# Patient Record
Sex: FEMALE | Race: WHITE | NOT HISPANIC OR LATINO | Employment: FULL TIME | URBAN - METROPOLITAN AREA
[De-identification: names, ages, dates, MRNs, and addresses within clinical notes are randomized per-mention and may not be internally consistent; named-entity substitution may affect disease eponyms.]

---

## 2017-01-01 LAB — PAP-ABSTRACT: NORMAL

## 2017-01-17 ENCOUNTER — TRANSFERRED RECORDS (OUTPATIENT)
Dept: HEALTH INFORMATION MANAGEMENT | Facility: CLINIC | Age: 27
End: 2017-01-17

## 2017-02-06 ENCOUNTER — HOSPITAL ENCOUNTER (INPATIENT)
Facility: CLINIC | Age: 27
LOS: 2 days | Discharge: HOME | DRG: 445 | End: 2017-02-08
Attending: EMERGENCY MEDICINE | Admitting: INTERNAL MEDICINE
Payer: COMMERCIAL

## 2017-02-06 DIAGNOSIS — K75.9 HEPATITIS: ICD-10-CM

## 2017-02-06 DIAGNOSIS — F41.9 ANXIETY: Primary | ICD-10-CM

## 2017-02-06 DIAGNOSIS — R94.5 ABNORMAL RESULTS OF LIVER FUNCTION STUDIES: ICD-10-CM

## 2017-02-06 DIAGNOSIS — R11.10 VOMITING: ICD-10-CM

## 2017-02-06 LAB
CRP SERPL-MCNC: 13.7 MG/L (ref 0–8)
GGT SERPL-CCNC: 598 U/L (ref 0–40)
HCG SERPL QL: NEGATIVE
HETEROPH AB SER QL: NEGATIVE
INR PPP: 0.91 (ref 0.86–1.14)

## 2017-02-06 PROCEDURE — 96375 TX/PRO/DX INJ NEW DRUG ADDON: CPT

## 2017-02-06 PROCEDURE — 84703 CHORIONIC GONADOTROPIN ASSAY: CPT | Performed by: EMERGENCY MEDICINE

## 2017-02-06 PROCEDURE — 36415 COLL VENOUS BLD VENIPUNCTURE: CPT | Performed by: INTERNAL MEDICINE

## 2017-02-06 PROCEDURE — 82977 ASSAY OF GGT: CPT | Performed by: EMERGENCY MEDICINE

## 2017-02-06 PROCEDURE — 99222 1ST HOSP IP/OBS MODERATE 55: CPT | Mod: AI | Performed by: INTERNAL MEDICINE

## 2017-02-06 PROCEDURE — 25000128 H RX IP 250 OP 636: Performed by: EMERGENCY MEDICINE

## 2017-02-06 PROCEDURE — 25000128 H RX IP 250 OP 636: Performed by: INTERNAL MEDICINE

## 2017-02-06 PROCEDURE — 12000011 ZZH R&B MS OVERFLOW

## 2017-02-06 PROCEDURE — 99285 EMERGENCY DEPT VISIT HI MDM: CPT | Mod: 25

## 2017-02-06 PROCEDURE — 12000013 ZZH R&B PEDS

## 2017-02-06 PROCEDURE — 85610 PROTHROMBIN TIME: CPT | Performed by: EMERGENCY MEDICINE

## 2017-02-06 PROCEDURE — 86308 HETEROPHILE ANTIBODY SCREEN: CPT | Performed by: INTERNAL MEDICINE

## 2017-02-06 PROCEDURE — 96374 THER/PROPH/DIAG INJ IV PUSH: CPT

## 2017-02-06 PROCEDURE — 86140 C-REACTIVE PROTEIN: CPT | Performed by: EMERGENCY MEDICINE

## 2017-02-06 PROCEDURE — 25000125 ZZHC RX 250: Performed by: INTERNAL MEDICINE

## 2017-02-06 RX ORDER — ONDANSETRON 2 MG/ML
4 INJECTION INTRAMUSCULAR; INTRAVENOUS EVERY 6 HOURS PRN
Status: DISCONTINUED | OUTPATIENT
Start: 2017-02-06 | End: 2017-02-08 | Stop reason: HOSPADM

## 2017-02-06 RX ORDER — ONDANSETRON 4 MG/1
4 TABLET, ORALLY DISINTEGRATING ORAL EVERY 6 HOURS PRN
Status: DISCONTINUED | OUTPATIENT
Start: 2017-02-06 | End: 2017-02-08 | Stop reason: HOSPADM

## 2017-02-06 RX ORDER — PROCHLORPERAZINE MALEATE 5 MG
5-10 TABLET ORAL EVERY 6 HOURS PRN
Status: DISCONTINUED | OUTPATIENT
Start: 2017-02-06 | End: 2017-02-08 | Stop reason: HOSPADM

## 2017-02-06 RX ORDER — SODIUM CHLORIDE 9 MG/ML
INJECTION, SOLUTION INTRAVENOUS CONTINUOUS
Status: DISCONTINUED | OUTPATIENT
Start: 2017-02-06 | End: 2017-02-08 | Stop reason: HOSPADM

## 2017-02-06 RX ORDER — DESOGESTREL AND ETHINYL ESTRADIOL 0.15-0.03
1 KIT ORAL DAILY
COMMUNITY
End: 2018-03-21

## 2017-02-06 RX ORDER — TEMAZEPAM 7.5 MG/1
15 CAPSULE ORAL
Status: DISCONTINUED | OUTPATIENT
Start: 2017-02-06 | End: 2017-02-08 | Stop reason: HOSPADM

## 2017-02-06 RX ORDER — KETOROLAC TROMETHAMINE 30 MG/ML
30 INJECTION, SOLUTION INTRAMUSCULAR; INTRAVENOUS ONCE
Status: COMPLETED | OUTPATIENT
Start: 2017-02-06 | End: 2017-02-06

## 2017-02-06 RX ORDER — DIPHENHYDRAMINE HYDROCHLORIDE 50 MG/ML
25 INJECTION INTRAMUSCULAR; INTRAVENOUS ONCE
Status: COMPLETED | OUTPATIENT
Start: 2017-02-06 | End: 2017-02-06

## 2017-02-06 RX ORDER — LEVOFLOXACIN 5 MG/ML
500 INJECTION, SOLUTION INTRAVENOUS EVERY 24 HOURS
Status: DISCONTINUED | OUTPATIENT
Start: 2017-02-06 | End: 2017-02-07

## 2017-02-06 RX ORDER — ONDANSETRON 2 MG/ML
4 INJECTION INTRAMUSCULAR; INTRAVENOUS EVERY 30 MIN PRN
Status: DISCONTINUED | OUTPATIENT
Start: 2017-02-06 | End: 2017-02-06

## 2017-02-06 RX ORDER — OXYCODONE HYDROCHLORIDE 5 MG/1
5-10 TABLET ORAL
Status: DISCONTINUED | OUTPATIENT
Start: 2017-02-06 | End: 2017-02-08 | Stop reason: HOSPADM

## 2017-02-06 RX ORDER — VENLAFAXINE HYDROCHLORIDE 75 MG/1
150 TABLET, EXTENDED RELEASE ORAL ONCE
Status: DISCONTINUED | OUTPATIENT
Start: 2017-02-06 | End: 2017-02-06

## 2017-02-06 RX ORDER — VENLAFAXINE HYDROCHLORIDE 150 MG/1
150 TABLET, EXTENDED RELEASE ORAL DAILY
Status: DISCONTINUED | OUTPATIENT
Start: 2017-02-07 | End: 2017-02-08 | Stop reason: HOSPADM

## 2017-02-06 RX ORDER — ONDANSETRON 4 MG/1
4 TABLET, ORALLY DISINTEGRATING ORAL ONCE
Status: DISCONTINUED | OUTPATIENT
Start: 2017-02-06 | End: 2017-02-06

## 2017-02-06 RX ORDER — PROCHLORPERAZINE 25 MG
25 SUPPOSITORY, RECTAL RECTAL EVERY 12 HOURS PRN
Status: DISCONTINUED | OUTPATIENT
Start: 2017-02-06 | End: 2017-02-08 | Stop reason: HOSPADM

## 2017-02-06 RX ORDER — HYDROMORPHONE HYDROCHLORIDE 1 MG/ML
.3-.5 INJECTION, SOLUTION INTRAMUSCULAR; INTRAVENOUS; SUBCUTANEOUS
Status: DISCONTINUED | OUTPATIENT
Start: 2017-02-06 | End: 2017-02-08 | Stop reason: HOSPADM

## 2017-02-06 RX ORDER — NALOXONE HYDROCHLORIDE 0.4 MG/ML
.1-.4 INJECTION, SOLUTION INTRAMUSCULAR; INTRAVENOUS; SUBCUTANEOUS
Status: DISCONTINUED | OUTPATIENT
Start: 2017-02-06 | End: 2017-02-08 | Stop reason: HOSPADM

## 2017-02-06 RX ORDER — LORAZEPAM 2 MG/ML
0.5 INJECTION INTRAMUSCULAR EVERY 4 HOURS PRN
Status: DISCONTINUED | OUTPATIENT
Start: 2017-02-06 | End: 2017-02-08 | Stop reason: HOSPADM

## 2017-02-06 RX ORDER — VENLAFAXINE HYDROCHLORIDE 150 MG/1
150 CAPSULE, EXTENDED RELEASE ORAL DAILY
Status: ON HOLD | COMMUNITY
End: 2017-02-08

## 2017-02-06 RX ORDER — DESOGESTREL AND ETHINYL ESTRADIOL 0.15-0.03
1 KIT ORAL DAILY
Status: DISCONTINUED | OUTPATIENT
Start: 2017-02-07 | End: 2017-02-08 | Stop reason: HOSPADM

## 2017-02-06 RX ADMIN — DIPHENHYDRAMINE HYDROCHLORIDE 25 MG: 50 INJECTION INTRAMUSCULAR; INTRAVENOUS at 19:48

## 2017-02-06 RX ADMIN — KETOROLAC TROMETHAMINE 30 MG: 30 INJECTION, SOLUTION INTRAMUSCULAR at 18:20

## 2017-02-06 RX ADMIN — METRONIDAZOLE 500 MG: 500 INJECTION, SOLUTION INTRAVENOUS at 22:17

## 2017-02-06 RX ADMIN — LEVOFLOXACIN 500 MG: 5 INJECTION, SOLUTION INTRAVENOUS at 21:14

## 2017-02-06 RX ADMIN — ONDANSETRON 4 MG: 2 INJECTION INTRAMUSCULAR; INTRAVENOUS at 18:20

## 2017-02-06 RX ADMIN — SODIUM CHLORIDE: 9 INJECTION, SOLUTION INTRAVENOUS at 21:08

## 2017-02-06 ASSESSMENT — ACTIVITIES OF DAILY LIVING (ADL)
SWALLOWING: 0-->SWALLOWS FOODS/LIQUIDS WITHOUT DIFFICULTY
TRANSFERRING: 0-->INDEPENDENT
DRESS: 0-->INDEPENDENT
RETIRED_EATING: 0-->INDEPENDENT
BATHING: 0-->INDEPENDENT
TOILETING: 0-->INDEPENDENT
COGNITION: 0 - NO COGNITION ISSUES REPORTED
FALL_HISTORY_WITHIN_LAST_SIX_MONTHS: NO
AMBULATION: 0-->INDEPENDENT
RETIRED_COMMUNICATION: 0-->UNDERSTANDS/COMMUNICATES WITHOUT DIFFICULTY

## 2017-02-06 ASSESSMENT — ENCOUNTER SYMPTOMS
VOMITING: 1
NAUSEA: 1
ABDOMINAL PAIN: 1

## 2017-02-06 NOTE — IP AVS SNAPSHOT
"                  MRN:[id number]                      After Visit Summary   2/6/2017    Alba Lee    MRN: [id number]           Thank you!     Thank you for choosing [facility] for your care. Our goal is always to provide you with excellent care. Hearing back from our patients is one way we can continue to improve our services. Please take a few minutes to complete the written survey that you may receive in the mail after you visit. If you would like to speak to someone directly about your visit please contact Patient Relations at [phone]. Thank you!          Patient Information     Date Of Birth          1990        About your hospital stay     You were admitted on:  February 6, 2017 You last received care in the:  [facility]    You were discharged on:  February 8, 2017        Reason for your hospital stay       Pain in abdomen  Abnormal Liver function Tests                  Who to Call     For medical emergencies, please call 911.  For non-urgent questions about your medical care, please call your primary care provider or clinic, [phone]          Attending Provider     Provider    Anderson Duque MD Ricklefs, Kendall D, DO       Primary Care Provider Office Phone # Fax #    Jozef Zelaya PA-C [phone]       [facility] [address] [place] [address]        After Care Instructions     Diet       Follow this diet upon discharge: Low fat diet                  Follow-up Appointments     Follow-up and recommended labs and tests        Follow up with primary care provider, Jozef Zelaya, within 7 days for hospital follow- up.  The following labs/tests are recommended: Liver function tests. If your symptoms persist, then talk with your primary doctor about doing a \"HIDA\" scan for the gallbladder.     Discuss with your primary doctor about reducing the dose for Effexor     Follow up with GI specialist in 1-2 weeks.      No Alcohol " "use, avoid use of tylenol (acetaminophen)                  Additional Services     GASTROENTEROLOGY ADULT REF CONSULT ONLY       Preferred Location: [facility] [phone]. Seen by Dr. Yeboah in hospital       Please be aware that coverage of these services is subject to the terms and limitations of your health insurance plan.  Call member services at your health plan with any benefit or coverage questions.  Any procedures must be performed at a [facility] facility OR coordinated by your clinic's referral office.    Please bring the following with you to your appointment:    (1) Any X-Rays, CTs or MRIs which have been performed.  Contact the facility where they were done to arrange for  prior to your scheduled appointment.    (2) List of current medications   (3) This referral request   (4) Any documents/labs given to you for this referral                  Pending Results     Date and Time Order Name Status Description    2/8/2017 0525 Hepatitis C antibody In process     2/7/2017 1910 Hepatitis C antibody In process     2/7/2017 1627 F Actin EAI with reflex In process     2/7/2017 1612 Antinuclear antibody screen by EIA In process             Statement of Approval     Ordered          02/08/17 1206  I have reviewed and agree with all the recommendations and orders detailed in this document.   EFFECTIVE NOW     Approved and electronically signed by:  Cristiana Villar MD             Admission Information        Provider Department Dept Phone    2/6/2017 Zeyad Schmitt,   Pediatrics [phone]      Your Vitals Were     Blood Pressure Pulse Temperature Respirations    130/80 mmHg 93 98  F (36.7  C) (Oral) 16    Height Weight BMI (Body Mass Index) Pulse Oximetry    1.575 m (5' 2\") 82.6 kg (182 lb 1.6 oz) 33.30 kg/m2 98%    Last Period             01/30/2017         MyChart Information     [employer] lets you send messages to your doctor, view your test results, renew your prescriptions, schedule " "appointments and more. To sign up, go to [email or web address] . Click on \"Log in\" on the left side of the screen, which will take you to the Welcome page. Then click on \"Sign up Now\" on the right side of the page.     You will be asked to enter the access code listed below, as well as some personal information. Please follow the directions to create your username and password.     Your access code is: [email or web address]  Expires: 2017 11:46 AM     Your access code will  in 90 days. If you need help or a new code, please call your [place] clinic or [phone].        Care EveryWhere ID     This is your Care EveryWhere ID. This could be used by other organizations to access your [place] medical records  [id number]           Review of your medicines      CONTINUE these medicines which may have CHANGED, or have new prescriptions. If we are uncertain of the size of tablets/capsules you have at home, strength may be listed as something that might have changed.        Dose / Directions    venlafaxine 75 MG 24 hr capsule   Commonly known as:  EFFEXOR-XR   This may have changed:    - medication strength  - how much to take   Used for:  Anxiety        Dose:  75 mg   Take 1 capsule (75 mg) by mouth daily   Quantity:  10 capsule   Refills:  0         CONTINUE these medicines which have NOT CHANGED        Dose / Directions    ondansetron 4 MG tablet   Commonly known as:  ZOFRAN        Take by mouth every 6 hours as needed for nausea   Refills:  0       RECLIPSEN 0.15-30 MG-MCG per tablet   Generic drug:  desogestrel-ethinyl estradiol        Dose:  1 tablet   Take 1 tablet by mouth daily   Refills:  0            Where to get your medicines      These medications were sent to [id number] - [place] - [address]  AT [address] [place] [id number] [place] [address]     Phone:  [phone]    - venlafaxine 75 MG 24 hr capsule             Protect others around you: Learn how " to safely use, store and throw away your medicines at www.disposemymeds.org.             Medication List: This is a list of all your medications and when to take them. Check marks below indicate your daily home schedule. Keep this list as a reference.      Medications           Morning Afternoon Evening Bedtime As Needed    ondansetron 4 MG tablet   Commonly known as:  ZOFRAN   Take by mouth every 6 hours as needed for nausea                                RECLIPSEN 0.15-30 MG-MCG per tablet   Take 1 tablet by mouth daily   Last time this was given:  1 tablet on 2/8/2017  7:56 AM   Generic drug:  desogestrel-ethinyl estradiol                                venlafaxine 75 MG 24 hr capsule   Commonly known as:  EFFEXOR-XR   Take 1 capsule (75 mg) by mouth daily

## 2017-02-06 NOTE — ED NOTES
Pt has abdominal pain with nausea and vomiting for past 5 days.  Sxs have worsened in past 3 days.  She is referred from clinic after abnormal liver tests were found and results are worse today than yesterday.  CT scan was done today.  Ultrasound done yesterday.

## 2017-02-06 NOTE — ED PROVIDER NOTES
History     Chief Complaint:  Abdominal Pain and Nausea & Vomiting    HPI   Alba Lee is a 26 year old female who presents with abdominal pain. The patient has had abdominal pain, nausea and vomiting for the past 5 days, worsening over the past 3 days. Yesterday she went to [facility] urgent care, and had blood drawn and urinalysis. She was then sent to [facility] for ultrasound imaging. Today, the patient went back to have blood drawn at urgent care, and her LFT's were increased. She also underwent CT imaging. In addition, the patient has had dark urine, despite drinking plenty of fluids. They told her to present to the ED. The patient has no history of liver problems. No prior blood transfusions. She did get a tattoo several years ago.    Prior Imaging  2017  Abdomen US (RUQ organs), per radiology:   Unremarkable right upper quadrant ultrasound.    2017  CT Abdomen and Pelvis, w IV contrast, per radiology:   1. Mildly dilated gallbladder with slightly prominent wall. No calculi were seen on the previous ultrasound which should make cholecystitis less likely.   2. No other acute pathology.     Prior lab testin2017  CMP: ALKPHOS 231 (H),  (H), AST 96 (H), Bilirubin, direct 3.6 (H), Bilirubin total 4.4 (H), o/w WNL (Creatinine 0.70)    2017  CMP: ALKPHOS 260 (H),  (H),  (H), Bilirubin direct 3.8 (H), Bilirubin total 4.6 (H), o/w WNL     Allergies:  Penicillins    Medications:    Venlafaxine HCl  Estrostep  Celexa    Past Medical History:    Depression  Suicide attempt    Past Surgical History:    History reviewed.  No significant past surgical history.    Family History:    History reviewed.  No significant family history.    Social History:  Relationship status: Single  Tobacco use: Positive  Alcohol use: Positive  The patient presents with her mother.     Review of Systems   Gastrointestinal: Positive for nausea, vomiting and abdominal pain.   All other  systems reviewed and are negative.      Physical Exam   First Vitals:  BP: (!) 131/93 mmHg  Heart Rate: 112  Temp: 98.2  F (36.8  C)  Resp: 20  SpO2: 97 %      Physical Exam   Constitutional: She is oriented to person, place, and time. She appears well-developed.   HENT:   Head: Normocephalic and atraumatic.   Right Ear: External ear normal.   Mouth/Throat: Oropharynx is clear and moist.   Eyes: Conjunctivae and EOM are normal. Pupils are equal, round, and reactive to light.   Neck: Normal range of motion. Neck supple. No JVD present.   Cardiovascular: Normal rate, regular rhythm and normal heart sounds.    Pulmonary/Chest: Effort normal and breath sounds normal.   Abdominal: Soft. Bowel sounds are normal. She exhibits no distension. There is tenderness in the right upper quadrant. There is no rigidity, no rebound and no CVA tenderness. No hernia.   Musculoskeletal: Normal range of motion.   Lymphadenopathy:     She has no cervical adenopathy.   Neurological: She is alert and oriented to person, place, and time. She displays normal reflexes. No cranial nerve deficit. She exhibits normal muscle tone. Coordination normal.   Skin: Skin is warm and dry.   Psychiatric: She has a normal mood and affect. Her behavior is normal. Judgment normal.   Nursing note and vitals reviewed.        Emergency Department Course     Laboratory:  1640: INR: 0.91  CRP inflammation: 13.7 (H)  HCG Qualitative blood: Negative  GGT: 598 (H)    Interventions:  1820: Toradol, 30 mg, IV injection  1948: Benadryl, 25 mg, IV injection    Emergency Department Course:  Nursing notes and vitals reviewed.  I performed an exam of the patient as documented above.  The above workup was undertaken.  1908: I discussed the patient with Dr. Schmitt of the hospitalist service.  1933: I rechecked the patient and discussed results.    Findings and plan explained to the Patient who consents to admission. Discussed the patient with Dr. Schmitt, who will admit  the patient to a Med bed for further monitoring, evaluation, and treatment.      Impression & Plan      Medical Decision Making:  Alba Lee is a 26 year old female with RUQ abdominal pain and nausea. Patient has had a an extensive outpatient evaluation prior to seeing me, which includes CT and ultrasound. There is borderline enlargement of the gallbladder without clear stones, and without CBD debilitation. Patient has transaminates, as well as elevation in her total bilirubin. Differential diagnosis includes primary bilirubin disease vs obstruction. Due to pain and vomiting, I would recommend admission for further workup. Consider hida scan or MRCP. Care was discussed with Dr. Schmitt. Will admit to hospitalist. Managing nausea and continuing workup for acute elevations of liver function tests, and ruling out bile duct obstruction.    Plan: Admit to Dr. Schmitt    Diagnosis:  1. Vomiting  2. RUQ pain  3. Elevated liver function tests.     Diagnosis:    ICD-10-CM    1. Vomiting R11.10 Mononucleosis screen     Disposition:  Admit to a Med bed under the care of Dr. Schmitt.    I, John Cobian, am serving as a scribe on 2/6/2017 at 6:00 PM to personally document services performed by Anderson Duque MD, based on my observations and the provider's statements to me.    [facility] EMERGENCY DEPARTMENT        Anderson Duque MD  02/11/17 210

## 2017-02-06 NOTE — IP AVS SNAPSHOT
[facility]    [address]    [facility] [address]    Phone:  [phone]    Fax:  [phone]                                       After Visit Summary   2/6/2017    Alba Lee    MRN: [id number]           After Visit Summary Signature Page     I have received my discharge instructions, and my questions have been answered. I have discussed any challenges I see with this plan with the nurse or doctor.    ..........................................................................................................................................  Patient/Patient Representative Signature      ..........................................................................................................................................  Patient Representative Print Name and Relationship to Patient    ..................................................               ................................................  Date                                            Time    ..........................................................................................................................................  Reviewed by Signature/Title    ...................................................              ..............................................  Date                                                            Time

## 2017-02-07 ENCOUNTER — APPOINTMENT (OUTPATIENT)
Dept: MRI IMAGING | Facility: CLINIC | Age: 27
DRG: 445 | End: 2017-02-07
Attending: PHYSICIAN ASSISTANT
Payer: COMMERCIAL

## 2017-02-07 LAB
ALBUMIN SERPL-MCNC: 3.2 G/DL (ref 3.4–5)
ALP SERPL-CCNC: 221 U/L (ref 40–150)
ALT SERPL W P-5'-P-CCNC: 272 U/L (ref 0–50)
ANION GAP SERPL CALCULATED.3IONS-SCNC: 10 MMOL/L (ref 3–14)
AST SERPL W P-5'-P-CCNC: 153 U/L (ref 0–45)
BILIRUB SERPL-MCNC: 3.4 MG/DL (ref 0.2–1.3)
BUN SERPL-MCNC: 7 MG/DL (ref 7–30)
CALCIUM SERPL-MCNC: 8.2 MG/DL (ref 8.5–10.1)
CHLORIDE SERPL-SCNC: 108 MMOL/L (ref 94–109)
CO2 SERPL-SCNC: 24 MMOL/L (ref 20–32)
CREAT SERPL-MCNC: 0.66 MG/DL (ref 0.52–1.04)
ERYTHROCYTE [DISTWIDTH] IN BLOOD BY AUTOMATED COUNT: 13.4 % (ref 10–15)
GFR SERPL CREATININE-BSD FRML MDRD: ABNORMAL ML/MIN/1.7M2
GLUCOSE SERPL-MCNC: 89 MG/DL (ref 70–99)
HCT VFR BLD AUTO: 35.3 % (ref 35–47)
HGB BLD-MCNC: 11.4 G/DL (ref 11.7–15.7)
MCH RBC QN AUTO: 28.6 PG (ref 26.5–33)
MCHC RBC AUTO-ENTMCNC: 32.3 G/DL (ref 31.5–36.5)
MCV RBC AUTO: 89 FL (ref 78–100)
PLATELET # BLD AUTO: 212 10E9/L (ref 150–450)
POTASSIUM SERPL-SCNC: 3.8 MMOL/L (ref 3.4–5.3)
PROT SERPL-MCNC: 6.6 G/DL (ref 6.8–8.8)
RBC # BLD AUTO: 3.99 10E12/L (ref 3.8–5.2)
SODIUM SERPL-SCNC: 142 MMOL/L (ref 133–144)
WBC # BLD AUTO: 4 10E9/L (ref 4–11)

## 2017-02-07 PROCEDURE — 25000132 ZZH RX MED GY IP 250 OP 250 PS 637: Performed by: INTERNAL MEDICINE

## 2017-02-07 PROCEDURE — A9585 GADOBUTROL INJECTION: HCPCS | Performed by: INTERNAL MEDICINE

## 2017-02-07 PROCEDURE — 12000011 ZZH R&B MS OVERFLOW

## 2017-02-07 PROCEDURE — 36415 COLL VENOUS BLD VENIPUNCTURE: CPT | Performed by: INTERNAL MEDICINE

## 2017-02-07 PROCEDURE — 83516 IMMUNOASSAY NONANTIBODY: CPT | Performed by: INTERNAL MEDICINE

## 2017-02-07 PROCEDURE — 99232 SBSQ HOSP IP/OBS MODERATE 35: CPT | Performed by: INTERNAL MEDICINE

## 2017-02-07 PROCEDURE — 86709 HEPATITIS A IGM ANTIBODY: CPT | Performed by: INTERNAL MEDICINE

## 2017-02-07 PROCEDURE — 25000125 ZZHC RX 250: Performed by: INTERNAL MEDICINE

## 2017-02-07 PROCEDURE — 87340 HEPATITIS B SURFACE AG IA: CPT | Performed by: INTERNAL MEDICINE

## 2017-02-07 PROCEDURE — 25000128 H RX IP 250 OP 636: Performed by: INTERNAL MEDICINE

## 2017-02-07 PROCEDURE — 80053 COMPREHEN METABOLIC PANEL: CPT | Performed by: INTERNAL MEDICINE

## 2017-02-07 PROCEDURE — 74183 MRI ABD W/O CNTR FLWD CNTR: CPT

## 2017-02-07 PROCEDURE — 86803 HEPATITIS C AB TEST: CPT | Performed by: INTERNAL MEDICINE

## 2017-02-07 PROCEDURE — 85027 COMPLETE CBC AUTOMATED: CPT | Performed by: INTERNAL MEDICINE

## 2017-02-07 PROCEDURE — 86038 ANTINUCLEAR ANTIBODIES: CPT | Performed by: INTERNAL MEDICINE

## 2017-02-07 PROCEDURE — 25500064 ZZH RX 255 OP 636: Performed by: INTERNAL MEDICINE

## 2017-02-07 PROCEDURE — 99221 1ST HOSP IP/OBS SF/LOW 40: CPT | Performed by: SURGERY

## 2017-02-07 RX ORDER — IBUPROFEN 200 MG
200 TABLET ORAL EVERY 6 HOURS PRN
Status: DISCONTINUED | OUTPATIENT
Start: 2017-02-07 | End: 2017-02-08 | Stop reason: HOSPADM

## 2017-02-07 RX ORDER — GADOBUTROL 604.72 MG/ML
15 INJECTION INTRAVENOUS ONCE
Status: COMPLETED | OUTPATIENT
Start: 2017-02-07 | End: 2017-02-07

## 2017-02-07 RX ORDER — DIPHENHYDRAMINE HCL 25 MG
25 CAPSULE ORAL EVERY 6 HOURS PRN
Status: DISCONTINUED | OUTPATIENT
Start: 2017-02-07 | End: 2017-02-08 | Stop reason: HOSPADM

## 2017-02-07 RX ORDER — DIPHENHYDRAMINE HYDROCHLORIDE 50 MG/ML
25 INJECTION INTRAMUSCULAR; INTRAVENOUS EVERY 6 HOURS PRN
Status: DISCONTINUED | OUTPATIENT
Start: 2017-02-07 | End: 2017-02-08 | Stop reason: HOSPADM

## 2017-02-07 RX ADMIN — DIPHENHYDRAMINE HYDROCHLORIDE 25 MG: 50 INJECTION, SOLUTION INTRAMUSCULAR; INTRAVENOUS at 10:51

## 2017-02-07 RX ADMIN — ONDANSETRON 4 MG: 2 INJECTION INTRAMUSCULAR; INTRAVENOUS at 06:08

## 2017-02-07 RX ADMIN — METRONIDAZOLE 500 MG: 500 INJECTION, SOLUTION INTRAVENOUS at 15:15

## 2017-02-07 RX ADMIN — GADOBUTROL 15 ML: 604.72 INJECTION INTRAVENOUS at 12:19

## 2017-02-07 RX ADMIN — TEMAZEPAM 15 MG: 7.5 CAPSULE ORAL at 22:16

## 2017-02-07 RX ADMIN — IBUPROFEN 200 MG: 200 TABLET, FILM COATED ORAL at 19:58

## 2017-02-07 RX ADMIN — SODIUM CHLORIDE: 9 INJECTION, SOLUTION INTRAVENOUS at 09:25

## 2017-02-07 RX ADMIN — DESOGESTREL AND ETHINYL ESTRADIOL 1 TABLET: KIT at 21:11

## 2017-02-07 RX ADMIN — METRONIDAZOLE 500 MG: 500 INJECTION, SOLUTION INTRAVENOUS at 06:08

## 2017-02-07 NOTE — PHARMACY-ADMISSION MEDICATION HISTORY
Admission medication history interview status for this patient is complete. See [facility] admission navigator for allergy information, prior to admission medications and immunization status.     Medication history interview source(s):Patient  Medication history resources (including written lists, pill bottles, clinic record):None    Changes made to PTA medication list:  Added: effexor  Deleted: celexa  Changed: from estrostep to Reclipsen    Actions taken by pharmacist (provider contacted, etc):None     Additional medication history information:None    Medication reconciliation/reorder completed by provider prior to medication history? No    For patients on insulin therapy: no (Yes/No)   Lantus/levemir/NPH/Mix 70/30 dose: _____ in AM/PM or twice daily   Sliding scale Novolog Y/N   If Yes, do you have a baseline novolog pre-meal dose: ______units with meals   Patients eat three meals a day: Y/N   Any Barriers to therapy: cost of medications/comfortable with giving injections (if applicable)/ comfortable and confident with current diabetes regimen       Prior to Admission medications    Medication Sig Last Dose Taking? Auth Provider   desogestrel-ethinyl estradiol (RECLIPSEN) 0.15-30 MG-MCG per tablet Take 1 tablet by mouth daily 2/5/2017 at Unknown time Yes Unknown, Entered By History   venlafaxine (EFFEXOR-XR) 150 MG 24 hr capsule Take 150 mg by mouth daily Past Week at Unknown time Yes Unknown, Entered By History

## 2017-02-07 NOTE — CONSULTS
General Surgery Consultation    Alba Lee MRN# [id number]   Age: 26 year old YOB: 1990     Date of Admission:  2/6/2017    Reason for consult:            Possible CBD stone       Requesting physician:            Keshia                Assessment and Plan:   Assessment:   Elevated LFTs, source unclear, passed CBD stone a possibility, as is infectious, toxic cause  Pancreatic divisum  Mild splenic enlargement - possibly due to recent viral illness      Plan:   While passage of gallstone a possibility, would expect significant pain to precede bilirubinuria and no stones or GB/duct abnormality seen on MRCP  Discussed option of cholecystectomy and ordered teaching literature but would be hesitant to recommend surgery without definitive evidence for GB disease  Since Sx are suggestive of GB disease but imaging does not support this diagnosis, will check a CCK stimulated HIDA scan. Discussed with radiology - cannot be done until tomorrow.                   Chief Complaint:   Nausea, vomiting, dark urine     History is obtained from the patient and electronic health record         History of Present Illness:   This patient is a 26 year old  female without a significant past medical history who presents with   .5 days of dark urine, 3 days of intermittent vomiting, especially after eating. Also noted some RUQ pains . Has felt chills but has had no fever. Reports having had a nutritional supplement prior to symptoms. Seen with mom.           Past Medical History:    has a past medical history of Depressive disorder and Suicide attempt (H) ([id number]).          Past Surgical History:   History reviewed. No pertinent past surgical history.          Social History:     Social History   Substance Use Topics     Smoking status: Current Some Day Smoker     Smokeless tobacco: Never Used     Alcohol Use: Yes      Comment: social (binge drinks), blackouts; no sz or DTs when stops             Family  "History:   This patient has no significant family history          Allergies:     Allergies   Allergen Reactions     Penicillins Hives             Medications:     No current facility-administered medications on file prior to encounter.  No current outpatient prescriptions on file prior to encounter.    levofloxacin  500 mg Intravenous Q24H     metroNIDAZOLE  500 mg Intravenous Q8H     desogestrel-ethinyl estradiol  1 tablet Oral Daily     venlafaxine  150 mg Oral Daily            Review of Systems:   The Review of Systems is negative other than noted in the HPI  She does report a viral illness for a few weeks, several weeks ago but felt significantly ill for only a few days          Physical Exam:   Gen:  This is a well developed, well nourished female in no apparent distress.  Blood pressure 123/68, pulse 80, temperature 98  F (36.7  C), temperature source Oral, resp. rate 16, height 1.575 m (5' 2\"), weight 82.6 kg (182 lb 1.6 oz), last menstrual period 01/30/2017, SpO2 99 %.  HEENT - Normocephalic, atraumatic, mucous membranes nl.  min scleral icterus.  Neck - supple without masses  Lungs - clear to ascultation.    Heart - Regular rate & rhythm without murmur  Abdomen:   soft, non-distended, non-tender and no masses palpated   Extremities - warm without edema  Neurologic - nonfocal          Data:   WBC -   WBC   Date Value Ref Range Status   02/07/2017 4.0 4.0 - 11.0 10e9/L Final   ], HgB - HEMOGLOBIN   Date Value Ref Range Status   02/07/2017 11.4* 11.7 - 15.7 g/dL Final   ]   Liver Function Studies -   Recent Labs   Lab Test  02/07/17   0710   PROTTOTAL  6.6*   ALBUMIN  3.2*   BILITOTAL  3.4*   ALKPHOS  221*   AST  153*   ALT  272*       CT scan of the abdomen:   No masses, free air, abcesses or significant abnormalities  By report form outside, no images to review  CT scan interpreted by radiologist     Gallbladder ultrasound:   Normal gallbladder without inflammation, enlargement or stones  Common bile duct " mildly dilated (no measurement)  Outside report, no images to review     MRI of the abdomen:   Gallbladder: normal, no stones, nl intra and extra hepatic bile duct, no choledocholithiasis,   Pancreas: divisum noted, main pancreatic duct empties via minor papilla   Spleen: enlarged at 16 cm        Yogi Nance MD

## 2017-02-07 NOTE — PROGRESS NOTES
"[facility]  Hospitalist Progress Note  Patient Name: Alba Lee    MRN: [id number]  Provider: Jace Berumen MD  02/07/2017    Initial presenting complaint/issue to hospital (Diagnosis): nausea and vomiting         Assessment and Plan:      Summary of Stay: Alba Lee is a 26 year old female with history of depression and anxiety.  She presented to the ED on 2/6/2017 with nausea and vomiting.  She had been seen in urgent care on 2/5 and 2/6 and found to have elevated LFT's (BilT 3.8, alk phos 260, , and ). Ab US showed no gall stones but did show thickened wall. She came to the ED with worsening symptoms.  ED work up showed Jose T of 3.4, alk phos of 221, ALT of 221 and AST of 272).  She was admitted for further eval.  She improved clinically.  She was seen by GI and passed CBD stone was suspected.  MRCP was ordered and Surgery consult for consideration of lap cesar at some point was recommended. MRCP showed no CBD stone or other abnormality.     Problem List:   1.  Elevated LFT's, possibly due to passed CBD stone.  MRCP showed no CBD stone.  Clear liquid diet- NPO after midnight. Surgery consult regarding further eval- HIDA? Recommend cholecystectomy?    2.  Depression and anxiety.  Continue Effexor.  DVT Prophylaxis:  Ambulation  Code Status: Full Code  Discharge Dispo: Home  Estimated Disch Date / # of Days until Discharge:  1-2 days.        Interval History:      Patient is feeling better with no pain, nausea, or vomiting.                   Physical Exam:      Last Vital Signs:  /68 mmHg  Pulse 80  Temp(Src) 98  F (36.7  C) (Oral)  Resp 16  Ht 1.575 m (5' 2\")  Wt 82.6 kg (182 lb 1.6 oz)  BMI 33.30 kg/m2  SpO2 99%  LMP 01/30/2017    Intake/Output Summary (Last 24 hours) at 02/07/17 1603  Last data filed at 02/07/17 0600   Gross per 24 hour   Intake   1436 ml   Output      0 ml   Net   1436 ml       GENERAL:  Comfortable appearing. Cooperative.  PSYCH: " pleasant, oriented, No acute distress.  EYES: PERRLA, Normal conjunctiva.  HEART:  Regular rate and rhythm. No JVD. Pulses normal. No edema.  LUNGS:  Clear to auscultation, normal Respiratory effort.  ABDOMEN:  Soft, no hepatosplenomegaly, normal bowel sounds.  EXTREMETIES: No clubbing, cyanosis or ischemia  SKIN:  Dry to touch, No rash.           Medications:      All current medications were reviewed.         Data:      All new lab and imaging data was reviewed.   Labs:  No results for input(s): CULT in the last 168 hours.       NA      142   2/7/2017  NA      139   5/23/2007  NA      141   9/6/2006 CHLORIDE      108   2/7/2017  CHLORIDE      104   5/23/2007  CHLORIDE      104   9/6/2006 BUN        7   2/7/2017  BUN       11   5/23/2007  BUN       12   9/6/2006   POTASSIUM      3.8   2/7/2017  POTASSIUM      3.4   5/23/2007  POTASSIUM      3.5   9/6/2006 CO2       24   2/7/2017  CO2       19   5/23/2007  CO2       21   9/6/2006 CR     0.66   2/7/2017  CR     0.79   5/23/2007  CR     0.80   9/6/2006       Recent Labs  Lab 02/07/17  0710   WBC 4.0   HGB 11.4*   HCT 35.3   MCV 89          Recent Labs  Lab 02/07/17  0710 02/06/17  1640   *  --    *  --    GGT  --  598*   ALKPHOS 221*  --    BILITOTAL 3.4*  --       Imaging:   Recent Results (from the past 24 hour(s))   MR Abdomen MRCP w/o & w Contrast    Narrative    MR ABDOMEN MRCP WITHOUT AND WITH CONTRAST  2/7/2017  12:37 PM     HISTORY: Dilated common bile duct.      TECHNIQUE:  Multisequence, multiplanar imaging is performed through  the biliary system. A total of 15 mL Gadavist is injected  intravenously followed by dynamic axial T1 fat sat sequences.    FINDINGS:     Abdomen: The liver, pancreas, adrenal glands and kidneys are  unremarkable. No hydronephrosis. No evidence of fatty liver  infiltration or mass. No enlarged lymph nodes. Imaged portions of  bowel are nondistended. Spleen is enlarged measuring nearly 16 cm in  craniocaudal  dimension on series 5, image 16. No focal splenic mass.    MRCP sequences show no evidence of intra or extrahepatic bile duct  dilatation. The gallbladder is unremarkable. No evidence of gallstones  or sludge. No biliary stones are evident. Pancreatic duct is normal in  caliber but empties in the minor papilla consistent with pancreas  divisum anomaly. This is best seen on series 6, image 21.    Following contrast administration, the upper abdominal organs enhance  normally. No evidence of ascites.      Impression    IMPRESSION:  1. Splenomegaly without focal mass. No enlarged abdominal lymph nodes.  2. Liver is unremarkable. No evidence of fatty infiltration or mass.  3. No evidence of gallstones or bile duct dilatation. Pancreas divisum  anomaly is noted. No peripancreatic inflammation to suggest  pancreatitis.    JONATHAN FARMER MD

## 2017-02-07 NOTE — CONSULTS
Contacted [employer] d/t pt not having insurance listed.  will see pt today.     Tanisha ELLIS RN, CTS  [phone]

## 2017-02-07 NOTE — CONSULTS
GASTROENTEROLOGY CONSULTATION      Alba Lee  [address]   [place] [address]  26 year old female     Admission Date/Time: 2/6/2017  Primary Care Provider: Jozef Zelaya  Referring / Attending Physician:  Dr. Schmitt     We were asked to see the patient in consultation by Dr. Schmitt for evaluation of elevated liver tests.        HPI:  Alba Lee is a 26 year old female without significant medical history who presents to the emergency room with abdominal pain, nausea, and vomiting.    Patient reports her abdominal pain came on suddenly in the right upper quadrant.  She states it was present upon waking about one week ago.  Initially she thought it was an upset stomach because of a change in her venlafaxine dosage.  However her pain persisted.  She was throwing up intermittently with little appetite.  She noticed her urine was darker than usual.  She went to [facility] urgent care.  Her LFTs were found to be elevated.  Abdominal ultrasound was completed and found to be unremarkable.  A CT scan of her abdomen and pelvis done with IV contrast showed a mildly dilated gallbladder.  Yesterday her ALT was 260, , total bilirubin 4.6.    Currently patient denies any significant abdominal pain.  She has no nausea or vomiting.  No fever or chills.  She denies any previous gallbladder problems.  There is no family history of gallbladder or liver disease.     PAST MEDICAL HISTORY:  Patient Active Problem List    Diagnosis Date Noted     Hepatitis 02/06/2017     Priority: Medium          ROS: A comprehensive ten point review of systems was negative aside from those in mentioned in the HPI.       MEDICATIONS:   Prior to Admission medications    Medication Sig Start Date End Date Taking? Authorizing Provider   desogestrel-ethinyl estradiol (RECLIPSEN) 0.15-30 MG-MCG per tablet Take 1 tablet by mouth daily   Yes Unknown, Entered By History   venlafaxine (EFFEXOR-XR) 150 MG 24 hr  "capsule Take 150 mg by mouth daily   Yes Unknown, Entered By History        ALLERGIES:   Allergies   Allergen Reactions     Penicillins Hives        SOCIAL HISTORY:  Social History   Substance Use Topics     Smoking status: Current Some Day Smoker     Smokeless tobacco: Never Used     Alcohol Use: Yes      Comment: social (binge drinks), blackouts; no sz or DTs when stops        FAMILY HISTORY: No history of gallbladder or liver disease.       PHYSICAL EXAM:   General: Comfortable in no distress  /68 mmHg  Pulse 80  Temp(Src) 98  F (36.7  C) (Oral)  Resp 16  Ht 1.575 m (5' 2\")  Wt 82.6 kg (182 lb 1.6 oz)  BMI 33.30 kg/m2  SpO2 99%  LMP 01/30/2017     PHYSICAL EXAM:  SKIN: no suspicious lesions, rashes  HEAD: Normocephalic. Atraumatic.  NECK: Neck supple. No adenopathy.   EYES: No scleral icterus  RESPIRATORY: Good transmission. CTA bilaterally.   CARDIOVASCULAR: RRR, normal S1, S2,  No murmur appreciated  GASTROINTESTINAL: +BS, soft, non tender, non distended, no guarding/rebound, no hepatosplenomegaly  JOINT/EXTREMITIES:  no gross deformities noted, normal muscle tone  NEURO: CN 2-12 grossly intact, no focal deficits  PSYCH: Normal affect          ADDITIONAL COMMENTS:   I reviewed the patient's new clinical lab test results.   Recent Labs   Lab Test  02/07/17   0710  02/06/17   1640   WBC  4.0   --    HGB  11.4*   --    MCV  89   --    PLT  212   --    INR   --   0.91     Recent Labs   Lab Test  02/07/17   0710   POTASSIUM  3.8   CHLORIDE  108   CO2  24   BUN  7   ANIONGAP  10     Recent Labs   Lab Test  02/07/17   0710   ALBUMIN  3.2*   BILITOTAL  3.4*   ALT  272*   AST  153*        IMAGING / ENDOSCOPY     Awaiting reports from care everywhere....     CONSULTATION ASSESSMENT AND PLAN:    Alba Lee is a 26 year old female without significant medical history who presents to the emergency room with abdominal pain, nausea, and vomiting found to have elevated LFTs without evidence of biliary " obstruction on imaging, although gallbladder wall thickening was seen.    1.  Elevated LFTs: Transaminases have improved overnight.  Bilirubin has dropped from 4.6-3.4.  She is no longer having any abdominal pain and her exam is benign.  She is afebrile without leukocytosis.  Suspect she may have passed a gallstone.  Per radiology report of ultrasound and CT no obvious biliary obstruction.     - Plan for MRCP, surgical consult, supportive care with IV fluids and pain control. NPO.       I discussed the patient plan with Dr. Yeboah. Thank you for asking us to participate in the care of this patient.    Sudha Carrasco PA-C  [facility]    MRCP is normal.  No evidence for stone disease.  Less likely gallbladder problem.  Will check serologies for hep A and B and auto-immune hepatitis.

## 2017-02-07 NOTE — H&P
CHIEF COMPLAINT:  Nausea and vomiting.      HISTORY OF PRESENT ILLNESS:  Alba Lee is a 26-year-old female who began noticing very dark urine 5 days ago, began having nausea 3 days ago, also started vomiting at that time.  Has been having quite a bit of difficulty eating or drinking anything because of her symptoms of nausea, vomiting.  Has also been noticing episodes that will happen with pain in her right upper quadrant.  This pain usually happens when she tries to eat anything, usually resolves fairly quickly, does not seem to radiate anywhere else, has never had anything like this before.  Has had quite a few chills with this.  Has not noticed any obvious fevers.  Has not been around anyone else sick that she knows of.  She has been to the urgent care twice in the past few days because of symptoms, most recently earlier in the day today.  At that time, per report, she did have laboratory testing done that showed a mildly elevated ALT and AST levels in the 200 range.  Also, an elevated bilirubin in the 4 range.  She did have a CT scan of the abdomen which per report had no obvious abnormalities.  An ultrasound of the abdomen had mild dilatation of her common bile duct, but no obvious stones noted.  She does note that she did take a supplement a few days before the symptoms started.  The name of the supplement is Khatum.      ALLERGIES:  The patient is allergic to PENICILLIN.      PAST MEDICAL HISTORY:  Depression.      MEDICATIONS:  The patient is taking    1. Effexor extended release 150 mg a day.   2.  Reclipsen 1 tablet a day.      SOCIAL HISTORY:  The patient occasionally smokes, but not on a regular basis.  Also, drinks alcohol in a pattern that is consistent with binge drinking.  She will only drink 2 maybe 3 days a week, but does drink 1-2 bottles of wine when she does drink.  Has not been able to drink any during the time that she has been sick over the past several days.      FAMILY HISTORY:   Father with skin cancer.  No coronary artery disease in the family that the patient knows of.      REVIEW OF SYSTEMS:  Positive for dark urine, nausea, vomiting, abdominal pain and chills as noted above.  Otherwise, a 10-point review of systems is negative for acute problems.      PHYSICAL EXAMINATION:   VITAL SIGNS:  Today show a temperature of 98.4, heart rate 94, blood pressure 125/71, respiratory rate 16, oxygen saturation 96%.   GENERAL:  No acute distress, awake, alert and oriented x3, well-developed, well-nourished.   HEENT:  Head is normocephalic, atraumatic.  Eyes:  Pupils are equal, round, react to light.  Extraocular muscles intact.  Sclerae do have some positive icterus present.  Oropharynx has moist mucous membranes, no lesions.   NECK:  Supple with no masses.   HEART:  Regular, no murmurs.   LUNGS:  Clear to auscultation bilaterally.  The patient is using normal respiratory effort to breathe, no accessory muscle use.   ABDOMEN:  Has positive bowel sounds, soft, mildly tender to palpation in the right upper quadrant, nondistended.   SKIN:  Warm and dry to touch.  No rash over examined skin.   EXTREMITIES:  No pitting edema in the lower extremities.  No cyanosis.   NEUROLOGIC:  Cranial nerves II-XII are grossly intact.  The patient moves all 4 extremities.   PSYCHIATRIC:  The patient has appropriate affect, oriented to person, place and time.      LABORATORY DATA:  Again, the report from laboratory testing in the outside the setting was significant per the report for an ALT and AST in the 200 range and a bilirubin in the 4 range.  Also, ultrasound that showed a dilated common bile duct, per report.  Laboratory testing here show a CRP at 13.7.  .  HCG qualitative is negative.  INR 0.91.      ASSESSMENT AND PLAN:  A 26-year-old female with hepatitis.   1.  Hepatitis.  Will have the patient seen in consultation by Gastroenterology.  Have the patient n.p.o. after midnight.  Have her on continuous IV  fluids.   2.  Common bile duct dilatation.  Have the patient on metronidazole and Levaquin IV for now.  Again, have the patient seen in consultation by Gastroenterology.  May need to have an MRCP.   3.  Depression.  Hold patient's Effexor initially.   4.  Deep venous thrombosis prophylaxis.  Have the patient up and ambulating for DVT prophylaxis.         GAEL BANUELOS DO             D: 2017 20:35   T: 2017 21:23   MT: [id number]      Name:     MECHE SONGPURNIMA SPIVEY   MRN:      [id number]        Account:      [id number]   :      1990           Admitted:     [id number]      Document: [id number]

## 2017-02-07 NOTE — PLAN OF CARE
Problem: Goal Outcome Summary  Goal: Goal Outcome Summary  VSS, URQ tenderness, declining pain mediations, BS active, NPO, no nausea, up independently in room, showered, MRI pending. Continue IV antibiotics as ordered.

## 2017-02-07 NOTE — PLAN OF CARE
Problem: Nausea/Vomiting (Adult)  Goal: Identify Related Risk Factors and Signs and Symptoms  Related risk factors and signs and symptoms are identified upon initiation of Human Response Clinical Practice Guideline (CPG)   Outcome: Improving  Afebrile. Patient rating RUQ abdominal pain 3/10. Patient is satisfied with pain management. Drank two glasses of chicken broth, denies n/v. NPO at midnight. Up independent. IV infusing 100 ml/hr. IV Levaquin and Flagyl. Gastroenterology consult ordered. Will continue to monitor.

## 2017-02-07 NOTE — PROGRESS NOTES
Patient admitted to peds 250 from ED. Vitally stable. Will continue admission navigator and orient patient to the room.

## 2017-02-07 NOTE — PLAN OF CARE
Problem: Nausea/Vomiting (Adult)  Goal: Identify Related Risk Factors and Signs and Symptoms  Related risk factors and signs and symptoms are identified upon initiation of Human Response Clinical Practice Guideline (CPG)   Outcome: No Change  Admitted to 252 at 0050 with history of Asthma, Immune disorder, and hypothyroidism, lung sounds diminished, resp. 18-28, RT to fit for a vest this AM, RT is aware, 02 Sats  % on room air, resting well, some SOB with activity, feeling much improved since the ER, will continue to monitor closely and provide for needs

## 2017-02-08 VITALS
TEMPERATURE: 98 F | WEIGHT: 182.1 LBS | RESPIRATION RATE: 16 BRPM | DIASTOLIC BLOOD PRESSURE: 80 MMHG | SYSTOLIC BLOOD PRESSURE: 130 MMHG | OXYGEN SATURATION: 98 % | BODY MASS INDEX: 33.51 KG/M2 | HEART RATE: 93 BPM | HEIGHT: 62 IN

## 2017-02-08 LAB
ALBUMIN SERPL-MCNC: 3.1 G/DL (ref 3.4–5)
ALP SERPL-CCNC: 208 U/L (ref 40–150)
ALT SERPL W P-5'-P-CCNC: 299 U/L (ref 0–50)
ANA SER QL IA: NORMAL
ANION GAP SERPL CALCULATED.3IONS-SCNC: 11 MMOL/L (ref 3–14)
AST SERPL W P-5'-P-CCNC: 159 U/L (ref 0–45)
BILIRUB SERPL-MCNC: 2.5 MG/DL (ref 0.2–1.3)
BUN SERPL-MCNC: 6 MG/DL (ref 7–30)
CALCIUM SERPL-MCNC: 8.1 MG/DL (ref 8.5–10.1)
CHLORIDE SERPL-SCNC: 107 MMOL/L (ref 94–109)
CO2 SERPL-SCNC: 23 MMOL/L (ref 20–32)
CREAT SERPL-MCNC: 0.59 MG/DL (ref 0.52–1.04)
ERYTHROCYTE [DISTWIDTH] IN BLOOD BY AUTOMATED COUNT: 13.2 % (ref 10–15)
GFR SERPL CREATININE-BSD FRML MDRD: ABNORMAL ML/MIN/1.7M2
GLUCOSE SERPL-MCNC: 101 MG/DL (ref 70–99)
HAV IGM SERPL QL IA: NORMAL
HBV SURFACE AG SERPL QL IA: NONREACTIVE
HCT VFR BLD AUTO: 34.8 % (ref 35–47)
HGB BLD-MCNC: 11 G/DL (ref 11.7–15.7)
MCH RBC QN AUTO: 27.8 PG (ref 26.5–33)
MCHC RBC AUTO-ENTMCNC: 31.6 G/DL (ref 31.5–36.5)
MCV RBC AUTO: 88 FL (ref 78–100)
PLATELET # BLD AUTO: 232 10E9/L (ref 150–450)
POTASSIUM SERPL-SCNC: 3.7 MMOL/L (ref 3.4–5.3)
PROT SERPL-MCNC: 6.4 G/DL (ref 6.8–8.8)
RBC # BLD AUTO: 3.96 10E12/L (ref 3.8–5.2)
SODIUM SERPL-SCNC: 141 MMOL/L (ref 133–144)
WBC # BLD AUTO: 4.1 10E9/L (ref 4–11)

## 2017-02-08 PROCEDURE — 25000132 ZZH RX MED GY IP 250 OP 250 PS 637: Performed by: INTERNAL MEDICINE

## 2017-02-08 PROCEDURE — 99239 HOSP IP/OBS DSCHRG MGMT >30: CPT | Performed by: INTERNAL MEDICINE

## 2017-02-08 PROCEDURE — 36415 COLL VENOUS BLD VENIPUNCTURE: CPT | Performed by: INTERNAL MEDICINE

## 2017-02-08 PROCEDURE — 85027 COMPLETE CBC AUTOMATED: CPT | Performed by: INTERNAL MEDICINE

## 2017-02-08 PROCEDURE — 25000128 H RX IP 250 OP 636: Performed by: INTERNAL MEDICINE

## 2017-02-08 PROCEDURE — 80053 COMPREHEN METABOLIC PANEL: CPT | Performed by: INTERNAL MEDICINE

## 2017-02-08 PROCEDURE — 99232 SBSQ HOSP IP/OBS MODERATE 35: CPT | Performed by: SURGERY

## 2017-02-08 PROCEDURE — 86803 HEPATITIS C AB TEST: CPT | Performed by: INTERNAL MEDICINE

## 2017-02-08 PROCEDURE — 86803 HEPATITIS C AB TEST: CPT | Performed by: PHYSICIAN ASSISTANT

## 2017-02-08 RX ORDER — ONDANSETRON 4 MG/1
TABLET, FILM COATED ORAL EVERY 6 HOURS PRN
COMMUNITY
Start: 2017-02-08 | End: 2018-07-27

## 2017-02-08 RX ORDER — VENLAFAXINE HYDROCHLORIDE 75 MG/1
75 CAPSULE, EXTENDED RELEASE ORAL DAILY
Qty: 10 CAPSULE | Refills: 0 | Status: SHIPPED | OUTPATIENT
Start: 2017-02-08 | End: 2018-06-05

## 2017-02-08 RX ADMIN — DESOGESTREL AND ETHINYL ESTRADIOL 1 TABLET: KIT at 07:56

## 2017-02-08 RX ADMIN — VENLAFAXINE HYDROCHLORIDE 150 MG: 150 TABLET, EXTENDED RELEASE ORAL at 07:56

## 2017-02-08 RX ADMIN — ONDANSETRON 4 MG: 2 INJECTION INTRAMUSCULAR; INTRAVENOUS at 10:22

## 2017-02-08 RX ADMIN — SODIUM CHLORIDE: 9 INJECTION, SOLUTION INTRAVENOUS at 02:58

## 2017-02-08 NOTE — PROGRESS NOTES
Discharge instructions given, iv removed, filled meds will be picked up at [facility] in [place]. Patient will walk to vehicle.

## 2017-02-08 NOTE — PROGRESS NOTES
"[facility]  General Surgery Progress Note           Assessment and Plan:   Assessment:   Possible passed CBD stone, bili improving, other LFTs little changed      Plan:   -HIDA with [facility] scheduled for today, her preference is to be DC'd and have HIDA as outpatient but she'll discuss with mom.  Discussed with hospitalist         Interval History:   doing well; no cp, sob, n/v/d, or abd pain. No problems with PO last night         Physical Exam:   Blood pressure 121/65, pulse 93, temperature 97.8  F (36.6  C), temperature source Oral, resp. rate 16, height 1.575 m (5' 2\"), weight 82.6 kg (182 lb 1.6 oz), last menstrual period 01/30/2017, SpO2 97 %.    I/O last 3 completed shifts:  In: 3535 [P.O.:2460; I.V.:1075]  Out: 900 [Urine:900]    Abdomen:   non-tender             Data:     Recent Labs   Lab Test  02/08/17   0525  02/07/17   0710   HGB  11.0*  11.4*   WBC  4.1  4.0         Recent Labs  Lab 02/08/17  0525 02/07/17  0710 02/06/17  1640   * 153*  --    * 272*  --    GGT  --   --  598*   ALKPHOS 208* 221*  --    BILITOTAL 2.5* 3.4*  --      No results for input(s): LIPASE in the last 168 hours.      Yogi Nance MD     "

## 2017-02-08 NOTE — PLAN OF CARE
Problem: Goal Outcome Summary  Goal: Goal Outcome Summary  Outcome: Improving   Slept most of the night after her sleeping pill. Has been NPO for HIDA scan. IV infusing at 75 cc's/hr. Denies nausea. Rated abdominal pain as zero. Urine is liam colored.

## 2017-02-08 NOTE — DISCHARGE SUMMARY
PRIMARY CARE PHYSICIAN:  AYDE Bonilla.       DATE OF ADMISSION:  02/06/2017.        DATE OF DISCHARGE:  02/08/2017.       DISCHARGE DISPOSITION:  Home.      DISCHARGE CONDITION:  Stable.      PHYSICAL EXAMINATION:   VITAL SIGNS:  Blood pressure is 130/80, heart rate is 93, respiratory 16, oxygen is 98% on room air, temperature is 98.   IN GENERAL:  She is awake and alert, comfortable without any acute distress.  She is calm and cooperative.   ABDOMEN:  Soft.  There is very minimal tenderness in the right upper quadrant.  No rebound or guarding.   LUNGS:  Clear.   HEART:  Cardiac exam reveals regular rate and rhythm, normal S1, S2.      DISCHARGE DIAGNOSES:   1.  Abnormal liver function testing and right upper quadrant pain.  The exact etiology is unclear.  Possible viral infection as the culprit.  Potentially could be a passed gallbladder stone, although 3 imaging tests - ultrasound, CT and MRCP - do not show any gallstones. Other possibility is use of a herbal supplement by patient. Plan for close outpatient followup.   2.  Issues with nausea and vomiting.  Suspect related to increased dose of venlafaxine.  Plan to lower the dose and outpatient follow with her primary care provider.   3.  Depression and anxiety.      CONSULTATIONS:    1.  Gastroenterology.    2.  General Surgery.      IMAGING:  MRCP      PENDING LAB TESTS:  Hepatitis serologies     HISTORY OF ILLNESS:  Please refer to the admission H&P for full details.  In brief, Alba ParksYsabelRosa is a 26-year-old female who presented to the hospital because of abdominal pain, nausea and vomiting.      HOSPITAL COURSE:  The patient was found to have elevated liver function testing at the time of presentation.  She had elevated ALT, AST and bilirubin.  The patient had a CT and ultrasound of the abdomen prior to admission, which showed mildly dilated gallbladder without any calculus or stones and no acute pathology.      The patient was seen by General  Surgery and GI in consultation.  Her symptoms improved with supportive cares. She is feeling tremendously better at this time.  She had an MRCP done as well which did not show any CBD stone or choledocholithiasis.  Her LFT's are mostly stable, her bilirubin is coming down.       At this time, patient feels a lot better.  She had an episode of emesis this morning after she took her Effexor on an empty stomach, but afterwards has been able to tolerate a diet without any difficulties.  At this time she feels very well and requesting discharge home.      There was plan for possible HIDA scan to evaluate for functional gallbladder syndrome, although she declines that and wishes to follow up with her outpatient provider. I have discussed the case with General Surgery and overall it looks less likely to be gallbladder pathology as a cause of her symptoms anyway.     It is possible that she had a viral infection recently causing the mild hepatitis that is resolving now.  Her serologies for hepatitis A, B, mononucleosis screen were negative.  So was GABRIELE testing.  Some other stuff including Hep C serologies is currently pending.  Again, there was some concern that she might have passed a gallbladder stone and this is conceivable, although with 3 imaging tests being negative for any stones, it seems unlikely.  She had a negative MRCP as well. Of note, she was taking a supplement as an outpatient, which is something called khatrum.  She is advised not to take that anymore as it might have been contributing to her liver function problems.     At this time, the patient will need close outpatient follow up with repeat liver function testing and follow up with primary clinic and Gastroenterology.      Of note, the patient has mentioned that she was stable on Effexor 75 mg daily for about 2 years and recently the dose was increased to 150 mg daily and she has felt some increased nausea since then.  She felt that it has not really  helped her symptoms much and is requesting to go back on 75 mg daily.  She was given a small prescription for the 75 mg tablets and advised outpatient follow with primary provider to further discuss this in more detail.          DISCHARGE FOLLOWUP:   1.  With the primary care provider within 1 week for hospital followup.   2.  Follow up with GI in 1-2 weeks.   3.  Have repeat liver function testing when she is at her primary care provider within the next 1 week.      Advised the patient no alcohol and minimize use of Tylenol.      Total time spent in face-to-face contact with the patient and coordinating discharge was more than 30 minutes.      Discharge Medication List as of 2/8/2017 12:10 PM      CONTINUE these medications which have CHANGED    Details   venlafaxine (EFFEXOR-XR) 75 MG 24 hr capsule Take 1 capsule (75 mg) by mouth daily, Disp-10 capsule, R-0, E-Prescribe         CONTINUE these medications which have NOT CHANGED    Details   ondansetron (ZOFRAN) 4 MG tablet Take by mouth every 6 hours as needed for nausea, Historical      desogestrel-ethinyl estradiol (RECLIPSEN) 0.15-30 MG-MCG per tablet Take 1 tablet by mouth daily, Historical           Results for orders placed or performed during the hospital encounter of 02/06/17   MR Abdomen MRCP w/o & w Contrast    Narrative    MR ABDOMEN MRCP WITHOUT AND WITH CONTRAST  2/7/2017  12:37 PM     HISTORY: Dilated common bile duct.      TECHNIQUE:  Multisequence, multiplanar imaging is performed through  the biliary system. A total of 15 mL Gadavist is injected  intravenously followed by dynamic axial T1 fat sat sequences.    FINDINGS:     Abdomen: The liver, pancreas, adrenal glands and kidneys are  unremarkable. No hydronephrosis. No evidence of fatty liver  infiltration or mass. No enlarged lymph nodes. Imaged portions of  bowel are nondistended. Spleen is enlarged measuring nearly 16 cm in  craniocaudal dimension on series 5, image 16. No focal splenic  mass.    MRCP sequences show no evidence of intra or extrahepatic bile duct  dilatation. The gallbladder is unremarkable. No evidence of gallstones  or sludge. No biliary stones are evident. Pancreatic duct is normal in  caliber but empties in the minor papilla consistent with pancreas  divisum anomaly. This is best seen on series 6, image 21.    Following contrast administration, the upper abdominal organs enhance  normally. No evidence of ascites.      Impression    IMPRESSION:  1. Splenomegaly without focal mass. No enlarged abdominal lymph nodes.  2. Liver is unremarkable. No evidence of fatty infiltration or mass.  3. No evidence of gallstones or bile duct dilatation. Pancreas divisum  anomaly is noted. No peripancreatic inflammation to suggest  pancreatitis.    JONATHAN FARMER MD          Allergies   Allergen Reactions     Penicillins Hives          MARTÍN HILTON MD             D: 2017 15:49   T: 2017 16:57   MT: [id number]      Name:     PURNIMA DICKINSON   MRN:      [id number]        Account:        [id number]   :      1990           Admit Date:                                       Discharge Date: 2017      Document: [id number]       cc: Jozef MESSER

## 2017-02-08 NOTE — PLAN OF CARE
"Problem: Goal Outcome Summary  Goal: Goal Outcome Summary  Outcome: Improving  Patient stable. Slight yellowing of bilateral sclera noted. Patient tolerating regular diet. Voiding. Ambulating in room. Denies pain. Complained of discomfort in arm after labs drawn. PO motrin given. Denies abdominal tenderness. IV antibiotics discontinued post MRI results. Plan for labs in AM. Patient becoming anxious to discharge and has repeatedly verbalized a desire to go home \"as soon as possible\". Will continue to monitor and provide for cares.         "

## 2017-02-08 NOTE — PLAN OF CARE
Problem: Goal Outcome Summary  Goal: Goal Outcome Summary  Outcome: Improving  Patient up independently in room. Ambulated to bathroom. Had two bouts of emesis, first one had undigested pill in it, second was water only. She believes it is from the effexor she had this morning. States pain is minimal, mostly soreness rather than pain. Tolerated low fat diet after zofran given for emesis.

## 2017-02-08 NOTE — PROGRESS NOTES
"GASTROENTEROLOGY PROGRESS NOTE     SUBJECTIVE:  Had a bout of emesis an hour after venlafaxine given. Otherwise, tolerated diet without increased pain or n/v yesterday evening. Reports minimal RUQ abdominal discomfort, not requiring pain meds.      OBJECTIVE:  /80 mmHg  Pulse 93  Temp(Src) 98  F (36.7  C) (Oral)  Resp 16  Ht 1.575 m (5' 2\")  Wt 82.6 kg (182 lb 1.6 oz)  BMI 33.30 kg/m2  SpO2 98%  LMP 2017  Temp (24hrs), Av.2  F (36.8  C), Min:97.8  F (36.6  C), Max:98.5  F (36.9  C)    Patient Vitals for the past 72 hrs:   Weight   17 2020 82.6 kg (182 lb 1.6 oz)       Intake/Output Summary (Last 24 hours) at 17 1057  Last data filed at 17 0258   Gross per 24 hour   Intake   3535 ml   Output    900 ml   Net   2635 ml        PHYSICAL EXAM  Gen: alert, oriented, no jaundice  CV: RRR  Lungs: clear  Abd: soft, +BS, very mild RUQ/epigastric tenderness, no rebound     Additional Comments:  ROS, FH, SH: See initial GI consult for details.     I have reviewed the patient's new clinical lab results:     Recent Labs   Lab Test  17   0525  17   0710  17   1640   WBC  4.1  4.0   --    HGB  11.0*  11.4*   --    MCV  88  89   --    PLT  232  212   --    INR   --    --   0.91     Recent Labs   Lab Test  17   0525  17   0710   POTASSIUM  3.7  3.8   CHLORIDE  107  108   CO2  23  24   BUN  6*  7   ANIONGAP  11  10     Recent Labs   Lab Test  17   0525  17   0710   ALBUMIN  3.1*  3.2*   BILITOTAL  2.5*  3.4*   ALT  299*  272*   AST  159*  153*     IMAGIN/7 MRCP:  IMPRESSION:  1. Splenomegaly without focal mass. No enlarged abdominal lymph nodes.  2. Liver is unremarkable. No evidence of fatty infiltration or mass.  3. No evidence of gallstones or bile duct dilatation. Pancreas divisum  anomaly is noted. No peripancreatic inflammation to suggest  Pancreatitis.     2/ Abd US Urgent Care:  - no gall stones. +Gall bladder wall thickening.   "   Assessment:  Alba Lee is a 26 year old female without significant medical history who presents to the emergency room with abdominal pain, nausea, and vomiting found to have elevated LFTs without evidence of biliary obstruction on imaging, although gallbladder wall thickening was seen. MRCP negative for choledocholithiasis, pancreatic divisum noted.     1) Elevated LFTs and hyperbilirubinemia. Improving/stable. Most likely etiology is transient obstructing gall stone with secondary cholecystitis. Us shows gall bladder wall thickening, but no stones. MRCP negative. Hepatitis A, B negative. INR normal. GABRIELE/smooth muscle mariano pending. Total bili 4.6-->2.5, Alk phos 221 -->208, -->299, -->159. WBC normal. No fevers.     --Surgery ordered HIDA but given trend in labs and improvement clinically, possibly to be done outpatient.   --Vomiting this am, likely medication related. Otherwise, patient doing well.   --Autoimmune hepatitis labs pending.     Plan:  --HIDA as planned per surgery, outpatient vs inpatient.   --Await GABRIELE/smooth muscle mariano. Add on hepatitis C mariano.   --If patient clinically remains asymptomatic/stable, can discharge with repeat LFTs/bilirubin checked in the next 5-7 days to ensure trending down with primary clinic.  --If discharges today, instructed to return to hospital if pain worsens.   --Ok for low fat diet from GI perspective.     Estella Dexter PA-C  [facility]

## 2017-02-09 LAB
HCV AB SERPL QL IA: NORMAL
SMA IGG SER-ACNC: 9

## 2017-02-14 ENCOUNTER — OFFICE VISIT (OUTPATIENT)
Dept: PEDIATRICS | Facility: CLINIC | Age: 27
End: 2017-02-14
Payer: COMMERCIAL

## 2017-02-14 VITALS
WEIGHT: 184.2 LBS | HEIGHT: 62 IN | DIASTOLIC BLOOD PRESSURE: 64 MMHG | BODY MASS INDEX: 33.9 KG/M2 | OXYGEN SATURATION: 98 % | SYSTOLIC BLOOD PRESSURE: 102 MMHG | TEMPERATURE: 97 F | HEART RATE: 93 BPM

## 2017-02-14 DIAGNOSIS — D64.9 ANEMIA, UNSPECIFIED TYPE: ICD-10-CM

## 2017-02-14 DIAGNOSIS — L85.3 DRY SKIN: ICD-10-CM

## 2017-02-14 DIAGNOSIS — K75.9 HEPATITIS: ICD-10-CM

## 2017-02-14 DIAGNOSIS — F41.8 DEPRESSION WITH ANXIETY: Primary | ICD-10-CM

## 2017-02-14 DIAGNOSIS — L29.9 ITCHING: ICD-10-CM

## 2017-02-14 LAB
ALBUMIN SERPL-MCNC: 3.7 G/DL (ref 3.4–5)
ALP SERPL-CCNC: 176 U/L (ref 40–150)
ALT SERPL W P-5'-P-CCNC: 486 U/L (ref 0–50)
ANION GAP SERPL CALCULATED.3IONS-SCNC: 8 MMOL/L (ref 3–14)
AST SERPL W P-5'-P-CCNC: 117 U/L (ref 0–45)
BASOPHILS # BLD AUTO: 0 10E9/L (ref 0–0.2)
BASOPHILS NFR BLD AUTO: 0.7 %
BILIRUB SERPL-MCNC: 0.9 MG/DL (ref 0.2–1.3)
BUN SERPL-MCNC: 11 MG/DL (ref 7–30)
CALCIUM SERPL-MCNC: 9.3 MG/DL (ref 8.5–10.1)
CHLORIDE SERPL-SCNC: 105 MMOL/L (ref 94–109)
CO2 SERPL-SCNC: 27 MMOL/L (ref 20–32)
CREAT SERPL-MCNC: 0.69 MG/DL (ref 0.52–1.04)
DIFFERENTIAL METHOD BLD: NORMAL
EOSINOPHIL # BLD AUTO: 0.2 10E9/L (ref 0–0.7)
EOSINOPHIL NFR BLD AUTO: 4.3 %
ERYTHROCYTE [DISTWIDTH] IN BLOOD BY AUTOMATED COUNT: 13.2 % (ref 10–15)
FOLATE SERPL-MCNC: 19.4 NG/ML
GFR SERPL CREATININE-BSD FRML MDRD: ABNORMAL ML/MIN/1.7M2
GLUCOSE SERPL-MCNC: 92 MG/DL (ref 70–99)
HCT VFR BLD AUTO: 41.4 % (ref 35–47)
HGB BLD-MCNC: 13.2 G/DL (ref 11.7–15.7)
INR PPP: 0.87 (ref 0.86–1.14)
LYMPHOCYTES # BLD AUTO: 1.8 10E9/L (ref 0.8–5.3)
LYMPHOCYTES NFR BLD AUTO: 31.7 %
MCH RBC QN AUTO: 28.4 PG (ref 26.5–33)
MCHC RBC AUTO-ENTMCNC: 31.9 G/DL (ref 31.5–36.5)
MCV RBC AUTO: 89 FL (ref 78–100)
MONOCYTES # BLD AUTO: 0.5 10E9/L (ref 0–1.3)
MONOCYTES NFR BLD AUTO: 8.2 %
NEUTROPHILS # BLD AUTO: 3.1 10E9/L (ref 1.6–8.3)
NEUTROPHILS NFR BLD AUTO: 55.1 %
PLATELET # BLD AUTO: 306 10E9/L (ref 150–450)
POTASSIUM SERPL-SCNC: 4.3 MMOL/L (ref 3.4–5.3)
PROT SERPL-MCNC: 7.7 G/DL (ref 6.8–8.8)
RBC # BLD AUTO: 4.65 10E12/L (ref 3.8–5.2)
SODIUM SERPL-SCNC: 140 MMOL/L (ref 133–144)
VIT B12 SERPL-MCNC: 575 PG/ML (ref 193–986)
WBC # BLD AUTO: 5.6 10E9/L (ref 4–11)

## 2017-02-14 PROCEDURE — 80053 COMPREHEN METABOLIC PANEL: CPT | Performed by: NURSE PRACTITIONER

## 2017-02-14 PROCEDURE — 85610 PROTHROMBIN TIME: CPT | Performed by: NURSE PRACTITIONER

## 2017-02-14 PROCEDURE — 36415 COLL VENOUS BLD VENIPUNCTURE: CPT | Performed by: NURSE PRACTITIONER

## 2017-02-14 PROCEDURE — 82746 ASSAY OF FOLIC ACID SERUM: CPT | Performed by: NURSE PRACTITIONER

## 2017-02-14 PROCEDURE — 99214 OFFICE O/P EST MOD 30 MIN: CPT | Performed by: NURSE PRACTITIONER

## 2017-02-14 PROCEDURE — 82607 VITAMIN B-12: CPT | Performed by: NURSE PRACTITIONER

## 2017-02-14 PROCEDURE — 85025 COMPLETE CBC W/AUTO DIFF WBC: CPT | Performed by: NURSE PRACTITIONER

## 2017-02-14 RX ORDER — HYDROXYZINE HYDROCHLORIDE 25 MG/1
25-50 TABLET, FILM COATED ORAL EVERY 6 HOURS PRN
Qty: 60 TABLET | Refills: 1 | Status: SHIPPED | OUTPATIENT
Start: 2017-02-14 | End: 2018-03-21

## 2017-02-14 NOTE — MR AVS SNAPSHOT
After Visit Summary   2/14/2017    Alba Lee    MRN: [id number]           Patient Information     Date Of Birth          1990        Visit Information        Provider Department      2/14/2017 8:40 AM Marlene Coto APRN [facility]        Today's Diagnoses     Depression with anxiety    -  1    Hepatitis        Anemia, unspecified type        Dry skin        Itching          Care Instructions    1. Please see GI asap even though you are feeling better  2. Please see psychiatry. If you would like to establish care with me please schedule a f/u visit  3. I will get back to you about labs.          Follow-ups after your visit        Additional Services     DERMATOLOGY REFERRAL       Your provider has referred you to: [facility]: [facility] [phone]     Please be aware that coverage of these services is subject to the terms and limitations of your health insurance plan.  Call member services at your health plan with any benefit or coverage questions.      Please bring the following with you to your appointment:    (1) Any X-Rays, CTs or MRIs which have been performed.  Contact the facility where they were done to arrange for  prior to your scheduled appointment.    (2) List of current medications  (3) This referral request   (4) Any documents/labs given to you for this referral            MENTAL HEALTH REFERRAL       Your provider has referred you to: [facility]: [employer] [facility] - [facility] [phone]   [email or web address]   *Referral from [facility] Primary Care Provider required - Consultation Model - medication management & future refills will be returned to [facility] PCP upon completion of evaluation  *Please call to schedule an appointment.    All scheduling is subject to the client's specific insurance plan & benefits,  "provider/location availability, and provider clinical specialities.  Please arrive 15 minutes early for your first appointment and bring your completed paperwork.    Please be aware that coverage of these services is subject to the terms and limitations of your health insurance plan.  Call member services at your health plan with any benefit or coverage questions.                  Who to contact     If you have questions or need follow up information about today's clinic visit or your schedule please contact [facility] directly at [phone].  Normal or non-critical lab and imaging results will be communicated to you by Swapseehart, letter or phone within 4 business days after the clinic has received the results. If you do not hear from us within 7 days, please contact the clinic through Swapseehart or phone. If you have a critical or abnormal lab result, we will notify you by phone as soon as possible.  Submit refill requests through [employer] or call your pharmacy and they will forward the refill request to us. Please allow 3 business days for your refill to be completed.          Additional Information About Your Visit        [employer] Information     [employer] lets you send messages to your doctor, view your test results, renew your prescriptions, schedule appointments and more. To sign up, go to [email or web address] . Click on \"Log in\" on the left side of the screen, which will take you to the Welcome page. Then click on \"Sign up Now\" on the right side of the page.     You will be asked to enter the access code listed below, as well as some personal information. Please follow the directions to create your username and password.     Your access code is: [email or web address]  Expires: 2017 11:46 AM     Your access code will  in 90 days. If you need help or a new code, please call your [facility] or [phone].        Care EveryWhere ID     This is your Care EveryWhere ID. This could be used by other " "organizations to access your [place] medical records  [id number]        Your Vitals Were     Pulse Temperature Height Last Period Pulse Oximetry BMI (Body Mass Index)    93 97  F (36.1  C) (Tympanic) 5' 2\" (1.575 m) 01/30/2017 98% 33.69 kg/m2       Blood Pressure from Last 3 Encounters:   02/14/17 102/64   02/08/17 130/80   02/05/12 116/75    Weight from Last 3 Encounters:   02/14/17 184 lb 3.2 oz (83.6 kg)   02/06/17 182 lb 1.6 oz (82.6 kg)   02/05/12 140 lb (63.5 kg)              We Performed the Following     CBC with platelets differential     Comprehensive metabolic panel (BMP + Alb, Alk Phos, ALT, AST, Total. Bili, TP)     DERMATOLOGY REFERRAL     Folate     INR     MENTAL HEALTH REFERRAL     Vitamin B12          Today's Medication Changes          These changes are accurate as of: 2/14/17  9:40 AM.  If you have any questions, ask your nurse or doctor.               Start taking these medicines.        Dose/Directions    hydrOXYzine 25 MG tablet   Commonly known as:  ATARAX   Used for:  Itching   Started by:  Marlene Coto APRN CNP        Dose:  25-50 mg   Take 1-2 tablets (25-50 mg) by mouth every 6 hours as needed for itching   Quantity:  60 tablet   Refills:  1            Where to get your medicines      These medications were sent to [id number] - [place] - [address]  [address] [place] [address]     Phone:  [phone]     hydrOXYzine 25 MG tablet                Primary Care Provider Office Phone # Fax #    Jozef Zelaya PA-C [phone]       [facility] [address] [place] [address]        Thank you!     Thank you for choosing [facility]  for your care. Our goal is always to provide you with excellent care. Hearing back from our patients is one way we can continue to improve our services. Please take a few minutes to complete the written survey that you may receive in the mail after " your visit with us. Thank you!             Your Updated Medication List - Protect others around you: Learn how to safely use, store and throw away your medicines at www.disposemymeds.org.          This list is accurate as of: 2/14/17  9:40 AM.  Always use your most recent med list.                   Brand Name Dispense Instructions for use    hydrOXYzine 25 MG tablet    ATARAX    60 tablet    Take 1-2 tablets (25-50 mg) by mouth every 6 hours as needed for itching       ondansetron 4 MG tablet    ZOFRAN     Take by mouth every 6 hours as needed for nausea Reported on 2/14/2017       RECLIPSEN 0.15-30 MG-MCG per tablet   Generic drug:  desogestrel-ethinyl estradiol      Take 1 tablet by mouth daily       venlafaxine 75 MG 24 hr capsule    EFFEXOR-XR    10 capsule    Take 1 capsule (75 mg) by mouth daily

## 2017-02-14 NOTE — NURSING NOTE
"Chief Complaint   Patient presents with     Hospital F/U       Initial /64 (Cuff Size: Adult Large)  Pulse 93  Temp 97  F (36.1  C) (Tympanic)  Ht 5' 2\" (1.575 m)  Wt 184 lb 3.2 oz (83.6 kg)  LMP 01/30/2017  SpO2 98%  BMI 33.69 kg/m2 Estimated body mass index is 33.69 kg/(m^2) as calculated from the following:    Height as of this encounter: 5' 2\" (1.575 m).    Weight as of this encounter: 184 lb 3.2 oz (83.6 kg).  Medication Reconciliation: complete   Karyn Crowley CMA      "

## 2017-02-14 NOTE — PROGRESS NOTES
SUBJECTIVE:                                                    Alba Lee is a 26 year old female who presents to clinic today for hospital follow-up after admission to [facility] from 2/6/17-2/8/17.     Brief hospital course: Refer to hospital admission H&P for full details. Alba presented to the ED on 2/6/17 with complaints of abdominal pain, nausea, and vomiting. She was found to have elevated liver function testing at time pf presentation including elevated ALT, AST, and bilirubin. Patient had CT of the abdomen and US of the abdomen prior to admission, which showed mildly dilated gallbladder without any calculus or stones and no acute pathology. She had an MRCP done as well which did not show any CBD stone or choledocholithiasis. Patient was seen by General Surgery and GI in consultation who felt this was less likely to be gallbladder pathology as a cause of her elevated liver function testing and pain. They believed it was possible she had a viral infection recently causing the mild hepatitis. Serologies for hepatits A, B, C, mononucleosis screen were negative as was GABRIELE testing. There was some concern that patient may have passed a gallbladder stone and this is conceivable, although with 3 imaging tests being negative for any stones, General Surgery felt this to be very unlikely. Patient's symptoms resolved on their own, she began feeling tremendously better, and she began tolerating a regular diet. Liver function testing improved and was mostly stable with bilirubin continuing to come down. Patient was discharged on 2/8/17. Of note, the patient mentioned during admission that she was stable on Effexor 75 mg daily for about 2 years and recently the dose was increased to 150 mg daily and she felt increased nausea since then. She requested to go back on 75 mg daily and was given a small prescription for the 75 mg tablets and advised outpatient follow with PCP to further discuss this in  "more detail. She also endorsed she was taking a nutritional supplement as an outpatient, called yvonne. She was advised not to take that anymore as it might have been contributing to her liver function problems.     Since discharge, patient states she is doing much better. She denies abdominal pain, nausea, and vomiting. States she is bloated but is having bowel movements every day and denies constipation or diarrhea. Urine has returned to clear yellow. She is tolerating a normal low-fat diet and attempting to make lifestyle changes including low-fat diet and increased exercise. She has not had a drink of alcohol in 15 days. Her chief complaint today is generalized pruritis that has been ongoing since presenting to the ED and is moderately responsive to benadryl. She has also noted some increased perspiration. She has completely stopped taking her Effexor 75 mg tabs due to wishing to \"detox\" her body. She states today that she feels happier and more energetic than she has in a long time. Last dose taken 2/6/17. Patient is also requesting a dermatology referral today for a chronic area of localozed pruruitis and a mole located in her pubic region. She has had this evaluated by her PCP previously who referred her to derm, however she never made the appt. due to inconvenient location.       Diagnostic Tests/Treatments reviewed.  Follow up needed: GI follow-up and Psych referral.  Other Healthcare Providers Involved in Patient s Care:         GI  Update since discharge: improved. See above HPI.  Post Discharge Medication Reconciliation: discharge medications reconciled, continue medications without change.  Plan of care communicated with patient     Coding guidelines for this visit:  Type of Medical   Decision Making Face-to-Face Visit       within 7 Days of discharge Face-to-Face Visit        within 14 days of discharge   Moderate Complexity [id number]   High Complexity [id number]          Problem list and histories " "reviewed & adjusted, as indicated.  Additional history: as documented    Patient Active Problem List   Diagnosis     Hepatitis     Past Surgical History   Procedure Laterality Date     No history of surgery         Social History   Substance Use Topics     Smoking status: Current Some Day Smoker     Smokeless tobacco: Never Used     Alcohol use Yes      Comment: social (binge drinks), blackouts; no sz or DTs when stops     History reviewed. No pertinent family history.      Current Outpatient Prescriptions   Medication Sig Dispense Refill     hydrOXYzine (ATARAX) 25 MG tablet Take 1-2 tablets (25-50 mg) by mouth every 6 hours as needed for itching 60 tablet 1     desogestrel-ethinyl estradiol (RECLIPSEN) 0.15-30 MG-MCG per tablet Take 1 tablet by mouth daily       venlafaxine (EFFEXOR-XR) 75 MG 24 hr capsule Take 1 capsule (75 mg) by mouth daily (Patient not taking: Reported on 2/14/2017) 10 capsule 0     ondansetron (ZOFRAN) 4 MG tablet Take by mouth every 6 hours as needed for nausea Reported on 2/14/2017       Allergies   Allergen Reactions     Penicillins Hives       ROS:  Unless otherwise noted in HPI, complete ROS is negative.    OBJECTIVE:                                                    /64 (Cuff Size: Adult Large)  Pulse 93  Temp 97  F (36.1  C) (Tympanic)  Ht 5' 2\" (1.575 m)  Wt 184 lb 3.2 oz (83.6 kg)  LMP 01/30/2017  SpO2 98%  BMI 33.69 kg/m2  Body mass index is 33.69 kg/(m^2).  GENERAL: healthy, alert and no distress  RESP: lungs clear to auscultation - no rales, rhonchi or wheezes  CV: regular rate and rhythm, normal S1 S2, no S3 or S4, no murmur, click or rub, no peripheral edema and peripheral pulses strong  ABDOMEN: soft, nontender, no hepatosplenomegaly, no masses and bowel sounds normal  PSYCH: mentation appears normal, affect normal/bright    Diagnostic Test Results:  none     Laboratory results:  CMP pending     ASSESSMENT/PLAN:                                                  " "    (F41.8) Depression with anxiety  (primary encounter diagnosis)  Comment: longstanding history of mental health issues including anxiety and depression. Has been stable on 75 mg Effexor until recently when the dose was increased to 150 mg daily by Jozef Zelaya PA-C with [facility]. Marcellus increased nausea with some vomiting since that time and requested dose be changed back to 75 mg upon discharge from the hospital on 2/8/17. Reports today that she stopped taking the medication altogether in hopes to \"detox\" her body. States she feels happier and more energetic. Denies bipolar personality traits. Considering her longstanding mental health history, I am reluctant to believe her anxiety will disappear, however I understand her wish to attempt being off medication. I recommend she see Psychiatry for eval and although hesitant, she is agreeable to this. Pt. would like to avoid \"going through all of that again,\" in regards to work-up and medication trials. States she has been on many different mediations previously, including Prozac, Zoloft, and Celexa, one of which made her \"crazy.\" I am not concerned with keeping her off Effexor for now as I believe she is low risk for withdrawal considering her last dose was 8 days ago. Vital signs stable today.  Plan:        - MENTAL HEALTH REFERRAL       - Okay to continue off Effexor for now.      (K75.9) Hepatitis  Comment: Brief hospital course: Presented to the ED on 2/6/17 with abdominal pain, nausea, and vomiting. Was found to have elevated liver function testing and CT, US of the abdomen showed mildly dilated gallbladder but otherwise negative. MRCP negative. General Surgery and GI felt this was less likely to be gallbladder pathology and that recent viral infection was possible causing mild hepatitis. Serologies for hepatits A, B, C, mononucleosis screen, and GABRIELE testing were negative. Some concern that patient may have passed a gallbladder stone, although unlikely " considering negative imaging. Symptoms resolved spontaneously, and liver function testing improved prior to discharge on 2/8/17. Of note, pt. endorsed to taking a nutritional supplement as an outpatient, called yvonne.     Since discharge, patient states she is doing much better. Denies abdominal pain, nausea, vomiting, constipation, an diarrhea. Urine has returned to clear yellow. She is tolerating a normal diet and attempting to make lifestyle changes including low-fat diet and increased exercise. She has not had a drink of alcohol in 15 days.   Plan:        - Comprehensive metabolic panel (BMP + Alb, Alk Phos, ALT, AST, Total. Bili, TP)       - Follow-up with GI within the next week.    (D64.9) Anemia, unspecified type  Comment: Patient is unaware of any history of anemia. I think her anemia could be the result of many different things including dilution from IV fluids received during hospital admission, alcohol consumption, or vitamin B12 deficiency. Is on OCP and endorses light periods. No other signs/symptoms of bleeding.    Plan:        - Comprehensive metabolic panel (BMP + Alb, Alk Phos, ALT, AST, Total. Bili, TP)       - CBC with platelets differential       - Vitamin B12       - Folate       - INR    (L85.3) Dry skin  Comment: Pt. requesting a dermatology referral today for a chronic area of localized pruruitis and a mole located in her pubic region. She has had this evaluated by her PCP previously who referred her to derm, however she never made the appt. due to inconvenient location. She wishes to be seen here at [facility].  Plan:        - DERMATOLOGY REFERRAL            (L29.9) Itching  Comment: Most likely a result of her liver dysfunction. She states the itching is extremely disruptive and occasionally awakes her during the night. Benadryl does help but doesn't resolve symptoms completely. I feel it is reasonable to try some Hydroxyzine for her pruritis until liver function returns to normal.    Plan:        - hydrOXYzine (ATARAX) 25 MG tablet       - Do not take Benadryl while taking Hydroxyzine       - Counseled on medication side effects including drowsiness      Follow-up: if patient would like to establish care here, please schedule an appt. for follow-up. Plan to see GI for follow-up within the next week. Will follow-up on any abnormal lab results.    Nanda Levine RN, FNP-DNP Student  [facility]     Nanda Levine participated in the care of the patient. The above documentation reflects my personal history and physical exam findings.     Nanda Levine participated in the care of the patient. The above documentation reflects my personal history and physical exam findings.     OSCAR Shay [facility]

## 2017-02-14 NOTE — PATIENT INSTRUCTIONS
1. Please see GI asap even though you are feeling better  2. Please see psychiatry. If you would like to establish care with me please schedule a f/u visit  3. I will get back to you about labs.

## 2017-02-16 ENCOUNTER — MYC MEDICAL ADVICE (OUTPATIENT)
Dept: PEDIATRICS | Facility: CLINIC | Age: 27
End: 2017-02-16

## 2017-02-16 DIAGNOSIS — K75.9 HEPATITIS: Primary | ICD-10-CM

## 2017-02-16 NOTE — TELEPHONE ENCOUNTER
I called patient, and she has been notified of lab results.  Lab appointment scheduled tomorrow.  No further questions.  NILAY Alejandra RN

## 2017-02-17 DIAGNOSIS — K75.9 HEPATITIS: ICD-10-CM

## 2017-02-17 PROCEDURE — 36415 COLL VENOUS BLD VENIPUNCTURE: CPT | Performed by: NURSE PRACTITIONER

## 2017-02-17 PROCEDURE — 80076 HEPATIC FUNCTION PANEL: CPT | Performed by: NURSE PRACTITIONER

## 2017-02-18 LAB
ALBUMIN SERPL-MCNC: 3.6 G/DL (ref 3.4–5)
ALP SERPL-CCNC: 133 U/L (ref 40–150)
ALT SERPL W P-5'-P-CCNC: 213 U/L (ref 0–50)
AST SERPL W P-5'-P-CCNC: 38 U/L (ref 0–45)
BILIRUB DIRECT SERPL-MCNC: 0.5 MG/DL (ref 0–0.2)
BILIRUB SERPL-MCNC: 0.6 MG/DL (ref 0.2–1.3)
PROT SERPL-MCNC: 7.5 G/DL (ref 6.8–8.8)

## 2017-02-20 PROBLEM — F41.8 DEPRESSION WITH ANXIETY: Status: ACTIVE | Noted: 2017-02-14

## 2017-02-20 PROBLEM — F41.8 DEPRESSION WITH ANXIETY: Status: ACTIVE | Noted: 2017-02-20

## 2017-03-20 ENCOUNTER — TELEPHONE (OUTPATIENT)
Dept: PEDIATRICS | Facility: CLINIC | Age: 27
End: 2017-03-20

## 2017-03-20 NOTE — TELEPHONE ENCOUNTER
Panel Management Review      Patient has the following on her problem list:   Depression / Dysthymia review  No flowsheet data found.   Patient is due for:  PHQ9      Composite cancer screening  Chart review shows that this patient is due/due soon for the following Pap Smear  Summary:    Patient is due/failing the following:   PAP, PHQ9 and PHYSICAL    Action needed:   Patient needs office visit for pap & physical. and Patient needs to do PHQ9.    Type of outreach:    Sent [employer] message.   Per chart notes patient had pap at [facility] Jan 2017 - NIL result.    Questions for provider review:    None                                                                                                                                    Karyn Crowley CMA    Chart routed to care team.

## 2017-03-20 NOTE — LETTER
[facility]  [address]  [place] [address]  [phone]      April 4, 2017    Alba Lee                                                                                                                                                       [address]   [place] [address]              Dear Alba,    Just doing a check in with you to see how things are going.  It would be helpful if you would let us know what you decided to do.  I hope you have been able to see psychiatry and have gotten in to see a gastroenterolgist.  Both of these things would be extremely helpful for you.    I hope things are going well for you.    Please give us a call at [phone] to give us an update on how you are doing.    Thank you!      Sincerely,  Marlene Coto, CNP

## 2018-03-21 ENCOUNTER — MYC MEDICAL ADVICE (OUTPATIENT)
Dept: PEDIATRICS | Facility: CLINIC | Age: 28
End: 2018-03-21

## 2018-03-21 DIAGNOSIS — Z30.9 ENCOUNTER FOR CONTRACEPTIVE MANAGEMENT, UNSPECIFIED TYPE: ICD-10-CM

## 2018-03-21 DIAGNOSIS — L29.9 ITCHING: Primary | ICD-10-CM

## 2018-03-21 RX ORDER — DESOGESTREL AND ETHINYL ESTRADIOL 0.15-0.03
1 KIT ORAL DAILY
Qty: 84 TABLET | Refills: 0 | Status: SHIPPED | OUTPATIENT
Start: 2018-03-21 | End: 2018-07-18

## 2018-03-21 RX ORDER — HYDROXYZINE HYDROCHLORIDE 25 MG/1
25-50 TABLET, FILM COATED ORAL EVERY 6 HOURS PRN
Qty: 60 TABLET | Refills: 0 | Status: SHIPPED | OUTPATIENT
Start: 2018-03-21 | End: 2018-07-27

## 2018-03-21 NOTE — TELEPHONE ENCOUNTER
Reclipsen is entered historically.    Hydroxyzine 25 mg  Last Written Prescription Date:  02/14/17  Last Fill Quantity: 60,  # refills: 1   Last office visit: 2/14/2017 with prescribing provider:  02/14/17   Future Office Visit:    Routing refill request to provider for review/approval because:  Patient needs to be seen because it has been more than 1 year since last office visit.  Patient unable to schedule an appointment due to insurance issues.  Refill for one month, or I can give her [facility] information.  NILAY Alejandra RN

## 2018-03-21 NOTE — TELEPHONE ENCOUNTER
Hydroxyzine given x 1 month  OCP given x 1 month.    Please give her [facility] for [facility] RR

## 2018-06-05 ENCOUNTER — OFFICE VISIT (OUTPATIENT)
Dept: PEDIATRICS | Facility: CLINIC | Age: 28
End: 2018-06-05
Payer: COMMERCIAL

## 2018-06-05 VITALS
DIASTOLIC BLOOD PRESSURE: 80 MMHG | WEIGHT: 183.9 LBS | HEIGHT: 62 IN | SYSTOLIC BLOOD PRESSURE: 118 MMHG | HEART RATE: 98 BPM | TEMPERATURE: 97.6 F | BODY MASS INDEX: 33.84 KG/M2 | OXYGEN SATURATION: 99 %

## 2018-06-05 DIAGNOSIS — Z30.9 ENCOUNTER FOR CONTRACEPTIVE MANAGEMENT, UNSPECIFIED TYPE: ICD-10-CM

## 2018-06-05 DIAGNOSIS — R21 RASH: Primary | ICD-10-CM

## 2018-06-05 DIAGNOSIS — K75.9 HEPATITIS: ICD-10-CM

## 2018-06-05 PROCEDURE — 99214 OFFICE O/P EST MOD 30 MIN: CPT | Performed by: NURSE PRACTITIONER

## 2018-06-05 PROCEDURE — 36415 COLL VENOUS BLD VENIPUNCTURE: CPT | Performed by: NURSE PRACTITIONER

## 2018-06-05 PROCEDURE — 80076 HEPATIC FUNCTION PANEL: CPT | Performed by: NURSE PRACTITIONER

## 2018-06-05 NOTE — MR AVS SNAPSHOT
After Visit Summary   6/5/2018    Alba Lee    MRN: [id number]           Patient Information     Date Of Birth          1990        Visit Information        Provider Department      6/5/2018 11:40 AM Marlene Coto APRN CNP [facility] [place]        Today's Diagnoses     Hepatitis    -  1    Rash        Encounter for contraceptive management, unspecified type           Follow-ups after your visit        Additional Services     DERMATOLOGY REFERRAL       Your provider has referred you to: FMG: [facility] [place] [phone]  FHN: [facility] [place] [phone]   [email or web address]  FHN: [facility] [place] [phone]   [email or web address]    Please be aware that coverage of these services is subject to the terms and limitations of your health insurance plan.  Call member services at your health plan with any benefit or coverage questions.      Please bring the following with you to your appointment:    (1) Any X-Rays, CTs or MRIs which have been performed.  Contact the facility where they were done to arrange for  prior to your scheduled appointment.    (2) List of current medications  (3) This referral request   (4) Any documents/labs given to you for this referral                  Who to contact     If you have questions or need follow up information about today's clinic visit or your schedule please contact [facility] directly at [phone].  Normal or non-critical lab and imaging results will be communicated to you by MyChart, letter or phone within 4 business days after the clinic has received the results. If you do not hear from us within 7 days, please contact the clinic through MyChart or phone. If you have a critical or abnormal lab result, we will notify you by phone as soon as possible.  Submit refill requests through [email or web address] or call your pharmacy and they  "will forward the refill request to us. Please allow 3 business days for your refill to be completed.          Additional Information About Your Visit        [facility] Information     [email or web address] gives you secure access to your electronic health record. If you see a primary care provider, you can also send messages to your care team and make appointments. If you have questions, please call your primary care clinic.  If you do not have a primary care provider, please call [phone] and they will assist you.        Care EveryWhere ID     This is your Care EveryWhere ID. This could be used by other organizations to access your [facility] medical records  [id number]        Your Vitals Were     Pulse Temperature Height Pulse Oximetry BMI (Body Mass Index)       98 97.6  F (36.4  C) (Tympanic) 5' 2\" (1.575 m) 99% 33.64 kg/m2        Blood Pressure from Last 3 Encounters:   06/05/18 118/80   02/14/17 102/64   02/08/17 130/80    Weight from Last 3 Encounters:   06/05/18 183 lb 14.4 oz (83.4 kg)   02/14/17 184 lb 3.2 oz (83.6 kg)   02/06/17 182 lb 1.6 oz (82.6 kg)              We Performed the Following     DERMATOLOGY REFERRAL     Hepatic panel (Albumin, ALT, AST, Bili, Alk Phos, TP)          Today's Medication Changes          These changes are accurate as of 6/5/18 12:07 PM.  If you have any questions, ask your nurse or doctor.               Stop taking these medicines if you haven't already. Please contact your care team if you have questions.     venlafaxine 75 MG 24 hr capsule   Commonly known as:  EFFEXOR-XR   Stopped by:  Marlene Coto APRN CNP                    Primary Care Provider Fax #    Physician No Ref-Primary [phone]       No address on file        Equal Access to Services     MATT COOPER : Yon Ruiz, giovanny hoffmann, agustina manriquez, haydee mariscal. So [facility] [phone].    ATENCIÓN: Si habla español, tiene a jarquin disposición servicios " andreas de asistencia lingüística. Yfn fernandes [phone].    We comply with applicable federal civil rights laws and [place] laws. We do not discriminate on the basis of race, color, national origin, age, disability, sex, sexual orientation, or gender identity.            Thank you!     Thank you for choosing [facility] [place]  for your care. Our goal is always to provide you with excellent care. Hearing back from our patients is one way we can continue to improve our services. Please take a few minutes to complete the written survey that you may receive in the mail after your visit with us. Thank you!             Your Updated Medication List - Protect others around you: Learn how to safely use, store and throw away your medicines at www.disposemymeds.org.          This list is accurate as of 6/5/18 12:07 PM.  Always use your most recent med list.                   Brand Name Dispense Instructions for use Diagnosis    desogestrel-ethinyl estradiol 0.15-30 MG-MCG per tablet    RECLIPSEN    84 tablet    Take 1 tablet by mouth daily    Encounter for contraceptive management, unspecified type       hydrOXYzine 25 MG tablet    ATARAX    60 tablet    Take 1-2 tablets (25-50 mg) by mouth every 6 hours as needed for itching    Itching       ondansetron 4 MG tablet    ZOFRAN     Take by mouth every 6 hours as needed for nausea Reported on 2/14/2017

## 2018-06-05 NOTE — PROGRESS NOTES
"  SUBJECTIVE:   Alba Lee is a 27 year old female who presents to clinic today for the following health issues:    Medication Followup of Hydroxyzine    Taking Medication as prescribed: NO-stopped    Side Effects:  Yes - getting rash on face    Medication Helping Symptoms:  No - was taking for derm problem     Patient wants dermatology referral.  Having issue with outer labia - itching and rash for long time.  Is also having flaking and itch in right ear    Rash      Duration: a long time    Description  Location: labia  Itching: moderate to severe    Intensity:  moderate    Accompanying signs and symptoms: irritation, possibly from itching    History (similar episodes/previous evaluation): family history of psoriasis    Precipitating or alleviating factors:  New exposures:  None  Recent travel: no      Therapies tried and outcome: Atarax/hydroxyzine -  not effective, benadryl      ROS: const/derm/gi/gyn otherwise negative     OBJECTIVE:  /80 (BP Location: Right arm, Cuff Size: Adult Regular)  Pulse 98  Temp 97.6  F (36.4  C) (Tympanic)  Ht 5' 2\" (1.575 m)  Wt 183 lb 14.4 oz (83.4 kg)  SpO2 99%  BMI 33.64 kg/m2  CONSTITUTIONAL: Alert, well-nourished, well-groomed, NAD  RESP: Lungs CTA. No wheeze, rhonchi, rales.  CV: HRRR S1 S2 No MRG. No peripheral edema  DERM: Well demarcated patch/plaque on her right labia majora, about 3.5 cm x 1.5cm, pink in color, slightly raised.     ASSESSMENT/PLAN:  (R21) Rash  (primary encounter diagnosis)  Comment: Patient with several skin complaints today. Her main concern is a well demarcated patch/plaque on her right labia majora that is extremely itchy. Today this patch is slightly erythematous but she states sometimes it is hypopigmented. She has had this rash for over 10 years, has tried several creams (doesn't know which types), but has never had much relief. She also has some flaking from right inside her right ear canal, which is not appreciated on exam " today. Her mother does have a history of psoriasis, but the patient has never had any psoriasis symptoms herself. DDX includes eczema, psoriasis, lichen sclerosis, or lichen simplex chronicus.   Plan: DERMATOLOGY REFERRAL          -Got patient in with derm 7/10. States she would prefer not to try any creams until getting a formal diagnosis.   -Previously broke out (papules on face) from hydroxyzine, which was prescribed for itching. She tried it on multiple occasions with similar results. Added to allergy list. Benadryl does not cause this reaction so will have her continue benadryl at night. She can try cetirizine during the day to see if this helps with itching as well.     (Z30.9) Encounter for contraceptive management, unspecified type  Comment: Currently tolerating OCP. No contraindications.   Plan: Ok to fill OCP until 1 year from today.     (K75.9) Hepatitis  Comment: History of hepatitis of unknown etiology. See previous ED notes and ED f/u note.  She has no abdominal pain, nausea, or stool changes.   Plan: Hepatic panel (Albumin, ALT, AST, Bili, Alk         Phos, TP)        Recheck LFTs today.       Marlene Coto, SHARONDA-DNP.

## 2018-06-06 LAB
ALBUMIN SERPL-MCNC: 4 G/DL (ref 3.4–5)
ALP SERPL-CCNC: 86 U/L (ref 40–150)
ALT SERPL W P-5'-P-CCNC: 29 U/L (ref 0–50)
AST SERPL W P-5'-P-CCNC: 18 U/L (ref 0–45)
BILIRUB DIRECT SERPL-MCNC: 0.1 MG/DL (ref 0–0.2)
BILIRUB SERPL-MCNC: 0.4 MG/DL (ref 0.2–1.3)
PROT SERPL-MCNC: 7.8 G/DL (ref 6.8–8.8)

## 2018-07-10 ENCOUNTER — OFFICE VISIT (OUTPATIENT)
Dept: DERMATOLOGY | Facility: CLINIC | Age: 28
End: 2018-07-10
Payer: COMMERCIAL

## 2018-07-10 DIAGNOSIS — L30.9 DERMATITIS: Primary | ICD-10-CM

## 2018-07-10 DIAGNOSIS — L29.9 PRURITUS: ICD-10-CM

## 2018-07-10 PROCEDURE — 99203 OFFICE O/P NEW LOW 30 MIN: CPT | Performed by: DERMATOLOGY

## 2018-07-10 RX ORDER — MOMETASONE FUROATE 1 MG/G
OINTMENT TOPICAL
Qty: 45 G | Refills: 1 | Status: SHIPPED | OUTPATIENT
Start: 2018-07-10 | End: 2020-04-21

## 2018-07-10 NOTE — LETTER
7/10/2018      RE: Alba Lee  [address]  [place] [address]       Dermatology Clinic Note    Dermatology Problem List:  1. 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 Seb dermatitis in R helix      CC: Patient presents with:  Consult: NP ref by Marlene Coto. Rash started 10 years ago sx rash is not getting any better, crotch area, severe itching, tx Aquaphor and Vitamin E oil, right ear internal and external 2 months back starrted peeling  tx none     HPI: Alba Lee is a 27 year old female presenting for initial evaluation of rash in genital area present x10 years. Past treatment with clotrimazole without benefit. Uses Vitamin E and Aquaphor daily. No wet wipes, condoms, lubricant, other topicals. Seen at the request of Marlene Coto.       Patient Active Problem List   Diagnosis     Hepatitis     Depression with anxiety       Allergies   Allergen Reactions     Penicillins Hives     Hydroxyzine Rash     No rash with benadryl         Current Outpatient Prescriptions   Medication     desogestrel-ethinyl estradiol (RECLIPSEN) 0.15-30 MG-MCG per tablet     mometasone (ELOCON) 0.1 % ointment     hydrOXYzine (ATARAX) 25 MG tablet     ondansetron (ZOFRAN) 4 MG tablet     No current facility-administered medications for this visit.        Family History   Problem Relation Age of Onset     Psoriasis Mother      Other Cancer Father      melanoma     Psoriasis Maternal Grandfather      Psoriasis Maternal Aunt      Breast Cancer No family hx of        Social History     Social History     Marital status: Single     Spouse name: N/A     Number of children: N/A     Years of education: N/A     Occupational History     Not on file.     Social History Main Topics     Smoking status: Current Some Day Smoker     Smokeless tobacco: Never Used     Alcohol use Yes      Comment: social (binge drinks), blackouts; no sz or DTs when stops     Drug use: Yes     Special: Marijuana      Comment: first  time last night     Sexual activity: Yes     Birth control/ protection: OCP, Pill     Other Topics Concern     Not on file     Social History Narrative         ROS: Feeling well without other skin concerns.     EXAM:  There were no vitals taken for this visit.  GEN: Alert, no distress  HEENT: Conjunctiva clear.   PULM: Breathing comfortably on RA  CV: Extrem warm and well perfused  ABD: No distension  SKIN: Exam of the genitals  --Lichenification and hypopigmentation of the bilateral labia majora, perineum. R >L  --No atrophy or purpura  --Greasy scale in the R conchal bowl      Assessment and Plan:    1. Dermatitis:  Lichen simplex chronicus of the vulva. I noted that the initial trigger is unclear. Differential diagnosis would include atopic dermatitis, allergic or irritant contact dermatitis. At this time I recommended treating the dermatitis and avoiding all other topicals except Vaseline or Aquaphor. If rash continues to recur would consider patch testing for contact allergens.   - mometasone (ELOCON) 0.1 % ointment; Twice daily to genital area until clear for up to 4 weeks.  Dispense: 45 g; Refill: 1    RTC 1 month.       Thank you for involving me in this patient's care.     Torri Betancur MD  Dermatology Staff    CC: .    Physician No Ref-Primary                Torri Betancur MD

## 2018-07-10 NOTE — PROGRESS NOTES
Dermatology Clinic Note    Dermatology Problem List:  1. 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 Seb dermatitis in R helix      CC: Patient presents with:  Consult: NP ref by Marlene Coto. Rash started 10 years ago sx rash is not getting any better, crotch area, severe itching, tx Aquaphor and Vitamin E oil, right ear internal and external 2 months back starrted peeling  tx none     HPI: Alba Lee is a 27 year old female presenting for initial evaluation of rash in genital area present x10 years. Past treatment with clotrimazole without benefit. Uses Vitamin E and Aquaphor daily. No wet wipes, condoms, lubricant, other topicals. Seen at the request of Marlene Coto.       Patient Active Problem List   Diagnosis     Hepatitis     Depression with anxiety       Allergies   Allergen Reactions     Penicillins Hives     Hydroxyzine Rash     No rash with benadryl         Current Outpatient Prescriptions   Medication     desogestrel-ethinyl estradiol (RECLIPSEN) 0.15-30 MG-MCG per tablet     mometasone (ELOCON) 0.1 % ointment     hydrOXYzine (ATARAX) 25 MG tablet     ondansetron (ZOFRAN) 4 MG tablet     No current facility-administered medications for this visit.        Family History   Problem Relation Age of Onset     Psoriasis Mother      Other Cancer Father      melanoma     Psoriasis Maternal Grandfather      Psoriasis Maternal Aunt      Breast Cancer No family hx of        Social History     Social History     Marital status: Single     Spouse name: N/A     Number of children: N/A     Years of education: N/A     Occupational History     Not on file.     Social History Main Topics     Smoking status: Current Some Day Smoker     Smokeless tobacco: Never Used     Alcohol use Yes      Comment: social (binge drinks), blackouts; no sz or DTs when stops     Drug use: Yes     Special: Marijuana      Comment: first time last night     Sexual activity: Yes     Birth control/ protection: OCP, Pill     Other Topics  Concern     Not on file     Social History Narrative         ROS: Feeling well without other skin concerns.     EXAM:  There were no vitals taken for this visit.  GEN: Alert, no distress  HEENT: Conjunctiva clear.   PULM: Breathing comfortably on RA  CV: Extrem warm and well perfused  ABD: No distension  SKIN: Exam of the genitals  --Lichenification and hypopigmentation of the bilateral labia majora, perineum. R >L  --No atrophy or purpura  --Greasy scale in the R conchal bowl      Assessment and Plan:    1. Dermatitis:  Lichen simplex chronicus of the vulva. I noted that the initial trigger is unclear. Differential diagnosis would include atopic dermatitis, allergic or irritant contact dermatitis. At this time I recommended treating the dermatitis and avoiding all other topicals except Vaseline or Aquaphor. If rash continues to recur would consider patch testing for contact allergens.   - mometasone (ELOCON) 0.1 % ointment; Twice daily to genital area until clear for up to 4 weeks.  Dispense: 45 g; Refill: 1    RTC 1 month.       Thank you for involving me in this patient's care.     Torri Betancur MD  Dermatology Staff    CC: .    Physician No Ref-Primary

## 2018-07-10 NOTE — LETTER
Date:July 11, 2018      Patient was self referred, no letter generated. Do not send.        [facility] Health Information

## 2018-07-27 ENCOUNTER — OFFICE VISIT (OUTPATIENT)
Dept: PEDIATRICS | Facility: CLINIC | Age: 28
End: 2018-07-27
Payer: COMMERCIAL

## 2018-07-27 ENCOUNTER — TELEPHONE (OUTPATIENT)
Dept: PEDIATRICS | Facility: CLINIC | Age: 28
End: 2018-07-27

## 2018-07-27 VITALS
OXYGEN SATURATION: 98 % | WEIGHT: 182.9 LBS | TEMPERATURE: 98.3 F | HEIGHT: 62 IN | HEART RATE: 93 BPM | DIASTOLIC BLOOD PRESSURE: 70 MMHG | BODY MASS INDEX: 33.66 KG/M2 | SYSTOLIC BLOOD PRESSURE: 110 MMHG

## 2018-07-27 DIAGNOSIS — J34.89 INTERNAL NASAL LESION: Primary | ICD-10-CM

## 2018-07-27 PROCEDURE — 99213 OFFICE O/P EST LOW 20 MIN: CPT | Performed by: PHYSICIAN ASSISTANT

## 2018-07-27 RX ORDER — MUPIROCIN CALCIUM 20 MG/G
CREAM TOPICAL 3 TIMES DAILY
Qty: 15 G | Refills: 0 | Status: SHIPPED | OUTPATIENT
Start: 2018-07-27 | End: 2019-04-29

## 2018-07-27 RX ORDER — MUPIROCIN 20 MG/G
OINTMENT TOPICAL
Qty: 15 G | Refills: 0 | Status: SHIPPED | OUTPATIENT
Start: 2018-07-27 | End: 2019-04-29

## 2018-07-27 RX ORDER — CEPHALEXIN 500 MG/1
500 CAPSULE ORAL 3 TIMES DAILY
Qty: 21 CAPSULE | Refills: 0 | Status: SHIPPED | OUTPATIENT
Start: 2018-07-27 | End: 2019-04-29

## 2018-07-27 NOTE — PATIENT INSTRUCTIONS
Begin ointment three times daily x 5 days  Begin antibiotics as directed--with food    Ice or cool compresses

## 2018-07-27 NOTE — TELEPHONE ENCOUNTER
I called and relayed the providers message and she has no further questions at this time.   Fabi Lazaro on 7/27/2018 at 2:53 PM

## 2018-07-27 NOTE — TELEPHONE ENCOUNTER
Reason for Call:  Other prescription    Detailed comments: mupirocin (BACTROBAN) 2 % ointment  Pt is calling back. When she went to  the above medication her insurance does not pay for it. Cost would be over 200. She needs a different medication prescribed.   Pt is going to start the oral medication.    Phone Number Patient can be reached at: Home number on file [phone] (home)    Best Time: any    Can we leave a detailed message on this number? YES    Call taken on 7/27/2018 at 1:54 PM by Clarisa Combs

## 2018-07-27 NOTE — TELEPHONE ENCOUNTER
Call patient. Pharmacy should have called for alternative medication.   The cream has been prescribed instead of ointment.   Sorry for the inconvenience.   Thelma Hardwick PA-C

## 2018-07-27 NOTE — MR AVS SNAPSHOT
After Visit Summary   7/27/2018    Alba Lee    MRN: [id number]           Patient Information     Date Of Birth          1990        Visit Information        Provider Department      7/27/2018 1:10 PM Thelma Hardwick PA-C [facility] [place]        Today's Diagnoses     Internal nasal lesion    -  1      Care Instructions    Begin ointment three times daily x 5 days  Begin antibiotics as directed--with food    Ice or cool compresses          Follow-ups after your visit        Your next 10 appointments already scheduled     Sep 11, 2018 10:00 AM CDT   Return Visit with Torri Betancur MD   [facility] [place] ([facility])    [address]  [address]  [facility] [address] [phone]              Who to contact     If you have questions or need follow up information about today's clinic visit or your schedule please contact [facility] directly at [phone].  Normal or non-critical lab and imaging results will be communicated to you by TwtBkshart, letter or phone within 4 business days after the clinic has received the results. If you do not hear from us within 7 days, please contact the clinic through TwtBkshart or phone. If you have a critical or abnormal lab result, we will notify you by phone as soon as possible.  Submit refill requests through [employer] or call your pharmacy and they will forward the refill request to us. Please allow 3 business days for your refill to be completed.          Additional Information About Your Visit        MyChart Information     [employer] gives you secure access to your electronic health record. If you see a primary care provider, you can also send messages to your care team and make appointments. If you have questions, please call your primary care clinic.  If you do not have a primary care provider, please call [phone] and they will assist you.        Care EveryWhere ID     This is  "your Care EveryWhere ID. This could be used by other organizations to access your [facility] medical records  [id number]        Your Vitals Were     Pulse Temperature Height Pulse Oximetry BMI (Body Mass Index)       93 98.3  F (36.8  C) (Oral) 5' 2\" (1.575 m) 98% 33.45 kg/m2        Blood Pressure from Last 3 Encounters:   07/27/18 110/70   06/05/18 118/80   02/14/17 102/64    Weight from Last 3 Encounters:   07/27/18 182 lb 14.4 oz (83 kg)   06/05/18 183 lb 14.4 oz (83.4 kg)   02/14/17 184 lb 3.2 oz (83.6 kg)              Today, you had the following     No orders found for display         Today's Medication Changes          These changes are accurate as of 7/27/18  1:23 PM.  If you have any questions, ask your nurse or doctor.               Start taking these medicines.        Dose/Directions    cephALEXin 500 MG capsule   Commonly known as:  KEFLEX   Used for:  Internal nasal lesion   Started by:  Thelma Hardwick PA-C        Dose:  500 mg   Take 1 capsule (500 mg) by mouth 3 times daily   Quantity:  21 capsule   Refills:  0       mupirocin 2 % ointment   Commonly known as:  BACTROBAN   Used for:  Internal nasal lesion   Started by:  Thelma Hardwick PA-C        Apply inside nose three times daily   Quantity:  15 g   Refills:  0            Where to get your medicines      These medications were sent to [facility] - [place], [place] - [address]  AT [address] & [address] [place] [address]     Phone:  [phone]     cephALEXin 500 MG capsule    mupirocin 2 % ointment                Primary Care Provider Fax #    Physician No Ref-Primary [phone]       No address on file        Equal Access to Services     ALBINO COOPER AH: Yon Ruiz, wayeseniada luqadaha, qaybta kaalmada declan, haydee mariscal. So [facility] [phone].    ATENCIÓN: Si mayra español, tiene a jarquin disposición servicios gratuitos de " asistencia lingüística. Yfn al [phone].    We comply with applicable federal civil rights laws and [place] laws. We do not discriminate on the basis of race, color, national origin, age, disability, sex, sexual orientation, or gender identity.            Thank you!     Thank you for choosing [facility] [place]  for your care. Our goal is always to provide you with excellent care. Hearing back from our patients is one way we can continue to improve our services. Please take a few minutes to complete the written survey that you may receive in the mail after your visit with us. Thank you!             Your Updated Medication List - Protect others around you: Learn how to safely use, store and throw away your medicines at www.disposemymeds.org.          This list is accurate as of 7/27/18  1:23 PM.  Always use your most recent med list.                   Brand Name Dispense Instructions for use Diagnosis    cephALEXin 500 MG capsule    KEFLEX    21 capsule    Take 1 capsule (500 mg) by mouth 3 times daily    Internal nasal lesion       desogestrel-ethinyl estradiol 0.15-30 MG-MCG per tablet    RECLIPSEN    84 tablet    Take 1 tablet by mouth daily    Encounter for contraceptive management, unspecified type       mometasone 0.1 % ointment    ELOCON    45 g    Twice daily to genital area until clear for up to 4 weeks.    Dermatitis       mupirocin 2 % ointment    BACTROBAN    15 g    Apply inside nose three times daily    Internal nasal lesion

## 2018-07-27 NOTE — PROGRESS NOTES
"  SUBJECTIVE:   Alba Lee is a 27 year old female who presents to clinic today for the following health issues:    Concern - Nose  Onset: 4 days ago    Description:   Left nostril    Intensity: mild, moderate    Progression of Symptoms:  worsening    Accompanying Signs & Symptoms:  Pain  Swelling of nose  No discharge  No URI symptoms or allergic symptoms   No fevers, chills, headache   No trauma or injury    Previous history of similar problem:   none    Precipitating factors:   Worsened by: touch    Alleviating factors:  Improved by: none  Therapies Tried and outcome: Ibuprofen    ROS:  ROS otherwise negative    OBJECTIVE:                                                    /70 (BP Location: Right arm, Cuff Size: Adult Regular)  Pulse 93  Temp 98.3  F (36.8  C) (Oral)  Ht 5' 2\" (1.575 m)  Wt 182 lb 14.4 oz (83 kg)  SpO2 98%  BMI 33.45 kg/m2  Body mass index is 33.45 kg/(m^2).   GENERAL: alert, no distress  HENT: ear canals- normal; TMs- normal; Mouth- no ulcers, no lesions; nose: swelling over the left mid aspect of nare; tender to palpation, no warmth or open lesions; internal nare reveals an erythemic open sore present. Tender.  NECK: no tenderness, no adenopathy  RESP: lungs clear to auscultation - no rales, no rhonchi, no wheezes  CV: regular rates and rhythm, normal S1 S2, no S3 or S4 and no murmur, no click or rub    Diagnostic test results:  No results found for this or any previous visit (from the past 24 hour(s)).     ASSESSMENT/PLAN:                                                    1. Internal nasal lesion  Begin ointment three times daily x 5 days, oral antibiotics and ice. Call if symptoms persist or worsen.  - mupirocin (BACTROBAN) 2 % ointment; Apply inside nose three times daily  Dispense: 15 g; Refill: 0  - cephALEXin (KEFLEX) 500 MG capsule; Take 1 capsule (500 mg) by mouth 3 times daily  Dispense: 21 capsule; Refill: 0    Thelma Hardwick PA-C  [facility] " [place]

## 2018-08-02 ENCOUNTER — TELEPHONE (OUTPATIENT)
Dept: PEDIATRICS | Facility: CLINIC | Age: 28
End: 2018-08-02

## 2018-08-02 NOTE — TELEPHONE ENCOUNTER
PA Initiation    Medication: mupirocin 2% cream 15 g  Insurance Company: [employer] - Phone [phone] Fax [phone]  Pharmacy Filling the Rx: [facility] [id number] [place], [place] [address] [facility]  AT [address] [facility] & [address] [facility]  Filling Pharmacy Phone: [phone]  Filling Pharmacy Fax:    Start Date: 8/2/2018    Central Prior Authorization Team   Phone: [phone]

## 2018-08-02 NOTE — TELEPHONE ENCOUNTER
Prior Authorization Retail Medication Request    Medication/Dose: mupirocin 2% cream 15 g  ICD code (if different than what is on RX):    Previously Tried and Failed:  Mupirocin ointment  Rationale:  Patient requested cheaper alternative    Insurance Name:  [employer] PTY  Insurance ID:  [id number]      Pharmacy Information (if different than what is on RX)  Name:    Phone:    Karyn Crowley CMA

## 2018-08-03 NOTE — TELEPHONE ENCOUNTER
Rep Nallely from [employer] Prior auth dept called stating that the Mupirocin Calcium 2% cream has been approved from the dates of 7/3/18-8/3/19.  Case ID# [id number].  Her phone #[phone].  She will inform pt of approval.    Ginette Resendiz RN

## 2019-04-29 ENCOUNTER — OFFICE VISIT (OUTPATIENT)
Dept: PEDIATRICS | Facility: CLINIC | Age: 29
End: 2019-04-29
Payer: COMMERCIAL

## 2019-04-29 VITALS
TEMPERATURE: 98.1 F | HEART RATE: 86 BPM | SYSTOLIC BLOOD PRESSURE: 106 MMHG | HEIGHT: 62 IN | OXYGEN SATURATION: 97 % | BODY MASS INDEX: 33.05 KG/M2 | WEIGHT: 179.6 LBS | DIASTOLIC BLOOD PRESSURE: 68 MMHG

## 2019-04-29 DIAGNOSIS — L21.9 SEBORRHEIC DERMATITIS: ICD-10-CM

## 2019-04-29 DIAGNOSIS — Z30.9 ENCOUNTER FOR CONTRACEPTIVE MANAGEMENT, UNSPECIFIED TYPE: ICD-10-CM

## 2019-04-29 DIAGNOSIS — K14.8 TONGUE LESION: Primary | ICD-10-CM

## 2019-04-29 DIAGNOSIS — M79.671 RIGHT FOOT PAIN: ICD-10-CM

## 2019-04-29 PROCEDURE — 99214 OFFICE O/P EST MOD 30 MIN: CPT | Performed by: NURSE PRACTITIONER

## 2019-04-29 RX ORDER — DESOGESTREL AND ETHINYL ESTRADIOL 0.15-0.03
1 KIT ORAL DAILY
Qty: 84 TABLET | Refills: 3 | Status: SHIPPED | OUTPATIENT
Start: 2019-04-29 | End: 2020-11-18

## 2019-04-29 ASSESSMENT — MIFFLIN-ST. JEOR: SCORE: 1492.91

## 2019-04-29 NOTE — PROGRESS NOTES
"  SUBJECTIVE:   Alba Lee is a 28 year old female who presents to clinic today for the following health issues:    Would like ear check today.    Musculoskeletal problem/pain      Duration: months ago - was lifting heavy pot and was bruised in beginning, now has lump with occasional pain    Description  Location: middle finger right hand    Intensity:  mild    Accompanying signs and symptoms: sometimes difficulties using hand    History  Previous similar problem: no   Previous evaluation:  none    Precipitating or alleviating factors:  Trauma or overuse: YES- lifting heavy pain  Aggravating factors include: hard gripping    Therapies tried and outcome: nothing    Musculoskeletal problem/pain      Duration: since last summer    Description  Location: right foot big toe    Intensity:  mild    Accompanying signs and symptoms: dull pain  - intermittent    History  Previous similar problem: no   Previous evaluation:  none    Precipitating or alleviating factors:  Trauma or overuse: YES- stepped on glass and cut foot last June 2018  Aggravating factors include: prolonged standing    Therapies tried and outcome: sometimes ibuprofen/tylenol    Tongue pain      Duration: since last summer    Description (location/character/radiation): right side of tongue feels like a patch of dry skin then became canker sore, - now intermittent canker sores on tongue, dry mouth    Intensity:  Moderate to severe - intermittent    Accompanying signs and symptoms: gets lesion on side of tongue - right    History (similar episodes/previous evaluation): history of canker sores not on tongue    Precipitating or alleviating factors: smoking    Therapies tried and outcome: listerine, dry mouth rinse       ROS: con otherwise negative     OBJECTIVE:  /68 (BP Location: Right arm, Cuff Size: Adult Large)   Pulse 86   Temp 98.1  F (36.7  C) (Tympanic)   Ht 1.575 m (5' 2\")   Wt 81.5 kg (179 lb 9.6 oz)   SpO2 97%   BMI 32.85 kg/m  "   CONSTITUTIONAL: Alert, well-nourished, well-groomed, NAD  RESP: Lungs CTA. No wheeze, rhonchi, rales.  CV: HRRR S1 S2 No MRG. No peripheral edema  DERM: Scaly skin R helix  MSK: NO apparent retained foreign body foot. No deformity.   HEENT: Tongue with white lesion right tongue.     ASSESSMENT/PLAN:  (K14.8) Tongue lesion  (primary encounter diagnosis)  Comment: Reviewed differentials and need to see ENT asap  Plan: OTOLARYNGOLOGY REFERRAL            (M79.551) Right foot pain  Comment: Possible retained glass.  Plan: ORTHO  REFERRAL            (Z30.9) Encounter for contraceptive management, unspecified type  Comment: No contraindications. Smoker but under 35. Reviewed risks.  Plan: desogestrel-ethinyl estradiol (RECLIPSEN)         0.15-30 MG-MCG tablet            (Z68.32) BMI 32.0-32.9,adult  Plan: BARIATRIC ADULT REFERRAL            (L21.9) Seborrheic dermatitis  Comment: Per derm.   Plan:   -Re-start steroid cream BID x 2 weeks. If no improvement see derm. Use no more than 2 weeks      Marlene Coto, DEANNAP-DNP.

## 2019-04-29 NOTE — PATIENT INSTRUCTIONS
1. Tongue: See ENT  2. Ear: Seborrheic Dermatitis (see below). Apply steroid cream twice a day for 2 weeks. See derm if not getting better.   3. Finger. Probably partially snapped a ligament. I recommend purchasing thin coban   4. Please consider seeing podiatry. Call our clinic. You can also check out [facility] in [place] and see an orthodist. But first, try [facility].       Patient Education     Understanding Seborrheic Dermatitis    Seborrheic dermatitis is a common type of rash that causes red, scaly, greasy skin. It occurs on skin that has oil glands, such as the face, scalp, and upper chest. It tends to last a long time, or go away and come back. Seborrheic dermatitis is not spread from person to person.  How to say it  [id number]   What causes seborrheic dermatitis?  The cause is not yet known. It may be partly caused by a type of yeast that grows on skin, along with excess oil production. Experts are still learning more. It s more common in men than women, and it can occur at any age. It happens more often in people with HIV/AIDS, Parkinson disease, alcoholic pancreatitis, hepatitis, or cancer. It can also get worse during times of stress.  Symptoms of seborrheic dermatitis  Symptoms can include skin that is:    Bumpy    Covered with yellow scales or crusts    Cracked    Greasy    Itchy    Leaking fluid    Painful    Red or orange  These symptoms can occur on skin:    Around the nose    Behind the ears    In the beard    In the eyebrows    On the scalp, also known as dandruff    On the upper chest  You may also have acne, inflamed eyelids (blepharitis), or other skin conditions at the same time.  Treatment for seborrheic dermatitis  Treatment can reduce symptoms for a period of time. The types of treatments most often used include:    Antifungal shampoo, body wash, or cream. These contain medicines such as ketoconazole, fluconazole, selenium sulfide, ciclopirox, or tea tree  oil.    Corticosteroid cream or ointment. These containmedicines such ashydrocortisone or fluocinolone acetonide.    Calcineurin inhibitorcream or ointment. These contain medicines such as pimecrolimus or tacrolimus.    Shampoo or cream with other medicines. These contain medicines such as coal tar, salicylic acid, or zinc pyrithione.    Sodium sulfacetemide creams and washes. These may also help.  Wash your skin gently. You can remove scales with oil and gentle rubbing or a brush.  Living with seborrheic dermatitis  Seborrheic dermatitis is an ongoing (chronic) condition. It can go away and then come back. You will likely need to use shampoo, cream, or ointment with medicine once or twice a week. This can help to keep symptoms from coming back or getting worse.  When to call your healthcare provider  Call your healthcare provider right away if you have any of these:    Symptoms that don t get better, or get worse    New symptoms   Date Last Reviewed: 5/1/2016 2000-2018 [employer]. [address] [place] [address], [place] [address]. All rights reserved. This information is not intended as a substitute for professional medical care. Always follow your healthcare professional's instructions.

## 2019-05-29 ENCOUNTER — MYC MEDICAL ADVICE (OUTPATIENT)
Dept: PEDIATRICS | Facility: CLINIC | Age: 29
End: 2019-05-29

## 2019-09-13 ENCOUNTER — TRANSFERRED RECORDS (OUTPATIENT)
Dept: HEALTH INFORMATION MANAGEMENT | Facility: CLINIC | Age: 29
End: 2019-09-13

## 2019-09-14 ENCOUNTER — OFFICE VISIT (OUTPATIENT)
Dept: URGENT CARE | Facility: URGENT CARE | Age: 29
End: 2019-09-14
Payer: COMMERCIAL

## 2019-09-14 VITALS
WEIGHT: 174.8 LBS | SYSTOLIC BLOOD PRESSURE: 106 MMHG | TEMPERATURE: 98.2 F | BODY MASS INDEX: 31.97 KG/M2 | DIASTOLIC BLOOD PRESSURE: 70 MMHG | OXYGEN SATURATION: 99 % | HEART RATE: 93 BPM

## 2019-09-14 DIAGNOSIS — K14.6 TONGUE PAIN: Primary | ICD-10-CM

## 2019-09-14 PROCEDURE — 99213 OFFICE O/P EST LOW 20 MIN: CPT | Performed by: FAMILY MEDICINE
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
## 2019-09-14 NOTE — PATIENT INSTRUCTIONS
Continue placing ice onto the painful area of the right side of the tongue.      Continue Ibuprofen for additional pain relief.      follow up with the ear nose throat specialist regarding the tongue lesion.

## 2019-09-14 NOTE — LETTER
[facility]  [address]  [address]  [place] [address] [phone]      September 14, 2019    RE:  Alba Lee                                                                                                                                                       [address]   [place] [address]            To whom it may concern:    Alba Lee is under my professional care at the [facility] on September 14, 2019.  Because of her severe tongue pain, please excuse her absences from work on September 14 and September 15, 2019.  She  may return to work once she starts to feel better.         Sincerely,        Juan Gordon MD, MD    [facility]

## 2019-09-30 ENCOUNTER — HEALTH MAINTENANCE LETTER (OUTPATIENT)
Age: 29
End: 2019-09-30

## 2019-09-30 ENCOUNTER — TRANSFERRED RECORDS (OUTPATIENT)
Dept: HEALTH INFORMATION MANAGEMENT | Facility: CLINIC | Age: 29
End: 2019-09-30

## 2020-02-03 ENCOUNTER — TELEPHONE (OUTPATIENT)
Dept: PEDIATRICS | Facility: CLINIC | Age: 30
End: 2020-02-03

## 2020-02-03 NOTE — LETTER
February 7, 2020      Alba Lee  [address]   [place] [address]        Dear Alba,       We care about your health and have reviewed your health plan including your medical conditions, medications, and lab results.  Based on this review, it is recommended that you follow up regarding the following health topic(s):  -Cervical Cancer Screening  -Wellness (Physical) Visit     We recommend you take the following action(s):  -schedule a WELLNESS (Physical) APPOINTMENT.  We will perform the following labs: pap smear.     Please call us at the [facility] - [phone] (or use [employer]) to address the above recommendations.     Thank you for trusting [facility] and we appreciate the opportunity to serve you.  We look forward to supporting your healthcare needs in the future.    Healthy Regards,    Your Health Care Team  [facility]

## 2020-02-03 NOTE — TELEPHONE ENCOUNTER
Panel Management Review      Patient has the following on her problem list:   Depression / Dysthymia review    Measure:  Needs PHQ-9 score of 4 or less during index window.  Administer PHQ-9 and if score is 5 or more, send encounter to provider for next steps.    5 - 7 month window range: last seen 4/29/19    No flowsheet data found.    If PHQ-9 recheck is 5 or more, route to provider for next steps.    Patient is due for:  None  - NOT in HM  Composite cancer screening  Chart review shows that this patient is due/due soon for the following Pap Smear  Summary:    Patient is due/failing the following:   PAP and PHYSICAL    Action needed:   Patient needs office visit for physical w/ pap. and Patient needs to do PHQ9.?    Type of outreach:    Sent [employer] message.    Questions for provider review:    Patient has depression/anxiety on problem list but is not in HM?                                                                                                                       Karyn Crowley CMA    Chart routed to Care Team.

## 2020-02-13 NOTE — TELEPHONE ENCOUNTER
Called and left VM for patient to contact clinic.  Patient needs physical w/ pap and dep/anx f/u.  Karyn Crowley, CMA

## 2020-03-15 ENCOUNTER — HEALTH MAINTENANCE LETTER (OUTPATIENT)
Age: 30
End: 2020-03-15

## 2020-04-20 ENCOUNTER — MYC MEDICAL ADVICE (OUTPATIENT)
Dept: PEDIATRICS | Facility: CLINIC | Age: 30
End: 2020-04-20

## 2020-04-21 ENCOUNTER — OFFICE VISIT (OUTPATIENT)
Dept: URGENT CARE | Facility: URGENT CARE | Age: 30
End: 2020-04-21
Payer: COMMERCIAL

## 2020-04-21 ENCOUNTER — ANCILLARY PROCEDURE (OUTPATIENT)
Dept: GENERAL RADIOLOGY | Facility: CLINIC | Age: 30
End: 2020-04-21
Attending: PHYSICIAN ASSISTANT
Payer: COMMERCIAL

## 2020-04-21 VITALS
DIASTOLIC BLOOD PRESSURE: 88 MMHG | HEART RATE: 87 BPM | OXYGEN SATURATION: 100 % | TEMPERATURE: 98.6 F | SYSTOLIC BLOOD PRESSURE: 138 MMHG

## 2020-04-21 DIAGNOSIS — M79.641 PAIN OF RIGHT HAND: ICD-10-CM

## 2020-04-21 DIAGNOSIS — M79.641 PAIN OF RIGHT HAND: Primary | ICD-10-CM

## 2020-04-21 PROCEDURE — 99214 OFFICE O/P EST MOD 30 MIN: CPT | Performed by: PHYSICIAN ASSISTANT

## 2020-04-21 PROCEDURE — 73130 X-RAY EXAM OF HAND: CPT | Mod: RT
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
## 2020-04-21 NOTE — TELEPHONE ENCOUNTER
This is a new problem so should be routed to triage to assess need to come in. Will route to triage. Will likely need ortho appointment vs seeing Patito as she has experience with fractures.

## 2020-04-21 NOTE — PATIENT INSTRUCTIONS
Patient Education     Hand Contusion  You have a contusion. This is also called a bruise. There is swelling and some bleeding under the skin, but no broken bones. This injury generally takes a few days to a few weeks to heal.  During that time, the bruise will typically change in color from reddish, to purple-blue, to greenish-yellow, then to yellow-brown.  Home care    Elevate the hand to reduce pain and swelling. As much as possible, sit or lie down with the hand raised about the level of your heart. This is especially important during the first 48 hours.    Ice the hand to help reduce pain and swelling. Wrap a cold source (ice pack or ice cubes in a plastic bag) in a thin towel. Apply to the bruised area for 20 minutes every 1 to 2 hours the first day. Continue this 3 to 4 times a day until the pain and swelling goes away.    Unless another medicine was prescribed, you can take acetaminophen, ibuprofen, or naproxen to control pain. (If you have chronic liver or kidney disease or ever had a stomach ulcer or gastrointestinal bleeding, talk with your doctor before using these medicines.)  Follow up  Follow up with your healthcare provider or our staff as advised. Call if you are not improving within 1 to 2 weeks.  When to seek medical advice   Call your healthcare provider right away if you have any of the following:    Increased pain or swelling    Arm becomes cold, blue, numb or tingly    Signs of infection: Warmth, drainage, or increased redness or pain around the bruise    Inability to move the injured hand     Frequent bruising for unknown reasons  Date Last Reviewed: 2/1/2017 2000-2019 [employer]. [address] [place] [address], [place] [address]. All rights reserved. This information is not intended as a substitute for professional medical care. Always follow your healthcare professional's instructions.

## 2020-05-19 ENCOUNTER — OFFICE VISIT (OUTPATIENT)
Dept: ORTHOPEDICS | Facility: CLINIC | Age: 30
End: 2020-05-19
Payer: COMMERCIAL

## 2020-05-19 VITALS
BODY MASS INDEX: 34.04 KG/M2 | SYSTOLIC BLOOD PRESSURE: 110 MMHG | DIASTOLIC BLOOD PRESSURE: 78 MMHG | WEIGHT: 185 LBS | HEIGHT: 62 IN

## 2020-05-19 DIAGNOSIS — T14.90XA INJURY: ICD-10-CM

## 2020-05-19 DIAGNOSIS — M79.641 RIGHT HAND PAIN: Primary | ICD-10-CM

## 2020-05-19 PROCEDURE — 99203 OFFICE O/P NEW LOW 30 MIN: CPT | Performed by: FAMILY MEDICINE

## 2020-05-19 ASSESSMENT — MIFFLIN-ST. JEOR: SCORE: 1512.4

## 2020-05-19 NOTE — LETTER
5/19/2020         RE: Alba Lee  [address]  [place] [address]        Dear Colleague,    Thank you for referring your patient, Alba Lee, to the [facility]. Please see a copy of my visit note below.    ASSESSMENT & PLAN    1. Right hand pain    2. Injury      Seen for concerns about swelling in her hand after punching a wall 2 months ago  Some mild swelling at 4th MCP. Reassurance provide dnad educated would take months/year to resolve.   Very benign exam and can't elicit any pain  Progress activity  Discontinue splinting  Reviewed previously obtained xray - no fracture    -----    SUBJECTIVE  Alba Lee is a/an 29 year old Right handed female who is seen as a self referral for evaluation of right hand pain. The patient is seen by themselves.    Onset: 2 month(s) ago. Patient describes injury as she punched a wall with a closed right fist.  Location of Pain: right ring finger - MCP joint (worst), right little finger - MCP joint  Rating of Pain at worst: 4/10  Rating of Pain Currently: 2/10  Worsened by: radial deviation of right 4th finger  Better with: splinting  Treatments tried: rest/activity avoidance, ibuprofen, ice, and previous imaging (xray 4/21/20), isabel tape, alumafoam splint  Associated symptoms: swelling, numbness / tingling radiating into radial aspect of wrist / forearm (had been present for at least 1 year)  Orthopedic history: NO  Relevant surgical history: NO  Patient Social History: works at [employer]    Patient's past medical, surgical, social, and family histories were reviewed today and no pertinent history related to patient's presenting problem.    REVIEW OF SYSTEMS:  10 point ROS is negative other than symptoms noted above in HPI, Past Medical History or as stated below  Constitutional: NEGATIVE for fever, chills, change in weight  Skin: NEGATIVE for worrisome rashes, moles or lesions  GI/: NEGATIVE for bowel or  "bladder changes  Neuro: NEGATIVE for weakness, dizziness or paresthesias    OBJECTIVE:  /78   Ht 1.575 m (5' 2\")   Wt 83.9 kg (185 lb)   LMP  (LMP Unknown)   BMI 33.84 kg/m     General: healthy, alert and in no distress  HEENT: no scleral icterus or conjunctival erythema  Skin: no suspicious lesions or rash. No jaundice.  CV: regular rhythm by palpation  Resp: normal respiratory effort without conversational dyspnea   Psych: normal mood and affect  Gait: normal steady gait with appropriate coordination and balance  Neuro: normal light touch sensory exam of the bilateral hands.    MSK:  RIGHT HAND  Inspection:    Mild swelling at 4th MCP  Palpation:   Completely nontender along 4th and 5th metacarpal and MCP.   Range of Motion:    Full nonpainful range of motion at MCP and IP joints. Able to generate a fist with no pain.   Strength:     full. Full strength with flexor and extensor testing.    Independent visualization of the below image:  Results for orders placed or performed in visit on 04/21/20   XR Hand Right G/E 3 Views    Narrative    HAND RIGHT THREE OR MORE VIEWS April 21, 2020 10:35 AM     HISTORY: One month old possible boxer's with persisting symptoms. Pain  of right hand.    COMPARISON: None.      FINDINGS: There is no acute fracture, malalignment, erosion or  significant joint space loss. Specifically no evidence for fifth  metacarpal fracture is seen.      Impression    IMPRESSION: No acute fracture or malalignment. No evidence for boxer's  fracture is identified.    MD Louise ORANTES DO [facility]        Again, thank you for allowing me to participate in the care of your patient.        Sincerely,        Louise Coronado,     "

## 2020-05-19 NOTE — PATIENT INSTRUCTIONS
1. Right hand pain    2. Injury      Some mild swelling noted which can take months / year to resolve  Otherwise no concerning findings on exam  Progress activity  Discontinue splinting

## 2020-05-19 NOTE — PROGRESS NOTES
"ASSESSMENT & PLAN    1. Right hand pain    2. Injury      Seen for concerns about swelling in her hand after punching a wall 2 months ago  Some mild swelling at 4th MCP. Reassurance provide dnad educated would take months/year to resolve.   Very benign exam and can't elicit any pain  Progress activity  Discontinue splinting  Reviewed previously obtained xray - no fracture    -----    SUBJECTIVE  Alba Lee is a/an 29 year old Right handed female who is seen as a self referral for evaluation of right hand pain. The patient is seen by themselves.    Onset: 2 month(s) ago. Patient describes injury as she punched a wall with a closed right fist.  Location of Pain: right ring finger - MCP joint (worst), right little finger - MCP joint  Rating of Pain at worst: 4/10  Rating of Pain Currently: 2/10  Worsened by: radial deviation of right 4th finger  Better with: splinting  Treatments tried: rest/activity avoidance, ibuprofen, ice, and previous imaging (xray 4/21/20), isabel tape, alumafoam splint  Associated symptoms: swelling, numbness / tingling radiating into radial aspect of wrist / forearm (had been present for at least 1 year)  Orthopedic history: NO  Relevant surgical history: NO  Patient Social History: works at [employer]    Patient's past medical, surgical, social, and family histories were reviewed today and no pertinent history related to patient's presenting problem.    REVIEW OF SYSTEMS:  10 point ROS is negative other than symptoms noted above in HPI, Past Medical History or as stated below  Constitutional: NEGATIVE for fever, chills, change in weight  Skin: NEGATIVE for worrisome rashes, moles or lesions  GI/: NEGATIVE for bowel or bladder changes  Neuro: NEGATIVE for weakness, dizziness or paresthesias    OBJECTIVE:  /78   Ht 1.575 m (5' 2\")   Wt 83.9 kg (185 lb)   LMP  (LMP Unknown)   BMI 33.84 kg/m     General: healthy, alert and in no distress  HEENT: no scleral icterus or conjunctival " erythema  Skin: no suspicious lesions or rash. No jaundice.  CV: regular rhythm by palpation  Resp: normal respiratory effort without conversational dyspnea   Psych: normal mood and affect  Gait: normal steady gait with appropriate coordination and balance  Neuro: normal light touch sensory exam of the bilateral hands.    MSK:  RIGHT HAND  Inspection:    Mild swelling at 4th MCP  Palpation:   Completely nontender along 4th and 5th metacarpal and MCP.   Range of Motion:    Full nonpainful range of motion at MCP and IP joints. Able to generate a fist with no pain.   Strength:     full. Full strength with flexor and extensor testing.    Independent visualization of the below image:  Results for orders placed or performed in visit on 04/21/20   XR Hand Right G/E 3 Views    Narrative    HAND RIGHT THREE OR MORE VIEWS April 21, 2020 10:35 AM     HISTORY: One month old possible boxer's with persisting symptoms. Pain  of right hand.    COMPARISON: None.      FINDINGS: There is no acute fracture, malalignment, erosion or  significant joint space loss. Specifically no evidence for fifth  metacarpal fracture is seen.      Impression    IMPRESSION: No acute fracture or malalignment. No evidence for boxer's  fracture is identified.    MD Louise ORANTES DO [facility]

## 2020-11-18 ENCOUNTER — MYC MEDICAL ADVICE (OUTPATIENT)
Dept: PEDIATRICS | Facility: CLINIC | Age: 30
End: 2020-11-18

## 2020-11-18 DIAGNOSIS — Z30.9 ENCOUNTER FOR CONTRACEPTIVE MANAGEMENT, UNSPECIFIED TYPE: ICD-10-CM

## 2020-11-18 RX ORDER — DESOGESTREL AND ETHINYL ESTRADIOL 0.15-0.03
1 KIT ORAL DAILY
Qty: 84 TABLET | Refills: 0 | Status: SHIPPED | OUTPATIENT
Start: 2020-11-18 | End: 2021-05-10

## 2020-11-18 NOTE — TELEPHONE ENCOUNTER
Will send in a 3 month supply once I know what pharmacy she wants it sent to.   Hasn't been seen in April 2019 so should schedule a visit to establish with a new primary. I understand concern about Covid but good to have a primary in case needs arise in the future. Can get pap done at that time as well

## 2020-12-30 ENCOUNTER — TELEPHONE (OUTPATIENT)
Dept: ENDOCRINOLOGY | Facility: CLINIC | Age: 30
End: 2020-12-30

## 2020-12-31 NOTE — TELEPHONE ENCOUNTER
REFERRAL INFORMATION:    Referring Provider:  Self Referral    Referring Clinic:  N/A    Reason for Visit/Diagnosis: New MWM, BMI 33       FUTURE VISIT INFORMATION:    Appointment Date: 1/5/2021    Appointment Time: 9 AM     NOTES RECORD STATUS  DETAILS   OFFICE NOTE from Referring Provider N/A    OFFICE NOTE from Other Specialists N/A    HOSPITAL DISCHARGE SUMMARY/ ED VISITS  N/A    OPERATIVE REPORT N/A    ENDOSCOPY (EGD)  N/A    PERTINENT LABS Internal/ Care Everywhere    PATHOLOGY REPORTS (RELATED) N/A    IMAGING (CT, MRI, US, XR)  N/A

## 2021-01-04 ENCOUNTER — TELEPHONE (OUTPATIENT)
Dept: ENDOCRINOLOGY | Facility: CLINIC | Age: 31
End: 2021-01-04

## 2021-01-04 NOTE — PROGRESS NOTES
"Alba Lee is a 30 year old female who is being evaluated via a billable video visit.      The patient has been notified of following:     \"This video visit will be conducted via a call between you and your physician/provider. We have found that certain health care needs can be provided without the need for an in-person physical exam.  This service lets us provide the care you need with a video conversation.  If a prescription is necessary we can send it directly to your pharmacy.  If lab work is needed we can place an order for that and you can then stop by our lab to have the test done at a later time.    Video visits are billed at different rates depending on your insurance coverage.  Please reach out to your insurance provider with any questions.    If during the course of the call the physician/provider feels a video visit is not appropriate, you will not be charged for this service.\"    Patient has given verbal consent for Video visit? Yes  How would you like to obtain your AVS? MyChart  Will anyone else be joining your video visit? No        Video-Visit Details    Type of service:  Video Visit    Video Start Time: 9:40 AM  Video End Time: 10:06 AM  Time spent with patient: 26 minutes    Originating Location (pt. Location): Home    Distant Location (provider location):  [facility] [place]     Platform used for Video Visit: [employer]    During this virtual visit the patient is located in [place], patient verifies this as the location during the entirety of this visit.     New Weight Management Nutrition Consultation    Alba Lee is a 30 year old female presents today for new weight management nutrition consultation.  Patient referred by Radha Alvarado NP on January 5, 2021.    Patient with Co-morbidities of obesity including:  Type II DM no  Renal Failure no  Sleep apnea no  Hypertension no   Dyslipidemia no  Joint pain no  Back pain no  GERD no " "    Anthropometrics:  Estimated body mass index is 33.29 kg/m  as calculated from the following:    Height as of an earlier encounter on 1/5/21: 1.575 m (5' 2\").    Weight as of an earlier encounter on 1/5/21: 82.6 kg (182 lb).    Medications for Weight Loss:  Topamax    NUTRITION HISTORY  See MD note for details.  NKFA/intolerrances.   Takes Garden of Life protein shake, MVI (melaluka).   Emotional eating  Eating rapidly  Craves cheese and chips. Drinking a lot of water.   Works as a cook, laid off since start of pandemic. When working, would often not eat until 4 pm.   Previous wt loss attempts: meal replacements (replaced one meal per day), keto for a while (did not find very beneficial).      Recent food recall:  Breakfast: skipping or egg sandwich (english muffin + eggs/cheese or potatoes and egg and veggie)  Lunch: protein shake; tacos; spaghetti; bag of Gardettos; sandwich - unplanned meals  Dinner: \"Usually a balanced meal but too late at night.\" Tacos; spaghetti; steak and blue cheese + Brussle sprouts + potato - right before bed  Snacks: Cheese, crackers, chips  Beverages: Water (a gallon per day), occ black tea  Alcohol: 2-4x/month, 3-4 drinks at a time  Dining out: Subway, Pasta a few times since moving in Oct.     Nutrition Prescription  Recommended energy/nutrient modification.  Plate Method/Volumetrics    Nutrition Diagnosis  Obesity r/t long history of self-monitoring deficit and excessive energy intake aeb BMI >30.    Nutrition Intervention  Materials/education provided on Volumetric eating to help satiety level on fewer calories; portion control and healthy food choices (Plate Method and Volumetrics handouts), meal and snack planning and mindful eating. Encouraged pt to work on establishing a structured meal pattern. Discussed use of meal replacements as back up to healthy meal or for convenience. Provided pt with list of goals and handouts via AVS/Bulbhart.     Patient demonstrates " understanding.    Expected Engagement: good  Follow-Up Plans: Meal planning, exercise     Nutrition Goals  1) Eat 3 meals per day   - Add in a small breakfast - protein shake; eggs and veggies; low-fat/low-sugar Greek yogurt + 1 cup fruit  2) Aim for half your plate as non-starchy veggies at lunch and dinner  3) Choose lower calorie ways to flavor foods - herbs/spices, vinegar, cooking marjan.    The Plate Method  [email or web address]    Protein Sources for Weight Loss  [email or web address]     Carbohydrates  [email or web address]     Mindful Eating  [email or web address]     Summary of Volumetrics Eating Plan  [email or web address]     Meal Replacement Shake Options:    Health VL smoothie (160 Calories, 20 g protein)   Premier Protein (160 Calories, 30 g protein)  Slim Fast Advanced Nutrition (180 Calories, 20 g protein)  Muscle Milk, lactose-free, 17 oz bottle (210 Calories, 30 g protein)  Integrated Supplements, no artificial sugars (110 Calories, 20 g protein)  Genepro, unflavored protein powder (60 Calories, 30 g protein)  *Protein Shake Criteria: no more than 210 Calories, at least 20 grams of protein, and less than 10 grams of sugar     Meal Replacement Bar Options:   Health [facility] Protein Shake (160 Calories, 15 g protein)  Quest Protein Bars (190 Calories, 20 g protein)  Built Bar (170 Calories, 15-20 g protein)  One Protein Bar (210 calories, 20 g protein)  [place] Signature Protein Bar ([employer]) (190 Calories, 21 g protein)  Pure Protein Bars (180 Calories, 21 g protein)    Frozen Meal Replacements  Healthy Choice  Lean Cuisine  Atkins Meals  Smart Ones    Follow-Up:  1 month, or KELSEAN      Jailene Eisenberg RD, LD

## 2021-01-04 NOTE — TELEPHONE ENCOUNTER
Called informing pt to complete questionnaire on mychart prior to visit. LVM with cc number for callback.  Eileen Bhagat, EMT

## 2021-01-05 ENCOUNTER — VIRTUAL VISIT (OUTPATIENT)
Dept: ENDOCRINOLOGY | Facility: CLINIC | Age: 31
End: 2021-01-05
Payer: COMMERCIAL

## 2021-01-05 ENCOUNTER — PRE VISIT (OUTPATIENT)
Dept: ENDOCRINOLOGY | Facility: CLINIC | Age: 31
End: 2021-01-05

## 2021-01-05 VITALS — BODY MASS INDEX: 33.49 KG/M2 | HEIGHT: 62 IN | WEIGHT: 182 LBS

## 2021-01-05 DIAGNOSIS — E66.811 OBESITY, CLASS I, BMI 30-34.9: Primary | ICD-10-CM

## 2021-01-05 DIAGNOSIS — F41.8 DEPRESSION WITH ANXIETY: ICD-10-CM

## 2021-01-05 DIAGNOSIS — E66.9 OBESITY: ICD-10-CM

## 2021-01-05 DIAGNOSIS — Z71.3 NUTRITIONAL COUNSELING: Primary | ICD-10-CM

## 2021-01-05 DIAGNOSIS — R53.82 CHRONIC FATIGUE: ICD-10-CM

## 2021-01-05 PROCEDURE — 99204 OFFICE O/P NEW MOD 45 MIN: CPT | Mod: 95 | Performed by: NURSE PRACTITIONER

## 2021-01-05 PROCEDURE — 97802 MEDICAL NUTRITION INDIV IN: CPT | Mod: 95 | Performed by: DIETITIAN, REGISTERED

## 2021-01-05 RX ORDER — TOPIRAMATE 25 MG/1
TABLET, FILM COATED ORAL
Qty: 90 TABLET | Refills: 1 | Status: SHIPPED | OUTPATIENT
Start: 2021-01-05 | End: 2021-02-24

## 2021-01-05 ASSESSMENT — MIFFLIN-ST. JEOR: SCORE: 1493.8

## 2021-01-05 ASSESSMENT — PAIN SCALES - GENERAL: PAINLEVEL: NO PAIN (0)

## 2021-01-05 ASSESSMENT — PATIENT HEALTH QUESTIONNAIRE - PHQ9: SUM OF ALL RESPONSES TO PHQ QUESTIONS 1-9: 9

## 2021-01-05 NOTE — NURSING NOTE
"Chief Complaint   Patient presents with     Consult     New mwm.       Vitals:    01/05/21 0831   Weight: 82.6 kg (182 lb)   Height: 1.575 m (5' 2\")       Body mass index is 33.29 kg/m .                            KATY GORDON, EMT    "

## 2021-01-05 NOTE — PROGRESS NOTES
PHQ 1/5/2021 1/5/2021   PHQ-9 Total Score 9 11   Q9: Thoughts of better off dead/self-harm past 2 weeks Not at all Not at all     DOMINIC-7 SCORE 1/5/2021   Total Score 10 (moderate anxiety)   Total Score 10

## 2021-01-05 NOTE — PROGRESS NOTES
Alba S Kasey Lee is a 30 year old female who is being evaluated via a billable video visit.      How would you like to obtain your AVS? MyChart  If the video visit is dropped, the invitation should be resent by: Text to cell phone: [phone]  Will anyone else be joining your video visit? No      Video Start Time: 0902  {  Video-Visit Details    Type of service:  Video Visit    Video End Time:0936    Originating Location (pt. Location): Home    Distant Location (provider location):  [facility] [place]     Platform used for Video Visit: [employer] Mobile
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
## 2021-01-05 NOTE — PROGRESS NOTES
Depression Response    Patient completed the PHQ-9 assessment for depression and scored >9? No  Question 9 on the PHQ-9 was positive for suicidality? No  Does patient have current mental health provider? Yes    Is this a virtual visit? Yes   Does patient have suicidal ideation (positive question 9)? No - offer to place Mental Health Referral.  {AMB VIRTUAL VISIT POSITIVE PHQ9 REFERRAL:636298}    I personally notified the following: {Rooming Staff PHQ Notification:857242}
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
## 2021-01-05 NOTE — PATIENT INSTRUCTIONS
"It was a pleasure meeting with you today.    Thank you for allowing us the privilege of caring for you. We hope we provided you with the excellent service you deserve.   Please let us know if there is anything else we can do for you so that we can be sure you are leaving completely satisfied with your care experience.    To ensure the quality of our services you may be receiving a patient satisfaction survey from an independent patient satisfaction monitoring company.    The greatest compliment you can give is a \"Likely to Recommend\"      Your visit was with Radha Alvarado NP today.     Instructions per today's visit:   -start topiramate- taper on bottle - education below- consider second for of contraceptive while taking   -labs when able   -depression screening in [facility]   -dietitian today and in one month  -follow up with me in 2 months   -consider establishing care with mental health therapist to address eating- let me know if you need a referral  -establish care with primary care provider       To schedule appointments with our team, please call [phone] option #1    Please call during clinic hours Monday through Friday 8:00a - 4:00p if you have questions or you can contact us via EmergenSee at anytime.      Nurses: [phone]  Fax: [phone]   _____________________________________________________________________________________________________________________________    Meal Replacement Products:    Here is the link to our new e-store where you can purchase our meal replacement products    [facility] E-Store  Cloud [email or web address]    The one week starter kit is a great way to sample a variety of products and see what works for you.    If you want more information about the product go to: [employer]    Free Shipping for orders over $75     Benefits of meal replacements products:    Portion and calorie control  Improved nutrition  Structured eating  Simplified food " choices  Avoid contact with trigger foods  ______________________________________________________________________________________________________________________________    Healthy Lifestyle Support Group   [facility] Weight Management Program  Virtual Support Group Now Available    60 minutes of small group guided discussion, support and resources    Led by Health , Nani Lee    For questions, and to receive the invite information, contact Nani at [email or web address]    All our welcome!    One Friday per month, 12:30pm to 1:30pm  SELF COMPASSION: July 31st  CREATING MY WELLNESS VISION: August 28th  MINDFULNESS PRACTICES FOR A CALM BODY & MIND: September 25th  MINDFUL EATING TOOLS: October 30th  HEALTHY& HAPPY HOLIDAYS: November 20th  OPEN FORUM: December 18th  _______________________________________________________________________________________________________________________________    Connections: Bariatric Care support group  Due to the Covid-19 restrictions, we will be doing our support group virtually using Microsoft Teams. You will need to get an invitation to the group from Ihsan Banks, Ph.D., LP at [email or web address] and to learn about using Microsoft Teams. The next meeting is on Tuesday, July 14 between 6:30 and 8 PM and there will be no formal speaker.    This group meets the second Tuesday of each month from 6:30 to 8 PM and will be held again at [facility] in the [address] [place] [address] (A-B room) when the Covid-19 restrictions are lifted. The group is led by Ihsan Banks, Ph.D., Licensed Psychologist, [facility] Comprehensive Weight Management Program. There is no cost for group participation and is open to all [facility] (and those external to this program) pre- and post-operative bariatric surgery patients as well as their support system.    _________________________________________________________________________________________________________________________________      Interested in working with a health ?  Health coaches work with you to improve your overall health and wellbeing.  They look at the whole person, and may involve discussion of different areas of life, including, but not limited to the four pillars of health (sleep, exercise, nutrition, and stress management). Discuss with your care team if you would like to start working a health .     Health Coaching-3 Pack:      $99 for three health coaching visits    Visits may be done in person or via phone    Coaching is a partnership between the  and the client; Coaches do not prescribe or diagnose    Coaching helps inspire the client to reach his/her personal goals   __________________________________________________________________________________________________________________________________    [facility] Get Moving Program    Our team of physical therapists is trained to help you understand and take control of your condition. They will perform a thorough evaluation to determine your ability for activity and develop a customized plan to fit your goals and physical ability.  Scheduling: Unsure if the Get Moving program is right for you? Discuss the program with your medical provider or diabetes educator. You can also call us at [phone] to ask questions or schedule an appointment.   [facility] Get Moving Program  ______________________________________________________________________________________________________________________  Bluetooth Scale:    We hope to provide you with high quality telephone and virtual healthcare visits while social distancing for COVID-19 is necessary, as well as in the future when virtual visits may be more convenient for you.     Our technology team made it possible for [employer] Software scales to send weight measurements to our  electronic medical record. This allows weights from you weighing at home to securely flow into the medical record, which will improve telephone and virtual visits.   Additionally, studies have shown that adults actually lose more weight when their weights are automatically sent to someone else, and also that this process is not stressful for those adults.    Below is a link for purchasing the scale, with a discount code for our patients. You may call your insurance company to see if they will reimburse you for the cost of the scale, as a piece of durable medical equipment. The scales only go up to a weight of 400 pounds. This is an issue and we are working with the developer on increasing this. We found no scales that go over 400lb that have blue-tooth for connecting to [employer].    Scale to purchase: the Indix  Body  Scale: [email or web address]/us/en/body/shop?[email or web address]=aw.ds    Discount Code: We have a discount code for our patients to bring the cost down to $50, the code is: [place]_Scale_20%off    Steps to link the scale to [employer] via an Android Phone (you can always disconnect at any point in the future):  1. The order must be placed first before the patient can access Track My Health within [employer].  2. Download Google Fit patrick from the Google Play Store   a. Log in or register using your Google account   3. Download the [employer] patrick from Google Play Store  a. Select add organization   b. Search for [employer] and select it   c. Log into [employer]  d. Select Track My Health   e. Select the green connect my account button   f. When prompted log into your Google account   g. Select okay to confirm the account   4. Download the Withings Health Mate patrick from Google Play Store  a. [place] for Indix   b. Go to profile   c. Tap google fit under the Apps section  d. Select the option to activate Google Fit integration   e. Select the same Google  account   f. Select okay to confirm the account  g.   Steps to link the scale to [employer] Manager via an iPhone (you can always disconnect at any point in the future):  **Note WeGreek is not available for download on an iPad**  1. The order must be placed first before the patient can access Track My Health within [employer] Manager.  2. Locate the Health alesha on your iPhone.  a. Set up your Apple Health account as prompted  b. The Sources page will show Apps that communicate with your Health alesha. Once all steps are completed, you should see Purfresh and [employer] listed under the Apps section and your iPhone under the devices section.  i. Select Health Mate  1. Under 'ALLOW  Selectron  TO WRITE DATA ensure the toggle is on for Weight.  2. This will allow the scale to add your weight to the Apple Health  ii. Select MyChart  1. Under 'ALLOW  Dizzion  TO READ DATA ensure the toggle is on for Weight.  2. This allows Soccer Manager to grab the weight from WeGreek so your provider can see your weights.  3. Download the Soccer Manager alesha from the Alesha Store   a. Select add organization                                                  b. Search for [employer] and select it  c. Log into Soccer Manager  d. Select Track My Health   e. Select the green connect my account button   f. Follow prompts to link your device to Soccer Manager.  4. Download the Withings health mate alesha in the Alesha Store   a. [place] for Healthcentrix   b. Go to profile   c. [employer] Health under the Apps section  d. If prompted to allow access with the Health Alesha, toggle weight on for read and write access.         MEDICATION STARTED AT THIS APPOINTMENT  We are starting topiramate at bedtime.  Start one tab, 25 mg, for a week. Go up to 50 mg (2 tabs) for the next week. At the third week, take   3 tabs (75 mg).  Stay at 3 tabs until you are seen again. Contact the nurse via Soccer Manager or call [phone] if you have any questions or concerns. (Do not stop taking it if you don't think it's working. For  some people it works even though they do not feel much different.)    Topiramate (Topamax) is a medication that is used most often to treat migraine headaches or for seizures. It has also been found to help with weight loss. Although it's not currently FDA approved for weight loss, it has been used safely for a number of years to help people who are carrying extra weight.     Just how topiramate helps with weight loss has not been exactly determined. However it seems to work on areas of the brain to quiet down signals related to eating.      Topiramate may make you:    >feel less interest in eating in between meals   >think less about food and eating   >find it easier to push the plate away   >find giving up pop easier    >have an easier time eating less    For some of our patients, the pills work right away. They feel and think quite differently about food. Other patients don't feel much of a change but find in fact they have lost weight! Like all weight loss medications, topiramate works best when you help it work.  This means:    1) Have less tempting high calorie (fattening) food around the house or office    2) Have lower calorie food (fruits, vegetables,low fat meats and dairy) for snacks    3) Eat out only one time or less each week.   4) Eat your meals at a table with the TV or computer off.    Side-effects. Topiramate is generally well tolerated. The main side-effects we see are:   Tingling in hands,feet, or face (usually not very troublesome)   Mental confusion and word finding trouble (about 10% of patients have this.)     Feeling sleepy or a bit dopey- this goes away very soon after starting.    One of the dangers of topiramate is the possibility of birth defects--if you get pregnant when you are on it, there is the risk that your baby will be born with a cleft lip or palate.  If you are on topiramate and of child bearing age, you need to be on a reliable form of birth control or refrain from sexual  intercourse.     Please refer to the pharmacy insert for more information on side-effects. Since many pharmacists are not familiar with the use of topiramate in weight loss, calling the clinic will get you the most accurate information on the use of this medication for weight loss.     In order to get refills of this or any medication we prescribe you must be seen in the medical weight mgmt clinic every 2-3 months.

## 2021-01-05 NOTE — PATIENT INSTRUCTIONS
Nutrition Goals:  1) Eat 3 meals per day   - Add in a small breakfast - protein shake; eggs and veggies; low-fat/low-sugar Greek yogurt + 1 cup fruit  2) Aim for half your plate as non-starchy veggies at lunch and dinner  3) Choose lower calorie ways to flavor foods - herbs/spices, vinegar, cooking marjan.    The Plate Method  [email or web address]    Protein Sources for Weight Loss  [email or web address]     Carbohydrates  [email or web address]     Mindful Eating  [email or web address]     Summary of Volumetrics Eating Plan  [email or web address]     Meal Replacement Shake Options:    Health VL smoothie (160 Calories, 20 g protein)   Premier Protein (160 Calories, 30 g protein)  Slim Fast Advanced Nutrition (180 Calories, 20 g protein)  Muscle Milk, lactose-free, 17 oz bottle (210 Calories, 30 g protein)  Integrated Supplements, no artificial sugars (110 Calories, 20 g protein)  Genepro, unflavored protein powder (60 Calories, 30 g protein)  *Protein Shake Criteria: no more than 210 Calories, at least 20 grams of protein, and less than 10 grams of sugar     Meal Replacement Bar Options:  [facility] Protein Shake (160 Calories, 15 g protein)  Quest Protein Bars (190 Calories, 20 g protein)  Built Bar (170 Calories, 15-20 g protein)  One Protein Bar (210 calories, 20 g protein)  [place] Signature Protein Bar (Costco) (190 Calories, 21 g protein)  Pure Protein Bars (180 Calories, 21 g protein)    Frozen Meal Replacements  Healthy Choice  Lean Cuisine  Atkins Meals  Smart Ones    You may purchase meal replacement products online at our e-store. Visit e-store at [email or web address]/store   - The one week starter kits is a great way to sample a variety of products.  - For recipes and ideas on how to use products, visit - CardioFocus        Follow-up in one month. Call [phone] to schedule.

## 2021-01-05 NOTE — LETTER
"1/5/2021       RE: Alba Lee  [address]  [facility] [address]     Dear Colleague,    Thank you for referring your patient, Alba Lee, to the [facility] [place] at [facility]. Please see a copy of my visit note below.    Alba Lee is a 30 year old female who is being evaluated via a billable video visit.      The patient has been notified of following:     \"This video visit will be conducted via a call between you and your physician/provider. We have found that certain health care needs can be provided without the need for an in-person physical exam.  This service lets us provide the care you need with a video conversation.  If a prescription is necessary we can send it directly to your pharmacy.  If lab work is needed we can place an order for that and you can then stop by our lab to have the test done at a later time.    Video visits are billed at different rates depending on your insurance coverage.  Please reach out to your insurance provider with any questions.    If during the course of the call the physician/provider feels a video visit is not appropriate, you will not be charged for this service.\"    Patient has given verbal consent for Video visit? Yes  How would you like to obtain your AVS? MyChart  Will anyone else be joining your video visit? No        Video-Visit Details    Type of service:  Video Visit    Video Start Time: 9:40 AM  Video End Time: 10:06 AM  Time spent with patient: 26 minutes    Originating Location (pt. Location): Home    Distant Location (provider location):  [facility] [place]     Platform used for Video Visit: [employer] home [employer]ations    During this virtual visit the patient is located in [place], patient verifies this as the location during the entirety of this visit.     New Weight Management Nutrition Consultation    Alba Lee is a 30 " "year old female presents today for new weight management nutrition consultation.  Patient referred by Radha Alvaraod NP on January 5, 2021.    Patient with Co-morbidities of obesity including:  Type II DM no  Renal Failure no  Sleep apnea no  Hypertension no   Dyslipidemia no  Joint pain no  Back pain no  GERD no     Anthropometrics:  Estimated body mass index is 33.29 kg/m  as calculated from the following:    Height as of an earlier encounter on 1/5/21: 1.575 m (5' 2\").    Weight as of an earlier encounter on 1/5/21: 82.6 kg (182 lb).    Medications for Weight Loss:  Topamax    NUTRITION HISTORY  See MD note for details.  NKFA/intolerrances.   Takes Garden of Life protein shake, MVI (melaluka).   Emotional eating  Eating rapidly  Craves cheese and chips. Drinking a lot of water.   Works as a cook, laid off since start of pandemic. When working, would often not eat until 4 pm.   Previous wt loss attempts: meal replacements (replaced one meal per day), keto for a while (did not find very beneficial).      Recent food recall:  Breakfast: skipping or egg sandwich (english muffin + eggs/cheese or potatoes and egg and veggie)  Lunch: protein shake; tacos; spaghetti; bag of Gardettos; sandwich - unplanned meals  Dinner: \"Usually a balanced meal but too late at night.\" Tacos; spaghetti; steak and blue cheese + Brussle sprouts + potato - right before bed  Snacks: Cheese, crackers, chips  Beverages: Water (a gallon per day), occ black tea  Alcohol: 2-4x/month, 3-4 drinks at a time  Dining out: Subway, Pasta a few times since moving in Oct.     Nutrition Prescription  Recommended energy/nutrient modification.  Plate Method/Volumetrics    Nutrition Diagnosis  Obesity r/t long history of self-monitoring deficit and excessive energy intake aeb BMI >30.    Nutrition Intervention  Materials/education provided on Volumetric eating to help satiety level on fewer calories; portion control and healthy food choices (Plate Method and " Volumetrics handouts), meal and snack planning and mindful eating. Encouraged pt to work on establishing a structured meal pattern. Discussed use of meal replacements as back up to healthy meal or for convenience. Provided pt with list of goals and handouts via AVS/[employer].     Patient demonstrates understanding.    Expected Engagement: good  Follow-Up Plans: Meal planning, exercise     Nutrition Goals  1) Eat 3 meals per day   - Add in a small breakfast - protein shake; eggs and veggies; low-fat/low-sugar Greek yogurt + 1 cup fruit  2) Aim for half your plate as non-starchy veggies at lunch and dinner  3) Choose lower calorie ways to flavor foods - herbs/spices, vinegar, cooking marjan.    The Plate Method  [email or web address]    Protein Sources for Weight Loss  [email or web address]     Carbohydrates  [email or web address]     Mindful Eating  [email or web address]     Summary of Etaoshis Eating Plan  [email or web address]     Meal Replacement Shake Options:    Health VL smoothie (160 Calories, 20 g protein)   Premier Protein (160 Calories, 30 g protein)  Slim Fast Advanced Nutrition (180 Calories, 20 g protein)  Muscle Milk, lactose-free, 17 oz bottle (210 Calories, 30 g protein)  Integrated Supplements, no artificial sugars (110 Calories, 20 g protein)  Genepro, unflavored protein powder (60 Calories, 30 g protein)  *Protein Shake Criteria: no more than 210 Calories, at least 20 grams of protein, and less than 10 grams of sugar     Meal Replacement Bar Options:   Health [facility] Protein Shake (160 Calories, 15 g protein)  Quest Protein Bars (190 Calories, 20 g protein)  Built Bar (170 Calories, 15-20 g protein)  One Protein Bar (210 calories, 20 g protein)  [place] Signature Protein Bar (Costco) (190 Calories, 21 g protein)  Pure Protein Bars (180 Calories, 21 g protein)    Frozen Meal Replacements  Healthy Choice  Lean Cuisine  Atkins Meals  Smart  Ones    Follow-Up:  1 month, or PRN      Jailene Eisenberg RD, LD

## 2021-01-15 ENCOUNTER — HEALTH MAINTENANCE LETTER (OUTPATIENT)
Age: 31
End: 2021-01-15

## 2021-02-11 ENCOUNTER — TELEPHONE (OUTPATIENT)
Dept: ENDOCRINOLOGY | Facility: CLINIC | Age: 31
End: 2021-02-11

## 2021-02-11 NOTE — TELEPHONE ENCOUNTER
LVM for patient with call center number and sent [employer] message to schedule next available video return visit with Jailene Eisenberg for a follow-up appointment, as well as a video return visit on around 3/5 for a 2 month follow-up appointment.

## 2021-02-12 ENCOUNTER — TELEPHONE (OUTPATIENT)
Dept: ENDOCRINOLOGY | Facility: CLINIC | Age: 31
End: 2021-02-12

## 2021-02-12 NOTE — TELEPHONE ENCOUNTER
Reason for call:  Other   Patient called regarding (reason for call): call back  Additional comments: Patient called back to let us know that her insurance lapsed and that she will call us once it is back to active coverage to schedule her follow up appointments.    Phone number to reach patient:  Home number on file [phone] (home)    Best Time:  anytime    Can we leave a detailed message on this number?  YES    Travel screening: Not Applicable

## 2021-02-24 DIAGNOSIS — E66.811 OBESITY, CLASS I, BMI 30-34.9: ICD-10-CM

## 2021-02-24 RX ORDER — TOPIRAMATE 25 MG/1
75 TABLET, FILM COATED ORAL AT BEDTIME
Qty: 90 TABLET | Refills: 0 | Status: SHIPPED | OUTPATIENT
Start: 2021-02-24 | End: 2021-03-22

## 2021-03-19 DIAGNOSIS — E55.9 VITAMIN D DEFICIENCY: Primary | ICD-10-CM

## 2021-03-19 DIAGNOSIS — E66.811 OBESITY, CLASS I, BMI 30-34.9: ICD-10-CM

## 2021-03-19 DIAGNOSIS — R53.82 CHRONIC FATIGUE: ICD-10-CM

## 2021-03-19 LAB
ALBUMIN SERPL-MCNC: 4.6 G/DL (ref 3.5–5.7)
ALP SERPL-CCNC: 68 U/L (ref 34–104)
ALT SERPL W P-5'-P-CCNC: 14 U/L (ref 7–52)
ANION GAP SERPL CALCULATED.3IONS-SCNC: 7 MMOL/L (ref 3–14)
AST SERPL W P-5'-P-CCNC: 15 U/L (ref 13–39)
BILIRUB SERPL-MCNC: 0.4 MG/DL (ref 0.3–1)
BUN SERPL-MCNC: 12 MG/DL (ref 7–25)
CALCIUM SERPL-MCNC: 9.2 MG/DL (ref 8.6–10.3)
CHLORIDE SERPL-SCNC: 107 MMOL/L (ref 98–107)
CHOLEST SERPL-MCNC: 196 MG/DL
CO2 SERPL-SCNC: 23 MMOL/L (ref 21–31)
CREAT SERPL-MCNC: 0.83 MG/DL (ref 0.6–1.2)
DEPRECATED CALCIDIOL+CALCIFEROL SERPL-MC: <10 NG/ML
ERYTHROCYTE [DISTWIDTH] IN BLOOD BY AUTOMATED COUNT: 12.4 % (ref 10–15)
FERRITIN SERPL-MCNC: 80 NG/ML (ref 23.9–336.2)
GFR SERPL CREATININE-BSD FRML MDRD: 80 ML/MIN/{1.73_M2}
GLUCOSE SERPL-MCNC: 102 MG/DL (ref 70–105)
HBA1C MFR BLD: 4.7 % (ref 4–6)
HCT VFR BLD AUTO: 41 % (ref 35–47)
HDLC SERPL-MCNC: 66 MG/DL
HGB BLD-MCNC: 13.7 G/DL (ref 11.7–15.7)
LDLC SERPL CALC-MCNC: 120 MG/DL
MCH RBC QN AUTO: 29.1 PG (ref 26.5–33)
MCHC RBC AUTO-ENTMCNC: 33.4 G/DL (ref 31.5–36.5)
MCV RBC AUTO: 87 FL (ref 78–100)
NONHDLC SERPL-MCNC: 131 MG/DL
PLATELET # BLD AUTO: 272 10E9/L (ref 150–450)
POTASSIUM SERPL-SCNC: 4.1 MMOL/L (ref 3.5–5.1)
PROT SERPL-MCNC: 7.5 G/DL (ref 6.4–8.9)
RBC # BLD AUTO: 4.7 10E12/L (ref 3.8–5.2)
SODIUM SERPL-SCNC: 137 MMOL/L (ref 134–144)
TRIGL SERPL-MCNC: 56 MG/DL
VIT B12 SERPL-MCNC: 374 PG/ML (ref 180–914)
WBC # BLD AUTO: 5.3 10E9/L (ref 4–11)

## 2021-03-19 PROCEDURE — 82306 VITAMIN D 25 HYDROXY: CPT | Mod: ZL | Performed by: NURSE PRACTITIONER

## 2021-03-19 PROCEDURE — 83036 HEMOGLOBIN GLYCOSYLATED A1C: CPT | Mod: ZL | Performed by: NURSE PRACTITIONER

## 2021-03-19 PROCEDURE — 85027 COMPLETE CBC AUTOMATED: CPT | Mod: ZL | Performed by: NURSE PRACTITIONER

## 2021-03-19 PROCEDURE — 80061 LIPID PANEL: CPT | Mod: ZL | Performed by: NURSE PRACTITIONER

## 2021-03-19 PROCEDURE — 36415 COLL VENOUS BLD VENIPUNCTURE: CPT | Mod: ZL | Performed by: NURSE PRACTITIONER

## 2021-03-19 PROCEDURE — 82728 ASSAY OF FERRITIN: CPT | Mod: ZL | Performed by: NURSE PRACTITIONER

## 2021-03-19 PROCEDURE — 82607 VITAMIN B-12: CPT | Mod: ZL | Performed by: NURSE PRACTITIONER

## 2021-03-19 PROCEDURE — 80053 COMPREHEN METABOLIC PANEL: CPT | Mod: ZL | Performed by: NURSE PRACTITIONER

## 2021-03-22 ENCOUNTER — VIRTUAL VISIT (OUTPATIENT)
Dept: ENDOCRINOLOGY | Facility: CLINIC | Age: 31
End: 2021-03-22
Payer: COMMERCIAL

## 2021-03-22 VITALS — WEIGHT: 162 LBS | BODY MASS INDEX: 29.81 KG/M2 | HEIGHT: 62 IN

## 2021-03-22 DIAGNOSIS — E66.811 OBESITY, CLASS I, BMI 30-34.9: Primary | ICD-10-CM

## 2021-03-22 DIAGNOSIS — F41.8 DEPRESSION WITH ANXIETY: ICD-10-CM

## 2021-03-22 PROCEDURE — 99214 OFFICE O/P EST MOD 30 MIN: CPT | Mod: 95 | Performed by: NURSE PRACTITIONER

## 2021-03-22 RX ORDER — TOPIRAMATE 25 MG/1
75 TABLET, FILM COATED ORAL AT BEDTIME
Qty: 90 TABLET | Refills: 0 | Status: SHIPPED | OUTPATIENT
Start: 2021-03-22 | End: 2021-05-10

## 2021-03-22 ASSESSMENT — MIFFLIN-ST. JEOR: SCORE: 1403.08

## 2021-03-22 ASSESSMENT — PAIN SCALES - GENERAL: PAINLEVEL: NO PAIN (0)

## 2021-03-22 NOTE — NURSING NOTE
"Chief Complaint   Patient presents with     RECHECK     Follow up mwm.       Vitals:    03/22/21 1559   Weight: 73.5 kg (162 lb)   Height: 1.575 m (5' 2\")       Body mass index is 29.63 kg/m .                            KATY GORDON, EMT    "

## 2021-03-22 NOTE — ASSESSMENT & PLAN NOTE
Down 20lbs (10% total body weight since 1/5/2021) when starting weight management. Doing well on topiramate 75mg - feels like a helpful tool for her- decreased thoughts about food, easier time making food choices, improved cravings. Has decreased portion sizes and increased vegetables. Denies adverse side effects. Mood feels good. Will continue topiramate at 75mg. Continue with RD    Labs reviewed- started vitamin D. Discussed cholesterol. Overall reassuring.     Discussed exercise and finding enjoyable exercise to help with weight maintenance and mood overall. Has a very active job.     Has quit smoking x 3 months.

## 2021-03-22 NOTE — LETTER
3/22/2021       RE: Alba Lee  [address]  [facility] [address]     Dear Colleague,    Thank you for referring your patient, Alba Lee, to the [facility]NA[facility] [place] [facility]. Please see a copy of my visit note below.    Return Medical Weight Management Note     Alba Lee  MRN:  [id number]  :  1990  LUNA:  3/22/2021    Dear Marlene Coto, APRN CNP,    I had the pleasure of seeing your patient Alba Lee. She is a 30 year old female who I am continuing to see for treatment of obesity related to:       2021   I have the following health issues associated with obesity: None of the above   I have the following symptoms associated with obesity: Depression, Fatigue       Assessment & Plan   Problem List Items Addressed This Visit        Behavioral    Depression with anxiety     Stable. Screening sent via [employer]          Relevant Medications    topiramate (TOPAMAX) 25 MG tablet       Other    Obesity, Class I, BMI 30-34.9 - Primary     Down 20lbs (10% total body weight since 2021) when starting weight management. Doing well on topiramate 75mg - feels like a helpful tool for her- decreased thoughts about food, easier time making food choices, improved cravings. Has decreased portion sizes and increased vegetables. Denies adverse side effects. Mood feels good. Will continue topiramate at 75mg. Continue with RD    Labs reviewed- started vitamin D. Discussed cholesterol. Overall reassuring.     Discussed exercise and finding enjoyable exercise to help with weight maintenance and mood overall. Has a very active job.     Has quit smoking x 3 months.            Relevant Medications    topiramate (TOPAMAX) 25 MG tablet             34 minutes spent on the date of the encounter doing chart review, history and exam, documentation and further activities as noted  "above  0956}  MEDICATIONS:  Continue current medications without change  FUTURE APPOINTMENTS:       - Follow-up visit in 2 months       - RD 1-2 months     New MWM 1/5/2021   INTERVAL HISTORY:  Started topiramate, taper to 75mg at consult. Labs showed- vitamin D deficiency, elevated LDL but total cholesterol normal.   Has since had an insurance lapse.     topiramate- has decreased thoughts about foods, increased cravings for vegetables. Some tingling but not bothersome -     Continues to crave cheese. Has questions about how much is too much or if she needs to \"give this up\"     Hasn't found time for exercise yet. Works as a [employer]- standing all day     Has not had any alcohol - warning on medication.     Was only smoking when drinking. Was only smoking when drinking. Has quit smoking as a consequence.     Mood is okay. No adverse side effects.     PHQ 1/5/2021 1/5/2021   PHQ-9 Total Score 9 11   Q9: Thoughts of better off dead/self-harm past 2 weeks Not at all Not at all     DOMINIC-7 SCORE 1/5/2021   Total Score 10 (moderate anxiety)   Total Score 10         CURRENT WEIGHT:   162 lbs 0 oz    Initial Weight (lbs): 182 lbs  Last Visits Weight: 82.6 kg (182 lb)  Cumulative weight loss (lbs): 20  Weight Loss Percentage: 10.99%    Changes and Difficulties 3/22/2021   I have made the following changes to my diet since my last visit: Eat less, eat a lot more vegetables,  often in place of meat, dont eat after 7pm, always take my meal replacement shake in the morning   With regards to my diet, I am still struggling with: Need to cut back on cheese consumption   With regards to my activity/exercise, I am still struggling with: I need to exercise, I work 7 days a week and have not made the time to exercise       VITALS:  Ht 1.575 m (5' 2\")   Wt 73.5 kg (162 lb)   BMI 29.63 kg/m      MEDICATIONS:   Current Outpatient Medications   Medication Sig Dispense Refill     desogestrel-ethinyl estradiol (RECLIPSEN) 0.15-30 MG-MCG tablet " Take 1 tablet by mouth daily 84 tablet 0     topiramate (TOPAMAX) 25 MG tablet Take 3 tablets (75 mg) by mouth At Bedtime 90 tablet 0     vitamin D3 (CHOLECALCIFEROL) 1.25 MG (38985 UT) capsule Take 1 capsule (50,000 Units) by mouth every 7 days 12 capsule 0       Weight Loss Medication History Reviewed With Patient 3/22/2021   Which weight loss medications are you currently taking on a regular basis?  Topamax (topiramate)   Are you having any side effects from the weight loss medication that we have prescribed you? Yes   If you are having side effects please describe: Tingling       Orders Only on 03/19/2021   Component Date Value Ref Range Status     Vitamin B12 03/19/2021 374  180 - 914 pg/mL Final     Cholesterol 03/19/2021 196  <200 mg/dL Final     Triglycerides 03/19/2021 56  <150 mg/dL Final     HDL Cholesterol 03/19/2021 66  >49 mg/dL Final     LDL Cholesterol Calculated 03/19/2021 120* <100 mg/dL Final    Comment: Above desirable:  100-129 mg/dl  Borderline High:  130-159 mg/dL  High:             160-189 mg/dL  Very high:       >189 mg/dl       Non HDL Cholesterol 03/19/2021 131* <130 mg/dL Final    Comment: Above Desirable:  130-159 mg/dl  Borderline high:  160-189 mg/dl  High:             190-219 mg/dl  Very high:       >219 mg/dl       Hemoglobin A1C 03/19/2021 4.7  4.0 - 6.0 % Final     Ferritin 03/19/2021 80  23.9 - 336.2 ng/mL Final     WBC 03/19/2021 5.3  4.0 - 11.0 10e9/L Final     RBC Count 03/19/2021 4.70  3.8 - 5.2 10e12/L Final     Hemoglobin 03/19/2021 13.7  11.7 - 15.7 g/dL Final     Hematocrit 03/19/2021 41.0  35.0 - 47.0 % Final     MCV 03/19/2021 87  78 - 100 fl Final     MCH 03/19/2021 29.1  26.5 - 33.0 pg Final     MCHC 03/19/2021 33.4  31.5 - 36.5 g/dL Final     RDW 03/19/2021 12.4  10.0 - 15.0 % Final     Platelet Count 03/19/2021 272  150 - 450 10e9/L Final     Sodium 03/19/2021 137  134 - 144 mmol/L Final     Potassium 03/19/2021 4.1  3.5 - 5.1 mmol/L Final     Chloride 03/19/2021 107  " 98 - 107 mmol/L Final     Carbon Dioxide 03/19/2021 23  21 - 31 mmol/L Final     Anion Gap 03/19/2021 7  3 - 14 mmol/L Final     Glucose 03/19/2021 102  70 - 105 mg/dL Final     Urea Nitrogen 03/19/2021 12  7 - 25 mg/dL Final     Creatinine 03/19/2021 0.83  0.60 - 1.20 mg/dL Final     GFR Estimate 03/19/2021 80  >60 mL/min/[1.73_m2] Final     GFR Estimate If Black 03/19/2021 >90  >60 mL/min/[1.73_m2] Final     Calcium 03/19/2021 9.2  8.6 - 10.3 mg/dL Final     Bilirubin Total 03/19/2021 0.4  0.3 - 1.0 mg/dL Final     Albumin 03/19/2021 4.6  3.5 - 5.7 g/dL Final     Protein Total 03/19/2021 7.5  6.4 - 8.9 g/dL Final     Alkaline Phosphatase 03/19/2021 68  34 - 104 U/L Final     ALT 03/19/2021 14  7 - 52 U/L Final     AST 03/19/2021 15  13 - 39 U/L Final     Vitamin D Total 03/19/2021 <10.0  ng/mL Final    Comment: Comments:  Deficiency:             <10 ng/mL  Insufficiency:        10-29 ng/mL  Sufficiency:          ng/mL  Possible Toxicity:     >100 ng/mL           PHYSICAL EXAM:  Objective    Ht 1.575 m (5' 2\")   Wt 73.5 kg (162 lb)   BMI 29.63 kg/m    Vitals - Patient Reported  Pain Score: No Pain (0)      Vitals:  No vitals were obtained today due to virtual visit.    Physical Exam   GENERAL: Healthy, alert and no distress  EYES: Eyes grossly normal to inspection.  No discharge or erythema, or obvious scleral/conjunctival abnormalities.  RESP: No audible wheeze, cough, or visible cyanosis.  No visible retractions or increased work of breathing.    SKIN: Visible skin clear. No significant rash, abnormal pigmentation or lesions.  NEURO: Cranial nerves grossly intact.  Mentation and speech appropriate for age.  PSYCH: Mentation appears normal, affect normal/bright, judgement and insight intact, normal speech and appearance well-groomed.      Sincerely,    JACQUI Grubbse is a 30 year old who is being evaluated via a billable video visit.      How would you like to obtain your AVS? MyChart  If the " video visit is dropped, the invitation should be resent by: Text to cell phone: [phone]  Will anyone else be joining your video visit? No    Video Start Time: 1627  Video-Visit Details    Type of service:  Video Visit    Video End Time:16: 50    Originating Location (pt. Location): Home    Distant Location (provider location):  [facility] WE[facility]NAGEMENT [facility]IN[facility] [place]     Platform used for Video Visit: [employer]

## 2021-03-22 NOTE — PATIENT INSTRUCTIONS
"It was a pleasure meeting with you today.    Thank you for allowing us the privilege of caring for you. We hope we provided you with the excellent service you deserve.   Please let us know if there is anything else we can do for you so that we can be sure you are leaving completely satisfied with your care experience.    To ensure the quality of our services you may be receiving a patient satisfaction survey from an independent patient satisfaction monitoring company.    The greatest compliment you can give is a \"Likely to Recommend\"      Your visit was with Radha Alvarado NP today.     Instructions per today's visit:     MEDICATIONS:  Continue current medications without change  FUTURE APPOINTMENTS:       - Follow-up visit in 2 months       - RD 1-2 months       To schedule appointments with our team, please call [phone] option #1    Please call during clinic hours Monday through Friday 8:00a - 4:00p if you have questions or you can contact us via [employer] at anytime.      Nurses: [phone]  Fax: [phone]   _____________________________________________________________________________________________________________________________    Meal Replacement Products:    Here is the link to our new e-store where you can purchase our meal replacement products    [facility] E-Store  [email or web address]    The one week starter kit is a great way to sample a variety of products and see what works for you.    If you want more information about the product go to: [employer]    Free Shipping for orders over $75     Benefits of meal replacements products:    Portion and calorie control  Improved nutrition  Structured eating  Simplified food choices  Avoid contact with trigger foods  ______________________________________________________________________________________________________________________________    Healthy Lifestyle Support Group    [facility] " Program  Virtual Support Group Now Available    60 minutes of small group guided discussion, support and resources    Led by Health , Nani Lee    For questions, and to receive the invite information, contact Nani at [email or web address]    All our welcome!    One Friday per month, 12:30pm to 1:30pm  SELF COMPASSION: July 31st  CREATING MY WELLNESS VISION: August 28th  MINDFULNESS PRACTICES FOR A CALM BODY & MIND: September 25th  MINDFUL EATING TOOLS: October 30th  HEALTHY& HAPPY HOLIDAYS: November 20th  OPEN FORUM: December 18th  _______________________________________________________________________________________________________________________________    Connections: Bariatric Care support group  Due to the Covid-19 restrictions, we will be doing our support group virtually using Microsoft Teams. You will need to get an invitation to the group from Ihsan Banks, Ph.D., LP at [email or web address] and to learn about using Microsoft Teams. The next meeting is on Tuesday, July 14 between 6:30 and 8 PM and there will be no formal speaker.    This group meets the second Tuesday of each month from 6:30 to 8 PM and will be held again at [facility] in the [address] [place] [address] (A-B room) when the Covid-19 restrictions are lifted. The group is led by Ihsan Banks, Ph.D., Licensed Psychologist, [facility] Comprehensive Weight Management Program. There is no cost for group participation and is open to all [facility] (and those external to this program) pre- and post-operative bariatric surgery patients as well as their support system.   _________________________________________________________________________________________________________________________________      Interested in working with a health ?  Health coaches work with you to improve your overall health and wellbeing.  They look at the whole person, and may involve discussion of different areas of life,  including, but not limited to the four pillars of health (sleep, exercise, nutrition, and stress management). Discuss with your care team if you would like to start working a health .     Health Coaching-3 Pack:      $99 for three health coaching visits    Visits may be done in person or via phone    Coaching is a partnership between the  and the client; Coaches do not prescribe or diagnose    Coaching helps inspire the client to reach his/her personal goals   __________________________________________________________________________________________________________________________________    [facility] Get Moving Program    Our team of physical therapists is trained to help you understand and take control of your condition. They will perform a thorough evaluation to determine your ability for activity and develop a customized plan to fit your goals and physical ability.  Scheduling: Unsure if the Get Moving program is right for you? Discuss the program with your medical provider or diabetes educator. You can also call us at [phone] to ask questions or schedule an appointment.   [facility] Get Moving Program  ______________________________________________________________________________________________________________________  Bluetooth Scale:    We hope to provide you with high quality telephone and virtual healthcare visits while social distancing for COVID-19 is necessary, as well as in the future when virtual visits may be more convenient for you.     Our technology team made it possible for Bluetooth scales to send weight measurements to our electronic medical record. This allows weights from you weighing at home to securely flow into the medical record, which will improve telephone and virtual visits.   Additionally, studies have shown that adults actually lose more weight when their weights are automatically sent to someone else, and also that this process is not stressful for those  adults.    Below is a link for purchasing the scale, with a discount code for our patients. You may call your insurance company to see if they will reimburse you for the cost of the scale, as a piece of durable medical equipment. The scales only go up to a weight of 400 pounds. This is an issue and we are working with the developer on increasing this. We found no scales that go over 400lb that have blue-tooth for connecting to [employer] biotec.    Scale to purchase: the [employer]  Body  Scale: [email or web address]/body/[email or web address]=aw.ds    Discount Code: We have a discount code for our patients to bring the cost down to $50, the code is: [place]_Scale_20%off    Steps to link the scale to [employer] biotec via an Android Phone (you can always disconnect at any point in the future):  1. The order must be placed first before the patient can access Track My Health within [employer].  2. Download Google Fit patrick from the Google Play Store   a. Log in or register using your Google account   3. Download the multiBIND biotec patrick from Google Play Store  a. Select add organization   b. Search for [employer] and select it   c. Log into [employer] biotec  d. Select Track My Health   e. Select the green connect my account button   f. When prompted log into your Google account   g. Select okay to confirm the account   4. Download the [employer] Health Mate patrick from Google Play Store  a. Martin for [employer]   b. Go to profile   c. Tap google fit under the Apps section  d. Select the option to activate Google Fit integration   e. Select the same Google account   f. Select okay to confirm the account  g.   Steps to link the scale to [employer] biotec via an iPhone (you can always disconnect at any point in the future):  **Note [employer] is not available for download on an iPad**  1. The order must be placed first before the patient can access Track My Health within [employer] biotec.  2. Locate the Health patrick on your  iPhone.  a. Set up your Apple Health account as prompted  b. The Sources page will show Apps that communicate with your Health alesha. Once all steps are completed, you should see Physician Referral Network (PRN) and [employer] listed under the Apps section and your iPhone under the devices section.  i. Select Health Mate  1. Under 'ALLOW  [employer]  TO WRITE DATA ensure the toggle is on for Weight.  2. This will allow the scale to add your weight to the Apple Health  ii. Select L2hart  1. Under 'ALLOW  [employer]Love  TO READ DATA ensure the toggle is on for Weight.  2. This allows [employer] to grab the weight from [employer] so your provider can see your weights.  3. Download the eB[employer]Tech alesha from the Alesha Store   a. Select add organization                                                  b. Search for [employer]nallyMe and select it  c. Log into [employer]BaoTech  d. Select Track My Health   e. Select the green connect my account button   f. Follow prompts to link your device to [employer].  4. Download the Withings health mate alesha in the Alesha Store   a. Eliot for [employer]   b. Go to profile   c. Tap Health under the Apps section  d. If prompted to allow access with the Health Alesha, toggle weight on for read and write access.

## 2021-03-22 NOTE — PROGRESS NOTES
Alba is a 30 year old who is being evaluated via a billable video visit.      How would you like to obtain your AVS? MyChart  If the video visit is dropped, the invitation should be resent by: Text to cell phone: [phone]  Will anyone else be joining your video visit? No      Video Start Time: 1627  Video-Visit Details    Type of service:  Video Visit    Video End Time:16: 50    Originating Location (pt. Location): Home    Distant Location (provider location):  [facility] WE[facility] MANAGEMENT CLIN[facility] [place]     Platform used for Video Visit: [email or web address]

## 2021-04-28 ENCOUNTER — OFFICE VISIT (OUTPATIENT)
Dept: OBGYN | Facility: OTHER | Age: 31
End: 2021-04-28
Attending: STUDENT IN AN ORGANIZED HEALTH CARE EDUCATION/TRAINING PROGRAM
Payer: COMMERCIAL

## 2021-04-28 VITALS
DIASTOLIC BLOOD PRESSURE: 82 MMHG | HEIGHT: 62 IN | HEART RATE: 88 BPM | OXYGEN SATURATION: 98 % | WEIGHT: 163.1 LBS | SYSTOLIC BLOOD PRESSURE: 122 MMHG | RESPIRATION RATE: 18 BRPM | BODY MASS INDEX: 30.01 KG/M2

## 2021-04-28 DIAGNOSIS — F41.8 DEPRESSION WITH ANXIETY: Primary | ICD-10-CM

## 2021-04-28 DIAGNOSIS — Z30.011 ENCOUNTER FOR INITIAL PRESCRIPTION OF CONTRACEPTIVE PILLS: ICD-10-CM

## 2021-04-28 PROCEDURE — G0463 HOSPITAL OUTPT CLINIC VISIT: HCPCS | Performed by: STUDENT IN AN ORGANIZED HEALTH CARE EDUCATION/TRAINING PROGRAM

## 2021-04-28 PROCEDURE — 99205 OFFICE O/P NEW HI 60 MIN: CPT | Performed by: STUDENT IN AN ORGANIZED HEALTH CARE EDUCATION/TRAINING PROGRAM

## 2021-04-28 RX ORDER — ACETAMINOPHEN AND CODEINE PHOSPHATE 120; 12 MG/5ML; MG/5ML
0.35 SOLUTION ORAL DAILY
Qty: 84 TABLET | Refills: 0 | Status: SHIPPED | OUTPATIENT
Start: 2021-04-28 | End: 2021-05-10

## 2021-04-28 RX ORDER — DESOGESTREL AND ETHINYL ESTRADIOL 0.15-0.03
1 KIT ORAL DAILY
Qty: 84 TABLET | Refills: 0 | Status: SHIPPED | OUTPATIENT
Start: 2021-04-28 | End: 2022-02-14

## 2021-04-28 ASSESSMENT — MIFFLIN-ST. JEOR: SCORE: 1408.07

## 2021-04-28 ASSESSMENT — PAIN SCALES - GENERAL: PAINLEVEL: NO PAIN (0)

## 2021-04-28 NOTE — NURSING NOTE
"Chief Complaint   Patient presents with     Consult     discuss birth control options, pap     Patient presents to renew birth control and for a pap.  Initial /82 (BP Location: Right arm, Patient Position: Sitting, Cuff Size: Adult Regular)   Pulse 88   Resp 18   Ht 1.575 m (5' 2\")   Wt 74 kg (163 lb 1.6 oz)   LMP 03/27/2021 (Approximate)   SpO2 98%   BMI 29.83 kg/m   Estimated body mass index is 29.83 kg/m  as calculated from the following:    Height as of this encounter: 1.575 m (5' 2\").    Weight as of this encounter: 74 kg (163 lb 1.6 oz).  Medication Reconciliation: complete    JE STEWART, LPN  "
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
## 2021-05-08 ENCOUNTER — MYC REFILL (OUTPATIENT)
Dept: ENDOCRINOLOGY | Facility: CLINIC | Age: 31
End: 2021-05-08

## 2021-05-08 DIAGNOSIS — E66.811 OBESITY, CLASS I, BMI 30-34.9: ICD-10-CM

## 2021-05-08 RX ORDER — TOPIRAMATE 25 MG/1
75 TABLET, FILM COATED ORAL AT BEDTIME
Qty: 90 TABLET | Refills: 0 | Status: CANCELLED | OUTPATIENT
Start: 2021-05-08

## 2021-05-10 ENCOUNTER — OFFICE VISIT (OUTPATIENT)
Dept: FAMILY MEDICINE | Facility: OTHER | Age: 31
End: 2021-05-10
Attending: PHYSICIAN ASSISTANT
Payer: COMMERCIAL

## 2021-05-10 ENCOUNTER — TELEPHONE (OUTPATIENT)
Dept: PEDIATRICS | Facility: CLINIC | Age: 31
End: 2021-05-10

## 2021-05-10 ENCOUNTER — MYC MEDICAL ADVICE (OUTPATIENT)
Dept: PEDIATRICS | Facility: CLINIC | Age: 31
End: 2021-05-10

## 2021-05-10 VITALS
RESPIRATION RATE: 18 BRPM | HEART RATE: 118 BPM | OXYGEN SATURATION: 98 % | HEIGHT: 62 IN | WEIGHT: 162.38 LBS | DIASTOLIC BLOOD PRESSURE: 76 MMHG | SYSTOLIC BLOOD PRESSURE: 130 MMHG | BODY MASS INDEX: 29.88 KG/M2 | TEMPERATURE: 99.6 F

## 2021-05-10 DIAGNOSIS — R00.2 PALPITATIONS: Primary | ICD-10-CM

## 2021-05-10 DIAGNOSIS — E66.811 OBESITY, CLASS I, BMI 30-34.9: ICD-10-CM

## 2021-05-10 DIAGNOSIS — F41.8 DEPRESSION WITH ANXIETY: ICD-10-CM

## 2021-05-10 PROCEDURE — 99213 OFFICE O/P EST LOW 20 MIN: CPT | Performed by: PHYSICIAN ASSISTANT

## 2021-05-10 PROCEDURE — 93005 ELECTROCARDIOGRAM TRACING: CPT

## 2021-05-10 PROCEDURE — G0463 HOSPITAL OUTPT CLINIC VISIT: HCPCS

## 2021-05-10 PROCEDURE — 93010 ELECTROCARDIOGRAM REPORT: CPT | Performed by: INTERNAL MEDICINE

## 2021-05-10 RX ORDER — TOPIRAMATE 25 MG/1
75 TABLET, FILM COATED ORAL AT BEDTIME
Qty: 90 TABLET | Refills: 0 | Status: SHIPPED | OUTPATIENT
Start: 2021-05-10 | End: 2021-08-03

## 2021-05-10 ASSESSMENT — MIFFLIN-ST. JEOR: SCORE: 1404.78

## 2021-05-10 ASSESSMENT — PAIN SCALES - GENERAL: PAINLEVEL: NO PAIN (0)

## 2021-05-10 NOTE — LETTER
May 13, 2021      Alba Lee  [address] [address]        Dear Alba,       We care about your health and have reviewed your health plan including your medical conditions, medications, and lab results.  Based on this review, it is recommended that you follow up regarding the following health topic(s):  -Depression  -Cervical Cancer Screening  -Wellness (Physical) Visit     We recommend you take the following action(s):  -schedule a WELLNESS (Physical) APPOINTMENT.  We will perform the following labs: pap smear.  -Complete and return the attached PHQ-9 Form.  If your total score is greater than 9, please schedule a followup appointment.  If you answer Yes to question 9, call your clinic between the hours of 8 to 5.  You may also call the [employer] Hotline at [phone] ([phone] any time.     You can call our office and give the answers to the attached questionnaire to a nurse over the phone. Or you can complete this questionnaire in the My Chart message we sent you.  You can also drop the questionnaire off at the clinic or mail it back to us.  We DO need this information in your medical chart.    Please call us at the [facility] - [phone] (or use [employer] (now [employer])) to address the above recommendations.     Thank you for trusting [facility] and we appreciate the opportunity to serve you.  We look forward to supporting your healthcare needs in the future.    Healthy Regards,    Your Health Care Team  [facility]

## 2021-05-10 NOTE — TELEPHONE ENCOUNTER
Request from patient for Topiramate. Sending 1 month supply to pharmacy. Advised patient to schedule an appointment with Radha Alvarado.
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
## 2021-05-10 NOTE — TELEPHONE ENCOUNTER
Patient Quality Outreach      Summary:    Patient has the following on her problem list/HM:   Depression / Dysthymia review  ANXIETY  6 Month Remission: 4-8 month window range:   12 Month Remission: 10-14 month window range:   PHQ-9 SCORE 1/5/2021 1/5/2021   PHQ-9 Total Score MyChart - 11 (Moderate depression)   PHQ-9 Total Score 9 11   If PHQ-9 recheck is 5 or more, route to provider for next steps.      Patient is due/failing the following:   Cervical Cancer Screening - PAP Needed, Adult/Adolescent physical, date due: never done and Immunizations, PHQ9 due in July    Type of outreach:    Sent [employer] message.    Questions for provider review:    None                                              Karyn Crowley CMA    Chart routed to Care Team.

## 2021-05-10 NOTE — NURSING NOTE
Patient presents to clinic experiencing heart palpitations.  This has happened randomly since yesterday.  Medication Reconciliation: complete    Mariana Looney LPN
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
## 2021-05-13 ENCOUNTER — OFFICE VISIT (OUTPATIENT)
Dept: FAMILY MEDICINE | Facility: OTHER | Age: 31
End: 2021-05-13
Attending: PHYSICIAN ASSISTANT
Payer: COMMERCIAL

## 2021-05-13 VITALS
DIASTOLIC BLOOD PRESSURE: 64 MMHG | OXYGEN SATURATION: 100 % | HEIGHT: 62 IN | SYSTOLIC BLOOD PRESSURE: 124 MMHG | HEART RATE: 104 BPM | RESPIRATION RATE: 14 BRPM | WEIGHT: 162 LBS | BODY MASS INDEX: 29.81 KG/M2 | TEMPERATURE: 98.1 F

## 2021-05-13 DIAGNOSIS — R00.2 PALPITATIONS: Primary | ICD-10-CM

## 2021-05-13 DIAGNOSIS — E55.9 VITAMIN D DEFICIENCY: ICD-10-CM

## 2021-05-13 PROBLEM — L40.9 PSORIASIS: Status: ACTIVE | Noted: 2021-05-13

## 2021-05-13 LAB
ALBUMIN SERPL-MCNC: 4.2 G/DL (ref 3.5–5.7)
ALP SERPL-CCNC: 56 U/L (ref 34–104)
ALT SERPL W P-5'-P-CCNC: 11 U/L (ref 7–52)
ANION GAP SERPL CALCULATED.3IONS-SCNC: 7 MMOL/L (ref 3–14)
AST SERPL W P-5'-P-CCNC: 13 U/L (ref 13–39)
BILIRUB SERPL-MCNC: 0.4 MG/DL (ref 0.3–1)
BUN SERPL-MCNC: 14 MG/DL (ref 7–25)
CALCIUM SERPL-MCNC: 8.9 MG/DL (ref 8.6–10.3)
CHLORIDE SERPL-SCNC: 108 MMOL/L (ref 98–107)
CO2 SERPL-SCNC: 23 MMOL/L (ref 21–31)
CREAT SERPL-MCNC: 0.75 MG/DL (ref 0.6–1.2)
DEPRECATED CALCIDIOL+CALCIFEROL SERPL-MC: 49 NG/ML
ERYTHROCYTE [DISTWIDTH] IN BLOOD BY AUTOMATED COUNT: 12.4 % (ref 10–15)
GFR SERPL CREATININE-BSD FRML MDRD: >90 ML/MIN/{1.73_M2}
GLUCOSE SERPL-MCNC: 100 MG/DL (ref 70–105)
HCT VFR BLD AUTO: 37.3 % (ref 35–47)
HGB BLD-MCNC: 12.4 G/DL (ref 11.7–15.7)
MAGNESIUM SERPL-MCNC: 2 MG/DL (ref 1.9–2.7)
MCH RBC QN AUTO: 28.8 PG (ref 26.5–33)
MCHC RBC AUTO-ENTMCNC: 33.2 G/DL (ref 31.5–36.5)
MCV RBC AUTO: 87 FL (ref 78–100)
PLATELET # BLD AUTO: 244 10E9/L (ref 150–450)
POTASSIUM SERPL-SCNC: 3.4 MMOL/L (ref 3.5–5.1)
PROT SERPL-MCNC: 6.9 G/DL (ref 6.4–8.9)
RBC # BLD AUTO: 4.3 10E12/L (ref 3.8–5.2)
SODIUM SERPL-SCNC: 138 MMOL/L (ref 134–144)
TSH SERPL DL<=0.05 MIU/L-ACNC: 1.57 IU/ML (ref 0.34–5.6)
WBC # BLD AUTO: 7.6 10E9/L (ref 4–11)

## 2021-05-13 PROCEDURE — 99213 OFFICE O/P EST LOW 20 MIN: CPT | Performed by: PHYSICIAN ASSISTANT

## 2021-05-13 PROCEDURE — 83735 ASSAY OF MAGNESIUM: CPT | Mod: ZL | Performed by: PHYSICIAN ASSISTANT

## 2021-05-13 PROCEDURE — 84443 ASSAY THYROID STIM HORMONE: CPT | Mod: ZL | Performed by: PHYSICIAN ASSISTANT

## 2021-05-13 PROCEDURE — G0463 HOSPITAL OUTPT CLINIC VISIT: HCPCS

## 2021-05-13 PROCEDURE — 80053 COMPREHEN METABOLIC PANEL: CPT | Mod: ZL | Performed by: PHYSICIAN ASSISTANT

## 2021-05-13 PROCEDURE — 36415 COLL VENOUS BLD VENIPUNCTURE: CPT | Mod: ZL | Performed by: PHYSICIAN ASSISTANT

## 2021-05-13 PROCEDURE — 93005 ELECTROCARDIOGRAM TRACING: CPT

## 2021-05-13 PROCEDURE — 85027 COMPLETE CBC AUTOMATED: CPT | Mod: ZL | Performed by: PHYSICIAN ASSISTANT

## 2021-05-13 PROCEDURE — 93010 ELECTROCARDIOGRAM REPORT: CPT | Performed by: INTERNAL MEDICINE

## 2021-05-13 PROCEDURE — 82306 VITAMIN D 25 HYDROXY: CPT | Mod: ZL | Performed by: PHYSICIAN ASSISTANT

## 2021-05-13 ASSESSMENT — ANXIETY QUESTIONNAIRES
1. FEELING NERVOUS, ANXIOUS, OR ON EDGE: NOT AT ALL
6. BECOMING EASILY ANNOYED OR IRRITABLE: NOT AT ALL
IF YOU CHECKED OFF ANY PROBLEMS ON THIS QUESTIONNAIRE, HOW DIFFICULT HAVE THESE PROBLEMS MADE IT FOR YOU TO DO YOUR WORK, TAKE CARE OF THINGS AT HOME, OR GET ALONG WITH OTHER PEOPLE: NOT DIFFICULT AT ALL
5. BEING SO RESTLESS THAT IT IS HARD TO SIT STILL: NOT AT ALL
7. FEELING AFRAID AS IF SOMETHING AWFUL MIGHT HAPPEN: NOT AT ALL
2. NOT BEING ABLE TO STOP OR CONTROL WORRYING: NOT AT ALL
GAD7 TOTAL SCORE: 0
3. WORRYING TOO MUCH ABOUT DIFFERENT THINGS: NOT AT ALL

## 2021-05-13 ASSESSMENT — PATIENT HEALTH QUESTIONNAIRE - PHQ9: 5. POOR APPETITE OR OVEREATING: NOT AT ALL

## 2021-05-13 ASSESSMENT — MIFFLIN-ST. JEOR: SCORE: 1403.08

## 2021-05-13 ASSESSMENT — PAIN SCALES - GENERAL: PAINLEVEL: NO PAIN (0)

## 2021-05-13 NOTE — TELEPHONE ENCOUNTER
Patient Quality Outreach 2nd Attempt    Summary:    Type of outreach:    Sent letter.+ PHQ9/DOMINIC    Next Steps:  Reach out within 90 days via Phone.    Max number of attempts reached: No. Will try again in 90 days if patient still on fail list.    Questions for provider review:    None                                                                                                                    Karyn Crowley CMA    Chart routed to Care Team.

## 2021-05-13 NOTE — TELEPHONE ENCOUNTER
My CHart received from patient.  Patient has moved to [facility] [facility] care at [place].  Karyn Crowley, CMA

## 2021-05-13 NOTE — TELEPHONE ENCOUNTER
Message received - patient has moved to [facility] - declines further care at [facility].  Karyn Crowley, CMA

## 2021-05-13 NOTE — NURSING NOTE
Patient presents to clinic for follow up. She is still having issues with rapid heart beat.  Roro Mcmullen LPN ....................  5/13/2021   3:58 PM

## 2021-05-14 ASSESSMENT — ANXIETY QUESTIONNAIRES: GAD7 TOTAL SCORE: 0

## 2021-05-16 ENCOUNTER — MYC MEDICAL ADVICE (OUTPATIENT)
Dept: FAMILY MEDICINE | Facility: OTHER | Age: 31
End: 2021-05-16

## 2021-05-16 DIAGNOSIS — R53.83 FATIGUE, UNSPECIFIED TYPE: ICD-10-CM

## 2021-05-16 DIAGNOSIS — R00.2 PALPITATIONS: Primary | ICD-10-CM

## 2021-05-17 LAB — INTERPRETATION ECG - MUSE: NORMAL

## 2021-05-18 LAB — INTERPRETATION ECG - MUSE: NORMAL

## 2021-05-19 ENCOUNTER — MYC MEDICAL ADVICE (OUTPATIENT)
Dept: FAMILY MEDICINE | Facility: OTHER | Age: 31
End: 2021-05-19

## 2021-05-25 ENCOUNTER — MYC MEDICAL ADVICE (OUTPATIENT)
Dept: FAMILY MEDICINE | Facility: OTHER | Age: 31
End: 2021-05-25

## 2021-07-03 ENCOUNTER — MYC MEDICAL ADVICE (OUTPATIENT)
Dept: FAMILY MEDICINE | Facility: OTHER | Age: 31
End: 2021-07-03

## 2021-07-13 ENCOUNTER — TELEPHONE (OUTPATIENT)
Dept: FAMILY MEDICINE | Facility: OTHER | Age: 31
End: 2021-07-13

## 2021-07-13 DIAGNOSIS — R00.2 PALPITATIONS: Primary | ICD-10-CM

## 2021-07-13 NOTE — TELEPHONE ENCOUNTER
New orders for Zio Patch placed for additional evaluation of palpitations.     Renay Castorena PA-C on 7/13/2021 at 1:37 PM

## 2021-07-13 NOTE — TELEPHONE ENCOUNTER
Patient called to schedule Echocardiogram and Ziopatch placement. These orders were placed back in May 2021 and the order for the Ziopatch has now . Would [facility] like to place new order for patient. Patient states that her symptoms had gone away and she thought the medication was helping but her symptoms are now back. Please call patient to follow up with.     Narda Chen on 2021 at 1:30 PM

## 2021-07-14 ENCOUNTER — HOSPITAL ENCOUNTER (OUTPATIENT)
Dept: CARDIOLOGY | Facility: OTHER | Age: 31
Discharge: HOME | End: 2021-07-14
Attending: PHYSICIAN ASSISTANT | Admitting: PHYSICIAN ASSISTANT
Payer: COMMERCIAL

## 2021-07-14 DIAGNOSIS — R00.2 PALPITATIONS: ICD-10-CM

## 2021-07-14 PROCEDURE — 93246 EXT ECG>7D<15D RECORDING: CPT

## 2021-07-14 PROCEDURE — 93248 EXT ECG>7D<15D REV&INTERPJ: CPT | Performed by: INTERNAL MEDICINE

## 2021-07-14 PROCEDURE — 999N000157 HC STATISTIC RCP TIME EA 10 MIN

## 2021-07-14 NOTE — PATIENT INSTRUCTIONS
Date Placed on Pt:  07/14/2021    Patient instructed not to:   -take baths, swim, sauna   -remove device prior to 10 days   -use electric blankets   -shower or sweat excessively within first 24 hours of device application  Patient instructed to:   -shower as needed   -be carefull when toweling off and dressing   -press button when cardiac symptoms occur   -document symptoms in log book   -remove and return device (send via mail to [employer])   -Call [email or web address] with any questions

## 2021-08-03 ENCOUNTER — VIRTUAL VISIT (OUTPATIENT)
Dept: ENDOCRINOLOGY | Facility: CLINIC | Age: 31
End: 2021-08-03
Payer: COMMERCIAL

## 2021-08-03 ENCOUNTER — MYC MEDICAL ADVICE (OUTPATIENT)
Dept: FAMILY MEDICINE | Facility: OTHER | Age: 31
End: 2021-08-03

## 2021-08-03 VITALS — BODY MASS INDEX: 30.73 KG/M2 | WEIGHT: 167 LBS | HEIGHT: 62 IN

## 2021-08-03 DIAGNOSIS — E66.811 OBESITY, CLASS I, BMI 30-34.9: Primary | ICD-10-CM

## 2021-08-03 DIAGNOSIS — F41.8 DEPRESSION WITH ANXIETY: ICD-10-CM

## 2021-08-03 PROCEDURE — 99215 OFFICE O/P EST HI 40 MIN: CPT | Mod: 95 | Performed by: NURSE PRACTITIONER

## 2021-08-03 ASSESSMENT — MIFFLIN-ST. JEOR: SCORE: 1425.76

## 2021-08-03 ASSESSMENT — PAIN SCALES - GENERAL: PAINLEVEL: NO PAIN (0)

## 2021-08-03 NOTE — NURSING NOTE
"  Chief Complaint   Patient presents with     Follow Up     [facility] follow up       Vitals:    08/03/21 1740   Weight: 75.8 kg (167 lb)   Height: 1.575 m (5' 2\")       Body mass index is 30.54 kg/m .                         "

## 2021-08-03 NOTE — PROGRESS NOTES
Return Medical Weight Management Note     Alba Lee  MRN:  [id number]  :  1990  LUNA:  8/3/2021    Dear Renay Castorena PA-C,    I had the pleasure of seeing your patient Alba Lee. She is a 30 year old female who I am continuing to see for treatment of obesity related to:       2021   I have the following health issues associated with obesity: None of the above   I have the following symptoms associated with obesity: Depression, Fatigue       Assessment & Plan   Problem List Items Addressed This Visit        Other    Obesity, Class I, BMI 30-34.9 - Primary     Stopped topiramate due to palpitations. Significant improvement since stopping topiramate but does have occasional palpitations still. Zio patch completed recently, doesn't have results yet. Breathing difficulties improved off topiramate.     Weight regain since being off topiramate. Loss of sense of control, increased hunger, sense of urgency around food. Increased anxiety and potentially depression notable in conversation. Patient questions ADHD or OCD. Will do PHQ and GAD7 today. Loss of control is negatively impacting mental health, to what extent is uncertain. Discussed which to address first vs addressing at the same time.     Discussed possibility of contrave to have similar support in weight loss - brain hunger, cravings, emotional/ stress eating. Feels like she was on wellbutrin when younger but doesn't remember. Risk for increased anxiety while taking this but would be better covered by insurance if no AOM coverage. She will look into AOM coverage.     Consider Wegovy- semaglutide for weight loss. Will look at AOM coverage. Discussed risks benefits and side effects. Less mental health impact so should allow her to gain more control around hunger/ eating without negatively impacting mental health. If no AOM coverage will be very expensive.     Stressed working with therapist, will talk to primary about referral  closer to home   Has follow up with primary care to discuss mental health further                 48 minutes spent on the date of the encounter doing chart review, history and exam, documentation and further activities per the note  0956}  MEDICATIONS:        - Continue other medications without change       - considering Wegovy (semaglutide) or Contrave (Wellbutrin/ naltrexone)  CONSULTATION/REFERRAL to -mental health provider- going to talk to primary closer to home  FUTURE APPOINTMENTS:       - Follow-up visit in 2 months   Depression/ Anxiety screening today       INTERVAL HISTORY:  Last seen 3/22/2021 - continued topiramate 75mg at bedtime and had already lost 10% TBW. Started having palpitations 5/2021, felt to be related to topiramate, symptoms have resolved since stopping medication.     Today:  Now experiencing increased fatigue/ sluggish, feeling like she did before she took topiramate. Lack of interest in previously enjoyable activities like cooking. Doesn't feel like previous depression but that was when she was a teen.      -increased life stressors- family drama; neglects self when worrying about others   -increased anxiety, difficulty focusing on tasks, always worrying about something, feels like mental health disrupting weight goals   -increased fast food/ eating out  -less control over hunger/ eating off of topiramate, urgency to eat  -can't stop eating when full, will eat all that she makes, feels out of control and then feels uncomfortable after she is done   -frustrated because she knows she could be doing better     CURRENT WEIGHT:   167 lbs 0 oz    Initial Weight (lbs): 182 lbs  Last Visits Weight: 73.5 kg (162 lb 0.1 oz)  Cumulative weight loss (lbs): 15  Weight Loss Percentage: 8.24%    Changes and Difficulties 8/3/2021   I have made the following changes to my diet since my last visit: worse   With regards to my diet, I am still struggling with: more hungry after stopping the Topiramate   I  "have made the following changes to my activity/exercise since my last visit: same   With regards to my activity/exercise, I am still struggling with: sleeping more, anxiety       VITALS:  Ht 1.575 m (5' 2\")   Wt 75.8 kg (167 lb)   BMI 30.54 kg/m      MEDICATIONS:   Current Outpatient Medications   Medication Sig Dispense Refill     desogestrel-ethinyl estradiol (APRI) 0.15-30 MG-MCG tablet Take 1 tablet by mouth daily 84 tablet 0     vitamin D3 (CHOLECALCIFEROL) 1.25 MG (82500 UT) capsule Take 1 capsule (50,000 Units) by mouth every 7 days 12 capsule 0       Weight Loss Medication History Reviewed With Patient 8/3/2021   Which weight loss medications are you currently taking on a regular basis?  None   If you are not taking a weight loss medication that was prescribed to you, please indicate why: I did not like the side effects   Are you having any side effects from the weight loss medication that we have prescribed you? Yes   If you are having side effects please describe: heart palpitations, racing heart     PHQ 1/5/2021 1/5/2021   PHQ-9 Total Score 9 11   Q9: Thoughts of better off dead/self-harm past 2 weeks Not at all Not at all      DOMINIC-7 SCORE 1/5/2021 5/13/2021   Total Score 10 (moderate anxiety) -   Total Score 10 0         Office Visit on 05/13/2021   Component Date Value Ref Range Status     Interpretation ECG 05/13/2021 Click View Image link to view waveform and result   Final-Edited     Vitamin D Total 05/13/2021 49.0  ng/mL Final    Comment: Comments:  Deficiency:             <10 ng/mL  Insufficiency:        10-29 ng/mL  Sufficiency:          ng/mL  Possible Toxicity:     >100 ng/mL       Magnesium 05/13/2021 2.0  1.9 - 2.7 mg/dL Final     TSH Reflex 05/13/2021 1.57  0.34 - 5.60 IU/mL Final     Sodium 05/13/2021 138  134 - 144 mmol/L Final     Potassium 05/13/2021 3.4* 3.5 - 5.1 mmol/L Final     Chloride 05/13/2021 108* 98 - 107 mmol/L Final     Carbon Dioxide 05/13/2021 23  21 - 31 mmol/L " "Final     Anion Gap 05/13/2021 7  3 - 14 mmol/L Final     Glucose 05/13/2021 100  70 - 105 mg/dL Final     Urea Nitrogen 05/13/2021 14  7 - 25 mg/dL Final     Creatinine 05/13/2021 0.75  0.60 - 1.20 mg/dL Final     GFR Estimate 05/13/2021 >90  >60 mL/min/[1.73_m2] Final     GFR Estimate If Black 05/13/2021 >90  >60 mL/min/[1.73_m2] Final     Calcium 05/13/2021 8.9  8.6 - 10.3 mg/dL Final     Bilirubin Total 05/13/2021 0.4  0.3 - 1.0 mg/dL Final     Albumin 05/13/2021 4.2  3.5 - 5.7 g/dL Final     Protein Total 05/13/2021 6.9  6.4 - 8.9 g/dL Final     Alkaline Phosphatase 05/13/2021 56  34 - 104 U/L Final     ALT 05/13/2021 11  7 - 52 U/L Final     AST 05/13/2021 13  13 - 39 U/L Final     WBC 05/13/2021 7.6  4.0 - 11.0 10e9/L Final     RBC Count 05/13/2021 4.30  3.8 - 5.2 10e12/L Final     Hemoglobin 05/13/2021 12.4  11.7 - 15.7 g/dL Final     Hematocrit 05/13/2021 37.3  35.0 - 47.0 % Final     MCV 05/13/2021 87  78 - 100 fl Final     MCH 05/13/2021 28.8  26.5 - 33.0 pg Final     MCHC 05/13/2021 33.2  31.5 - 36.5 g/dL Final     RDW 05/13/2021 12.4  10.0 - 15.0 % Final     Platelet Count 05/13/2021 244  150 - 450 10e9/L Final       PHYSICAL EXAM:  Objective    Ht 1.575 m (5' 2\")   Wt 75.8 kg (167 lb)   BMI 30.54 kg/m    Vitals - Patient Reported  Pain Score: No Pain (0)        Physical Exam   GENERAL: Healthy, alert and no distress  EYES: Eyes grossly normal to inspection.  No discharge or erythema, or obvious scleral/conjunctival abnormalities.  RESP: No audible wheeze, cough, or visible cyanosis.  No visible retractions or increased work of breathing.    SKIN: Visible skin clear. No significant rash, abnormal pigmentation or lesions.  NEURO: Cranial nerves grossly intact.  Mentation and speech appropriate for age.  PSYCH: Mentation appears normal, affect normal/bright, judgement and insight intact, normal speech and appearance well-groomed.      Sincerely,    Radha Alvarado NP  "

## 2021-08-03 NOTE — LETTER
8/3/2021       RE: lAba Lee  [address]  [facility] [address]     Dear Colleague,    Thank you for referring your patient, Alba Lee, to the [facility] [place] at [facility]. Please see a copy of my visit note below.    Return Medical Weight Management Note     Alba Lee  MRN:  [id number]  :  1990  LUNA:  8/3/2021    Dear Renay Castorena PA-C,    I had the pleasure of seeing your patient Alba Lee. She is a 30 year old female who I am continuing to see for treatment of obesity related to:       2021   I have the following health issues associated with obesity: None of the above   I have the following symptoms associated with obesity: Depression, Fatigue       Assessment & Plan   Problem List Items Addressed This Visit        Other    Obesity, Class I, BMI 30-34.9 - Primary     Stopped topiramate due to palpitations. Significant improvement since stopping topiramate but does have occasional palpitations still. Zio patch completed recently, doesn't have results yet. Breathing difficulties improved off topiramate.     Weight regain since being off topiramate. Loss of sense of control, increased hunger, sense of urgency around food. Increased anxiety and potentially depression notable in conversation. Patient questions ADHD or OCD. Will do PHQ and GAD7 today. Loss of control is negatively impacting mental health, to what extent is uncertain. Discussed which to address first vs addressing at the same time.     Discussed possibility of contrave to have similar support in weight loss - brain hunger, cravings, emotional/ stress eating. Feels like she was on wellbutrin when younger but doesn't remember. Risk for increased anxiety while taking this but would be better covered by insurance if no AOM coverage. She will look into AOM coverage.     Consider Wegovy- semaglutide  for weight loss. Will look at AOM coverage. Discussed risks benefits and side effects. Less mental health impact so should allow her to gain more control around hunger/ eating without negatively impacting mental health. If no AOM coverage will be very expensive.     Stressed working with therapist, will talk to primary about referral closer to home   Has follow up with primary care to discuss mental health further                 48 minutes spent on the date of the encounter doing chart review, history and exam, documentation and further activities per the note  0956}  MEDICATIONS:        - Continue other medications without change       - considering Wegovy (semaglutide) or Contrave (Wellbutrin/ naltrexone)  CONSULTATION/REFERRAL to -mental health provider- going to talk to primary closer to home  FUTURE APPOINTMENTS:       - Follow-up visit in 2 months   Depression/ Anxiety screening today       INTERVAL HISTORY:  Last seen 3/22/2021 - continued topiramate 75mg at bedtime and had already lost 10% TBW. Started having palpitations 5/2021, felt to be related to topiramate, symptoms have resolved since stopping medication.     Today:  Now experiencing increased fatigue/ sluggish, feeling like she did before she took topiramate. Lack of interest in previously enjoyable activities like cooking. Doesn't feel like previous depression but that was when she was a teen.      -increased life stressors- family drama; neglects self when worrying about others   -increased anxiety, difficulty focusing on tasks, always worrying about something, feels like mental health disrupting weight goals   -increased fast food/ eating out  -less control over hunger/ eating off of topiramate, urgency to eat  -can't stop eating when full, will eat all that she makes, feels out of control and then feels uncomfortable after she is done   -frustrated because she knows she could be doing better     CURRENT WEIGHT:   167 lbs 0 oz    Initial Weight  "(lbs): 182 lbs  Last Visits Weight: 73.5 kg (162 lb 0.1 oz)  Cumulative weight loss (lbs): 15  Weight Loss Percentage: 8.24%    Changes and Difficulties 8/3/2021   I have made the following changes to my diet since my last visit: worse   With regards to my diet, I am still struggling with: more hungry after stopping the Topiramate   I have made the following changes to my activity/exercise since my last visit: same   With regards to my activity/exercise, I am still struggling with: sleeping more, anxiety       VITALS:  Ht 1.575 m (5' 2\")   Wt 75.8 kg (167 lb)   BMI 30.54 kg/m      MEDICATIONS:   Current Outpatient Medications   Medication Sig Dispense Refill     desogestrel-ethinyl estradiol (APRI) 0.15-30 MG-MCG tablet Take 1 tablet by mouth daily 84 tablet 0     vitamin D3 (CHOLECALCIFEROL) 1.25 MG (56295 UT) capsule Take 1 capsule (50,000 Units) by mouth every 7 days 12 capsule 0       Weight Loss Medication History Reviewed With Patient 8/3/2021   Which weight loss medications are you currently taking on a regular basis?  None   If you are not taking a weight loss medication that was prescribed to you, please indicate why: I did not like the side effects   Are you having any side effects from the weight loss medication that we have prescribed you? Yes   If you are having side effects please describe: heart palpitations, racing heart     PHQ 1/5/2021 1/5/2021   PHQ-9 Total Score 9 11   Q9: Thoughts of better off dead/self-harm past 2 weeks Not at all Not at all      DOMINIC-7 SCORE 1/5/2021 5/13/2021   Total Score 10 (moderate anxiety) -   Total Score 10 0         Office Visit on 05/13/2021   Component Date Value Ref Range Status     Interpretation ECG 05/13/2021 Click View Image link to view waveform and result   Final-Edited     Vitamin D Total 05/13/2021 49.0  ng/mL Final    Comment: Comments:  Deficiency:             <10 ng/mL  Insufficiency:        10-29 ng/mL  Sufficiency:          ng/mL  Possible " "Toxicity:     >100 ng/mL       Magnesium 05/13/2021 2.0  1.9 - 2.7 mg/dL Final     TSH Reflex 05/13/2021 1.57  0.34 - 5.60 IU/mL Final     Sodium 05/13/2021 138  134 - 144 mmol/L Final     Potassium 05/13/2021 3.4* 3.5 - 5.1 mmol/L Final     Chloride 05/13/2021 108* 98 - 107 mmol/L Final     Carbon Dioxide 05/13/2021 23  21 - 31 mmol/L Final     Anion Gap 05/13/2021 7  3 - 14 mmol/L Final     Glucose 05/13/2021 100  70 - 105 mg/dL Final     Urea Nitrogen 05/13/2021 14  7 - 25 mg/dL Final     Creatinine 05/13/2021 0.75  0.60 - 1.20 mg/dL Final     GFR Estimate 05/13/2021 >90  >60 mL/min/[1.73_m2] Final     GFR Estimate If Black 05/13/2021 >90  >60 mL/min/[1.73_m2] Final     Calcium 05/13/2021 8.9  8.6 - 10.3 mg/dL Final     Bilirubin Total 05/13/2021 0.4  0.3 - 1.0 mg/dL Final     Albumin 05/13/2021 4.2  3.5 - 5.7 g/dL Final     Protein Total 05/13/2021 6.9  6.4 - 8.9 g/dL Final     Alkaline Phosphatase 05/13/2021 56  34 - 104 U/L Final     ALT 05/13/2021 11  7 - 52 U/L Final     AST 05/13/2021 13  13 - 39 U/L Final     WBC 05/13/2021 7.6  4.0 - 11.0 10e9/L Final     RBC Count 05/13/2021 4.30  3.8 - 5.2 10e12/L Final     Hemoglobin 05/13/2021 12.4  11.7 - 15.7 g/dL Final     Hematocrit 05/13/2021 37.3  35.0 - 47.0 % Final     MCV 05/13/2021 87  78 - 100 fl Final     MCH 05/13/2021 28.8  26.5 - 33.0 pg Final     MCHC 05/13/2021 33.2  31.5 - 36.5 g/dL Final     RDW 05/13/2021 12.4  10.0 - 15.0 % Final     Platelet Count 05/13/2021 244  150 - 450 10e9/L Final       PHYSICAL EXAM:  Objective    Ht 1.575 m (5' 2\")   Wt 75.8 kg (167 lb)   BMI 30.54 kg/m    Vitals - Patient Reported  Pain Score: No Pain (0)        Physical Exam   GENERAL: Healthy, alert and no distress  EYES: Eyes grossly normal to inspection.  No discharge or erythema, or obvious scleral/conjunctival abnormalities.  RESP: No audible wheeze, cough, or visible cyanosis.  No visible retractions or increased work of breathing.    SKIN: Visible skin clear. No " significant rash, abnormal pigmentation or lesions.  NEURO: Cranial nerves grossly intact.  Mentation and speech appropriate for age.  PSYCH: Mentation appears normal, affect normal/bright, judgement and insight intact, normal speech and appearance well-groomed.      Sincerely,    Radha Alvarado NP    Alba is a 30 year old who is being evaluated via a billable video visit.      How would you like to obtain your AVS? MyChart  If the video visit is dropped, the invitation should be resent by: Send to e-mail at: [email or web address]  Will anyone else be joining your video visit? No       During this virtual visit the patient is located in [place], patient verifies this as the location during the entirety of this visit.       Video Start Time: 6:30pm  Video-Visit Details    Type of service:  Video Visit    Video End Time:7:03pm    Originating Location (pt. Location): Home    Distant Location (provider location):  [facility] [place]     Platform used for Video Visit: [employer]

## 2021-08-04 ENCOUNTER — TELEPHONE (OUTPATIENT)
Dept: ENDOCRINOLOGY | Facility: CLINIC | Age: 31
End: 2021-08-04

## 2021-08-04 NOTE — ASSESSMENT & PLAN NOTE
Stopped topiramate due to palpitations. Significant improvement since stopping topiramate but does have occasional palpitations still. Zio patch completed recently, doesn't have results yet. Breathing difficulties improved off topiramate.     Weight regain since being off topiramate. Loss of sense of control, increased hunger, sense of urgency around food. Increased anxiety and potentially depression notable in conversation. Patient questions ADHD or OCD. Will do PHQ and GAD7 today. Loss of control is negatively impacting mental health, to what extent is uncertain. Discussed which to address first vs addressing at the same time.     Discussed possibility of contrave to have similar support in weight loss - brain hunger, cravings, emotional/ stress eating. Feels like she was on wellbutrin when younger but doesn't remember. Risk for increased anxiety while taking this but would be better covered by insurance if no AOM coverage. She will look into AOM coverage.     Consider Wegovy- semaglutide for weight loss. Will look at AOM coverage. Discussed risks benefits and side effects. Less mental health impact so should allow her to gain more control around hunger/ eating without negatively impacting mental health. If no AOM coverage will be very expensive.     Stressed working with therapist, will talk to primary about referral closer to home   Has follow up with primary care to discuss mental health further

## 2021-08-04 NOTE — PATIENT INSTRUCTIONS
"Thank you for allowing us the privilege of caring for you. We hope we provided you with the excellent service you deserve.   Please let us know if there is anything else we can do for you so that we can be sure you are completely satisfied with your care experience.    To ensure the quality of our services you may be receiving a patient satisfaction survey from an independent patient satisfaction monitoring company.    The greatest compliment you can give is a \"Likely to Recommend\"    Your visit was with Radha Alvarado NP today.    Instructions per today's visit:     Pipe Lee, it was great to visit with you today.  Here is a review of our visit.  If our clinic scheduler is not able to reach you please call [phone] to schedule your next appointments.    MEDICATIONS:        - Continue other medications without change       - considering Wegovy (semaglutide) or Contrave (Wellbutrin/ naltrexone)  CONSULTATION/REFERRAL to -mental health provider- going to talk to primary closer to home  FUTURE APPOINTMENTS:       - Follow-up visit in 2 months   Depression/ Anxiety screening today   Consider working with dietitian       Information about Video Visits with [employer] [place]: video visit information  _________________________________________________________________________________________________________________________________________________________  Important contact and scheduling information:  Please call our contact center at [phone] to schedule your next appointments.  To find a lab location near you, please call [phone].  For any nursing questions or concerns call Atiya Jamil LPN at [phone] or Karyn Kennedy RN at [phone]  Please call during clinic hours Monday through Friday 8:00a - 4:00p if you have questions or you can contact us via Work in Field at anytime and we will reply during clinic hours.    Lab results will be communicated through My Chart or letter (if My Chart not " used). Please call the clinic if you have not received communication after 1 week or if you have any questions.?  Clinic Fax: [phone]  _________________________________________________________________________________________________________________________________________________________  Meal Replacement Products:    Here is the link to our new e-store where you can purchase our meal replacement products    [facility] E-Store  [employer]Gl[employer]/store    The one week starter kit is a great way to sample a variety of products and see what works for you.    If you want more information about the product go to: Fresh Steps Meals  [employer]    If you are an employee or [facility] or [facility] please contact your care team for a 10% estore discount    Free Shipping for orders over $75     Benefits of meal replacements products:    Portion and calorie control  Improved nutrition  Structured eating  Simplified food choices  Avoid contact with trigger foods  _________________________________________________________________________________________________________________________________________________________  Interested in working with a health ?  Health coaches work with you to improve your overall health and wellbeing.  They look at the whole person, and may involve discussion of different areas of life, including, but not limited to the four pillars of health (sleep, exercise, nutrition, and stress management). Discuss with your care team if you would like to start working a health .  Health Coaching-3 Pack: Schedule by calling [phone]    $99 for three health coaching visits    Visits may be done in person or via phone    Coaching is a partnership between the  and the client; Coaches do not prescribe or diagnose    Coaching helps inspire the client to reach his/her personal goals    _________________________________________________________________________________________________________________________________________________________  24 Week Healthy Lifestyle Plan:    Our mission in the 24-week Healthy Lifestyle Plan is to provide you with individualized care by giving you the tools, education and support you need to lose weight and maintain a healthy lifestyle. In your 24-week journey, you ll be supported by a dedicated weight loss team that includes registered dietitians, medical weight management providers, health coaches, and nurses -- all with special expertise in weight loss -- to help you every step of the way.     Monthly meetings with your registered dietician or medical weight management provider help to review your progress, update your care plan, and make any adjustments needed to ensure success. Between these visits, weekly and bi-weekly health  visits will help you focus on the four pillars of weight loss -- stress, sleep, nutrition, and exercise -- and how you can best adapt each to achieve sustainable weight loss results.    In addition, you will be given exclusive access to online wellbeing classes through [employer].  Your initial visit will be with a medical weight management provider who will help to understand your weight loss goals and ensure this program is the right fit for you. Please let our team know if you are interested in the 24 week plan by sending a message to your care team or calling [phone] to schedule.  _________________________________________________________________________________________________________________________________________________________    Virtual Support Groups:    We offer support groups for patients who are working on weight loss and considering, preparing for or have had weight loss surgery.     You are invited to attend the?Virtual Support Groups?provided by any of the following locations:    1. [place] via [employer]  with Rebekah Sandhu RN  2.   [place] via [facility] Teams with Ihsan Banks, PhD, LP  3.   [place] via [facility] Teams with Nani Gallego RN  4.   [facility] via [facility] Teams with Nani Lee [facility] Healthy Lifestyle Virtual Support Group    Healthy Lifestyle Virtual Support Group?    This group is held monthly on a Friday from 12:30 PM - to 1:30 PM. It is 60 minutes of small group guided discussion, support and resources led by National Board Certified Health , Nani Lee. All are welcome who want a healthy lifestyle. To receive monthly invites to the Healthy Lifestyle Virtual Support Group or if you have any questions about having a health  or attending support group, please email Nani [email or web address]. Nani will send out invites for each session, with the phone number and the conference ID number specific to that session.    2021 Meetings    January 29 - How to Stay Active and Healthy during the Winter Months    February 26 - Reading Food Labels: What do I Need to Know?  Guest Speaker: Jailene Eisenberg RD    March 19 - Finding Health, Happiness and Confidence at Every Size  Guest Speaker: Arielle Lance, Health Psychology Fellow    April 30 - Healthy Eating on a Budget  Guest Speaker: Sandra Unger RD    May 21 - Open Forum    Jun 25 - Self Compassion    July 30 - Habits: Helpful and Hindering    August 27 - Open Forum    September 24 - Sleep    October 29 - Open Forum    November - To be determined    December - To be determined    [facility] Weight Loss Surgery Support Group    [facility] Weight Loss Surgery Support Group    The [facility] Weight Loss Surgery Support Group is a patient-lead forum that meets monthly. Due to Covid-19, we are meeting remotely from 5 PM - 6 PM via Microsoft Teams on the 3rd Wednesday of the month. The group is facilitated by Rebekah  "Edson, the program s Clinical Coordinator. The support group shares experiences, encouragement and education. It is open to those who have had weight loss surgery, are scheduled for surgery, and those who are considering surgery.   If you are interested in attending, please contact the clinic via [employer] or call the nurse line directly at [phone] to inform our staff that you would like an invite sent to you. Please let us know the email you would like the invite sent to. Prior to the meeting, a link with directions on how to join the meeting will be sent to you.    2021 Meetings    January 20 - Guest Speaker: Buddy Cee, RD, LD     February 17 - The group will not have a speaker. This will be a time of group support.     March 17 - Guest Speaker: Holly Levi, [facility], [facility], [facility]     April 21 - Guest Speakers. Albaro Arthur and hCance will be talking about Mindful Eating and Mind Hunger.     May 19- No Group this month.     June 16 - \"Let's Talk\" a group discussion and time to talk and ask questions and learn from one another.    [facility] Support Groups    Connections: Bariatric Care Support Group?  This group takes place the second Tuesday of each month from 6:30 PM - 8 PM virtually using Microsoft Teams. It is led by Ihsan Banks, Ph.D who is a Licensed Psychologist with the [facility] Comprehensive Weight Management Program. There is no cost for group participation and it is open to all [facility] (and those external to this program) pre- and post- operative bariatric surgery patients as well as their support system.   Please send an email to Ihsan Banks, Ph.D., LP at?[email or web address]?if you would like an invitation to the group and to learn about using Microsoft Teams.    Connections: Post-Operative Bariatric Surgery Support Group  This support group meets the 4th Wednesday of the month from 11 AM - 12 PM virtually using " Microsoft Teams. It is led by aNni Gallego RN. This is a support group for [facility] bariatric patients (and those external to [facility]) who have had bariatric surgery and are at least 3 months post-surgery. There is no cost to attend and registration is not required. Please send an email to Nani Gallego RN at [email or web address] if you would like an invitation to the group and to learn about using Microsoft Teams.      The above Support Group information can also be found on our websites:    [email or web address]  _________________________________________________________________________________________________________________________________________________________  [facility] Get Moving Program  Our team of physical therapists is trained to help you understand and take control of your condition. They will perform a thorough evaluation to determine your ability for activity and develop a customized plan to fit your goals and physical ability.  Scheduling: Unsure if the Get Moving program is right for you? Discuss the program with your medical provider or diabetes educator. You can also call us at [phone] to ask questions or schedule an appointment.   [facility] Get Moving Program  _________________________________________________________________________________________________________________________________________________________  [facility] Diabetes Prevention Program (DPP)  If you have prediabetes and Medicare please contact us via Meiyout to learn more about the Diabetes Prevention Program (DPP)  Program Details:  [facility] offers the year-long Diabetes Prevention Program (DPP). The program helps you to make lifestyle changes that prevent or delay type 2 diabetes by supporting healthy eating, increased physical activity, stress reduction and use of coping skills.    On average, previous [employer] DPP cohorts have lost and maintained at least 5% of their starting weight throughout the program and averaged more than 150 minutes of physical activity per week.  Participants meet weekly for one-hour group sessions over sixteen weeks, every other week for the next 8 weeks, and monthly for the last six months.   A year-long maintenance program is also available for participants who complete the first year.   Location & Cost:   During the COVID-19 Public Health Emergency, the program is offered virtually. When in-person classes can resume, they will be held at [facility].  For people with Medicare, the program is covered in full. A self-pay option will also be available for those with non-Medicare insurance plans.   _________________________________________________________________________________________________________________________________________________________  Bluetooth Scale:    We hope to provide you with high quality virtual healthcare visits while social distancing for COVID-19 is necessary, as well as in the future when virtual visits may be more convenient for you.     Our technology team made it possible for Bluetooth scales to send weight measurements to our electronic medical record. This allows weights from you weighing at home to securely flow into the medical record, which will improve telephone and virtual visits.   Additionally, studies have shown that adults actually lose more weight when their weights are automatically sent to someone else, and also that this process is not stressful for those adults.    Below is a link for purchasing the scale, with a discount code for our patients. You may call your insurance company to see if they will reimburse you for the cost of the scale, as a piece of durable medical equipment. The scales only go up to a weight of 400 pounds. This is an issue and we are working with the  developer on increasing this. We found no scales that go over 400lb that have blue-tooth for connecting to [place].    Scale to purchase: the [employer]  Body  Scale: [email or web address]/shop?[email or web address]&gclsrc=aw.ds    Discount Code: We have a discount code for our patients to bring the cost down to $50, Discount code is: [place]_Scale_20%off      Thank you,   [facility] Comprehensive Weight Management Team

## 2021-08-04 NOTE — PROGRESS NOTES
Alba is a 30 year old who is being evaluated via a billable video visit.      How would you like to obtain your AVS? MyChart  If the video visit is dropped, the invitation should be resent by: Send to e-mail at: [email or web address]  Will anyone else be joining your video visit? No       During this virtual visit the patient is located in [place], patient verifies this as the location during the entirety of this visit.       Video Start Time: 6:30pm  Video-Visit Details    Type of service:  Video Visit    Video End Time:7:03pm    Originating Location (pt. Location): Home    Distant Location (provider location):  [facility] [place]     Platform used for Video Visit: [employer]
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
## 2021-08-12 ENCOUNTER — OFFICE VISIT (OUTPATIENT)
Dept: FAMILY MEDICINE | Facility: OTHER | Age: 31
End: 2021-08-12
Attending: PHYSICIAN ASSISTANT
Payer: COMMERCIAL

## 2021-08-12 VITALS
HEART RATE: 101 BPM | TEMPERATURE: 97.5 F | RESPIRATION RATE: 12 BRPM | SYSTOLIC BLOOD PRESSURE: 134 MMHG | BODY MASS INDEX: 31.47 KG/M2 | HEIGHT: 62 IN | OXYGEN SATURATION: 97 % | WEIGHT: 171 LBS | DIASTOLIC BLOOD PRESSURE: 78 MMHG

## 2021-08-12 DIAGNOSIS — F41.1 GAD (GENERALIZED ANXIETY DISORDER): Primary | ICD-10-CM

## 2021-08-12 PROCEDURE — G0463 HOSPITAL OUTPT CLINIC VISIT: HCPCS | Performed by: PHYSICIAN ASSISTANT

## 2021-08-12 PROCEDURE — 99212 OFFICE O/P EST SF 10 MIN: CPT | Performed by: PHYSICIAN ASSISTANT

## 2021-08-12 RX ORDER — CHOLECALCIFEROL (VITAMIN D3) 50 MCG
1 TABLET ORAL DAILY
Status: ON HOLD | COMMUNITY
End: 2024-07-22

## 2021-08-12 ASSESSMENT — PAIN SCALES - GENERAL: PAINLEVEL: NO PAIN (0)

## 2021-08-12 ASSESSMENT — PATIENT HEALTH QUESTIONNAIRE - PHQ9
SUM OF ALL RESPONSES TO PHQ QUESTIONS 1-9: 12
10. IF YOU CHECKED OFF ANY PROBLEMS, HOW DIFFICULT HAVE THESE PROBLEMS MADE IT FOR YOU TO DO YOUR WORK, TAKE CARE OF THINGS AT HOME, OR GET ALONG WITH OTHER PEOPLE: VERY DIFFICULT
SUM OF ALL RESPONSES TO PHQ QUESTIONS 1-9: 12

## 2021-08-12 ASSESSMENT — MIFFLIN-ST. JEOR: SCORE: 1443.9

## 2021-08-12 NOTE — PATIENT INSTRUCTIONS
Call [facility] to get a diagnostic assessment completed. Please have them fax those results to [facility]: Renay Castorena. Consider possibly following with a mental health provider through [facility] for medication management once the diagnostic assessment has been completed.

## 2021-08-12 NOTE — NURSING NOTE
Patient presents to clinic to discuss anxiety.  Roro Mcmullen LPN ....................  8/12/2021   2:06 PM

## 2021-08-13 ASSESSMENT — PATIENT HEALTH QUESTIONNAIRE - PHQ9: SUM OF ALL RESPONSES TO PHQ QUESTIONS 1-9: 12

## 2021-08-15 ENCOUNTER — MYC MEDICAL ADVICE (OUTPATIENT)
Dept: FAMILY MEDICINE | Facility: OTHER | Age: 31
End: 2021-08-15

## 2021-10-19 ENCOUNTER — VIRTUAL VISIT (OUTPATIENT)
Dept: ENDOCRINOLOGY | Facility: CLINIC | Age: 31
End: 2021-10-19
Payer: COMMERCIAL

## 2021-10-19 VITALS — WEIGHT: 173 LBS | BODY MASS INDEX: 31.83 KG/M2 | HEIGHT: 62 IN

## 2021-10-19 DIAGNOSIS — E66.811 OBESITY, CLASS I, BMI 30-34.9: Primary | ICD-10-CM

## 2021-10-19 PROCEDURE — 99213 OFFICE O/P EST LOW 20 MIN: CPT | Mod: 95 | Performed by: NURSE PRACTITIONER

## 2021-10-19 RX ORDER — TOPIRAMATE 25 MG/1
25 TABLET, FILM COATED ORAL 2 TIMES DAILY
Qty: 60 TABLET | Refills: 3 | Status: SHIPPED | OUTPATIENT
Start: 2021-10-19 | End: 2022-02-14

## 2021-10-19 ASSESSMENT — MIFFLIN-ST. JEOR: SCORE: 1447.97

## 2021-10-19 ASSESSMENT — PAIN SCALES - GENERAL: PAINLEVEL: NO PAIN (0)

## 2021-10-19 NOTE — PROGRESS NOTES
Alba is a 31 year old who is being evaluated via a billable video visit.      How would you like to obtain your AVS? MyChart  If the video visit is dropped, the invitation should be resent by: Text to cell phone: [phone]  Will anyone else be joining your video visit? No      Video Start Time: 0829  Video-Visit Details    Type of service:  Video Visit    Video End Time:0845    Originating Location (pt. Location): Home    Distant Location (provider location):  [facility] [place]     Platform used for Video Visit: EpiVax

## 2021-10-19 NOTE — LETTER
10/19/2021       RE: Alba Lee  [address]  [facility] [id number]     Dear Colleague,    Thank you for referring your patient, Alba Lee, to the [facility] [place] at [facility]. Please see a copy of my visit note below.      23 minutes spent on the date of the encounter doing chart review, history and exam, documentation and further activities per the note    Return Medical Weight Management Note     Alba Lee  MRN:  [id number]  :  1990  LUNA:  10/19/2021    Dear Renay Castorena PA-C,    I had the pleasure of seeing your patient Alba Lee. She is a 31 year old female who I am continuing to see for treatment of obesity related to:       2021   I have the following health issues associated with obesity: None of the above   I have the following symptoms associated with obesity: Depression, Fatigue       Assessment & Plan   Problem List Items Addressed This Visit        Other    Obesity, Class I, BMI 30-34.9 - Primary    Relevant Orders    Comprehensive metabolic panel             INTERVAL HISTORY:  Last seen 8/3/2021- wanted to restart topiramate at low dose (had been tapered off due to palpitations and breathing concerns) after experiencing weight gain off topiramate (lost sense of control and increased hunger/ urgency around food). Loss of control negatively impacting mental health. Discussed contrave and wegovy. Encouraged working with therapist. Has since restarted 25mg of topiramate and most recently increased to 50mg in September.     PHQ 8/10/2021 2021 2021   PHQ-9 Total Score 11 12 15   Q9: Thoughts of better off dead/self-harm past 2 weeks Not at all Not at all Not at all     DOMINIC-7 SCORE 2021 8/10/2021 2021   Total Score - 15 (severe anxiety) 17 (severe anxiety)   Total Score 0 15 17     Increased difficulty with breathing again at 50mg -  tolerated better 25mg twice daily (started 9/11/2021). Very rare shortness of breath. Likes the benefits of the medication. Drinks 150mg water daily     Spoke to therapist end of September. Felt it was more anxiety or ADHD and needs neuropsych eval beginning of November.   Feels like once her mental health improves, she will be in a better head space to motivate     Reflux which may also be playing a role in sob.     CURRENT WEIGHT:   173 lbs 0 oz    Initial Weight (lbs): 182 lbs  Last Visits Weight: 75.8 kg (167 lb)  Cumulative weight loss (lbs): 9  Weight Loss Percentage: 4.95%    Changes and Difficulties 10/19/2021   I have made the following changes to my diet since my last visit: none   With regards to my diet, I am still struggling with: was doing well and over the summer gained weight   I have made the following changes to my activity/exercise since my last visit: none   With regards to my activity/exercise, I am still struggling with: none         MEDICATIONS:   Current Outpatient Medications   Medication Sig Dispense Refill     topiramate (TOPAMAX) 25 MG tablet 25mg at bedtime for week 1, 50mg at bedtime for 1 week, and 75mg at bedtime thereafter 90 tablet 1     vitamin D3 (CHOLECALCIFEROL) 50 mcg (2000 units) tablet Take 1 tablet by mouth daily       desogestrel-ethinyl estradiol (APRI) 0.15-30 MG-MCG tablet Take 1 tablet by mouth daily (Patient not taking: Reported on 10/19/2021) 84 tablet 0       Weight Loss Medication History Reviewed With Patient 10/19/2021   Which weight loss medications are you currently taking on a regular basis?  Topamax (topiramate)   If you are not taking a weight loss medication that was prescribed to you, please indicate why: -   Are you having any side effects from the weight loss medication that we have prescribed you? Yes   If you are having side effects please describe: SOB and palpitations but not as severe as before, not sure if I am overreacting       Office Visit on  "05/13/2021   Component Date Value Ref Range Status     Interpretation ECG 05/13/2021 Click View Image link to view waveform and result   Final-Edited     Vitamin D Total 05/13/2021 49.0  ng/mL Final    Comment: Comments:  Deficiency:             <10 ng/mL  Insufficiency:        10-29 ng/mL  Sufficiency:          ng/mL  Possible Toxicity:     >100 ng/mL       Magnesium 05/13/2021 2.0  1.9 - 2.7 mg/dL Final     TSH Reflex 05/13/2021 1.57  0.34 - 5.60 IU/mL Final     Sodium 05/13/2021 138  134 - 144 mmol/L Final     Potassium 05/13/2021 3.4* 3.5 - 5.1 mmol/L Final     Chloride 05/13/2021 108* 98 - 107 mmol/L Final     Carbon Dioxide 05/13/2021 23  21 - 31 mmol/L Final     Anion Gap 05/13/2021 7  3 - 14 mmol/L Final     Glucose 05/13/2021 100  70 - 105 mg/dL Final     Urea Nitrogen 05/13/2021 14  7 - 25 mg/dL Final     Creatinine 05/13/2021 0.75  0.60 - 1.20 mg/dL Final     GFR Estimate 05/13/2021 >90  >60 mL/min/[1.73_m2] Final     GFR Estimate If Black 05/13/2021 >90  >60 mL/min/[1.73_m2] Final     Calcium 05/13/2021 8.9  8.6 - 10.3 mg/dL Final     Bilirubin Total 05/13/2021 0.4  0.3 - 1.0 mg/dL Final     Albumin 05/13/2021 4.2  3.5 - 5.7 g/dL Final     Protein Total 05/13/2021 6.9  6.4 - 8.9 g/dL Final     Alkaline Phosphatase 05/13/2021 56  34 - 104 U/L Final     ALT 05/13/2021 11  7 - 52 U/L Final     AST 05/13/2021 13  13 - 39 U/L Final     WBC 05/13/2021 7.6  4.0 - 11.0 10e9/L Final     RBC Count 05/13/2021 4.30  3.8 - 5.2 10e12/L Final     Hemoglobin 05/13/2021 12.4  11.7 - 15.7 g/dL Final     Hematocrit 05/13/2021 37.3  35.0 - 47.0 % Final     MCV 05/13/2021 87  78 - 100 fl Final     MCH 05/13/2021 28.8  26.5 - 33.0 pg Final     MCHC 05/13/2021 33.2  31.5 - 36.5 g/dL Final     RDW 05/13/2021 12.4  10.0 - 15.0 % Final     Platelet Count 05/13/2021 244  150 - 450 10e9/L Final       PHYSICAL EXAM:  Objective    Ht 1.575 m (5' 2\")   Wt 78.5 kg (173 lb)   BMI 31.64 kg/m      Vitals - Patient Reported  Pain " Score: No Pain (0)      Vitals:  No vitals were obtained today due to virtual visit.    GENERAL: Healthy, alert and no distress  EYES: Eyes grossly normal to inspection.  No discharge or erythema, or obvious scleral/conjunctival abnormalities.  RESP: No audible wheeze, cough, or visible cyanosis.  No visible retractions or increased work of breathing.    SKIN: Visible skin clear. No significant rash, abnormal pigmentation or lesions.  NEURO: Cranial nerves grossly intact.  Mentation and speech appropriate for age.  PSYCH: Mentation appears normal, affect normal/bright, judgement and insight intact, normal speech and appearance well-groomed.        Sincerely,    Radha Alvarado NP    Alba is a 31 year old who is being evaluated via a billable video visit.      How would you like to obtain your AVS? MyChart  If the video visit is dropped, the invitation should be resent by: Text to cell phone: [phone]  Will anyone else be joining your video visit? No      Video Start Time: 0829  Video-Visit Details    Type of service:  Video Visit    Video End Time:0845    Originating Location (pt. Location): Home    Distant Location (provider location):  [facility] [place]     Platform used for Video Visit: eegoes

## 2021-10-19 NOTE — PROGRESS NOTES
23 minutes spent on the date of the encounter doing chart review, history and exam, documentation and further activities per the note    Return Medical Weight Management Note     Alba Lee  MRN:  [id number]  :  1990  LUNA:  10/19/2021    Dear Renay Castorena PA-C,    I had the pleasure of seeing your patient Alba Lee. She is a 31 year old female who I am continuing to see for treatment of obesity related to:       2021   I have the following health issues associated with obesity: None of the above   I have the following symptoms associated with obesity: Depression, Fatigue       Assessment & Plan   Problem List Items Addressed This Visit        Other    Obesity, Class I, BMI 30-34.9 - Primary    Relevant Orders    Comprehensive metabolic panel             INTERVAL HISTORY:  Last seen 8/3/2021- wanted to restart topiramate at low dose (had been tapered off due to palpitations and breathing concerns) after experiencing weight gain off topiramate (lost sense of control and increased hunger/ urgency around food). Loss of control negatively impacting mental health. Discussed contrave and wegovy. Encouraged working with therapist. Has since restarted 25mg of topiramate and most recently increased to 50mg in September.     PHQ 8/10/2021 2021 2021   PHQ-9 Total Score 11 12 15   Q9: Thoughts of better off dead/self-harm past 2 weeks Not at all Not at all Not at all     DOMINIC-7 SCORE 2021 8/10/2021 2021   Total Score - 15 (severe anxiety) 17 (severe anxiety)   Total Score 0 15 17     Increased difficulty with breathing again at 50mg - tolerated better 25mg twice daily (started 2021). Very rare shortness of breath. Likes the benefits of the medication. Drinks 150mg water daily     Spoke to therapist end of September. Felt it was more anxiety or ADHD and needs neuropsych eval beginning of November.   Feels like once her mental health improves, she will be in a  better head space to motivate     Reflux which may also be playing a role in sob.     CURRENT WEIGHT:   173 lbs 0 oz    Initial Weight (lbs): 182 lbs  Last Visits Weight: 75.8 kg (167 lb)  Cumulative weight loss (lbs): 9  Weight Loss Percentage: 4.95%    Changes and Difficulties 10/19/2021   I have made the following changes to my diet since my last visit: none   With regards to my diet, I am still struggling with: was doing well and over the summer gained weight   I have made the following changes to my activity/exercise since my last visit: none   With regards to my activity/exercise, I am still struggling with: none         MEDICATIONS:   Current Outpatient Medications   Medication Sig Dispense Refill     topiramate (TOPAMAX) 25 MG tablet 25mg at bedtime for week 1, 50mg at bedtime for 1 week, and 75mg at bedtime thereafter 90 tablet 1     vitamin D3 (CHOLECALCIFEROL) 50 mcg (2000 units) tablet Take 1 tablet by mouth daily       desogestrel-ethinyl estradiol (APRI) 0.15-30 MG-MCG tablet Take 1 tablet by mouth daily (Patient not taking: Reported on 10/19/2021) 84 tablet 0       Weight Loss Medication History Reviewed With Patient 10/19/2021   Which weight loss medications are you currently taking on a regular basis?  Topamax (topiramate)   If you are not taking a weight loss medication that was prescribed to you, please indicate why: -   Are you having any side effects from the weight loss medication that we have prescribed you? Yes   If you are having side effects please describe: SOB and palpitations but not as severe as before, not sure if I am overreacting       Office Visit on 05/13/2021   Component Date Value Ref Range Status     Interpretation ECG 05/13/2021 Click View Image link to view waveform and result   Final-Edited     Vitamin D Total 05/13/2021 49.0  ng/mL Final    Comment: Comments:  Deficiency:             <10 ng/mL  Insufficiency:        10-29 ng/mL  Sufficiency:          ng/mL  Possible  "Toxicity:     >100 ng/mL       Magnesium 05/13/2021 2.0  1.9 - 2.7 mg/dL Final     TSH Reflex 05/13/2021 1.57  0.34 - 5.60 IU/mL Final     Sodium 05/13/2021 138  134 - 144 mmol/L Final     Potassium 05/13/2021 3.4* 3.5 - 5.1 mmol/L Final     Chloride 05/13/2021 108* 98 - 107 mmol/L Final     Carbon Dioxide 05/13/2021 23  21 - 31 mmol/L Final     Anion Gap 05/13/2021 7  3 - 14 mmol/L Final     Glucose 05/13/2021 100  70 - 105 mg/dL Final     Urea Nitrogen 05/13/2021 14  7 - 25 mg/dL Final     Creatinine 05/13/2021 0.75  0.60 - 1.20 mg/dL Final     GFR Estimate 05/13/2021 >90  >60 mL/min/[1.73_m2] Final     GFR Estimate If Black 05/13/2021 >90  >60 mL/min/[1.73_m2] Final     Calcium 05/13/2021 8.9  8.6 - 10.3 mg/dL Final     Bilirubin Total 05/13/2021 0.4  0.3 - 1.0 mg/dL Final     Albumin 05/13/2021 4.2  3.5 - 5.7 g/dL Final     Protein Total 05/13/2021 6.9  6.4 - 8.9 g/dL Final     Alkaline Phosphatase 05/13/2021 56  34 - 104 U/L Final     ALT 05/13/2021 11  7 - 52 U/L Final     AST 05/13/2021 13  13 - 39 U/L Final     WBC 05/13/2021 7.6  4.0 - 11.0 10e9/L Final     RBC Count 05/13/2021 4.30  3.8 - 5.2 10e12/L Final     Hemoglobin 05/13/2021 12.4  11.7 - 15.7 g/dL Final     Hematocrit 05/13/2021 37.3  35.0 - 47.0 % Final     MCV 05/13/2021 87  78 - 100 fl Final     MCH 05/13/2021 28.8  26.5 - 33.0 pg Final     MCHC 05/13/2021 33.2  31.5 - 36.5 g/dL Final     RDW 05/13/2021 12.4  10.0 - 15.0 % Final     Platelet Count 05/13/2021 244  150 - 450 10e9/L Final       PHYSICAL EXAM:  Objective    Ht 1.575 m (5' 2\")   Wt 78.5 kg (173 lb)   BMI 31.64 kg/m      Vitals - Patient Reported  Pain Score: No Pain (0)      Vitals:  No vitals were obtained today due to virtual visit.    GENERAL: Healthy, alert and no distress  EYES: Eyes grossly normal to inspection.  No discharge or erythema, or obvious scleral/conjunctival abnormalities.  RESP: No audible wheeze, cough, or visible cyanosis.  No visible retractions or increased " work of breathing.    SKIN: Visible skin clear. No significant rash, abnormal pigmentation or lesions.  NEURO: Cranial nerves grossly intact.  Mentation and speech appropriate for age.  PSYCH: Mentation appears normal, affect normal/bright, judgement and insight intact, normal speech and appearance well-groomed.        Sincerely,    Radha Alvarado, NP

## 2021-10-19 NOTE — PATIENT INSTRUCTIONS
"Thank you for allowing us the privilege of caring for you. We hope we provided you with the excellent service you deserve.   Please let us know if there is anything else we can do for you so that we can be sure you are completely satisfied with your care experience.    To ensure the quality of our services you may be receiving a patient satisfaction survey from an independent patient satisfaction monitoring company.    The greatest compliment you can give is a \"Likely to Recommend\"    Your visit was with Radha Alvarado NP today.    Instructions per today's visit:     Pipe Lee, it was great to visit with you today.  Here is a review of our visit.  If our clinic scheduler is not able to reach you please call [phone] to schedule your next appointments.    -get labs done  -add electrolyte based drink   -continue topiramate 25mg twice daily   -follow up with psych  -consider WEGOVY- see information below       Information about Video Visits with [facility]: video visit information  _________________________________________________________________________________________________________________________________________________________  Important contact and scheduling information:  Please call our contact center at [phone] to schedule your next appointments.  To find a lab location near you, please call [phone].  For any nursing questions or concerns call Atiya Jamil LPN at [phone] or Karyn Kennedy RN at [phone]  Please call during clinic hours Monday through Friday 8:00a - 4:00p if you have questions or you can contact us via Findery at anytime and we will reply during clinic hours.    Lab results will be communicated through My Chart or letter (if My Chart not used). Please call the clinic if you have not received communication after 1 week or if you have any questions.?  Clinic Fax: " [phone]  _________________________________________________________________________________________________________________________________________________________  Meal Replacement Products:    Here is the link to our new e-store where you can purchase our meal replacement products     [employer] E-Store  [employer]/store    The one week starter kit is a great way to sample a variety of products and see what works for you.    If you want more information about the product go to: [employer] Meals  [employer]    If you are an employee or [facility] or  [employer] please contact your care team for a 10% estore discount    Free Shipping for orders over $75     Benefits of meal replacements products:    Portion and calorie control  Improved nutrition  Structured eating  Simplified food choices  Avoid contact with trigger foods  _________________________________________________________________________________________________________________________________________________________  Interested in working with a health ?  Health coaches work with you to improve your overall health and wellbeing.  They look at the whole person, and may involve discussion of different areas of life, including, but not limited to the four pillars of health (sleep, exercise, nutrition, and stress management). Discuss with your care team if you would like to start working a health .  Health Coaching-3 Pack: Schedule by calling [phone]    $99 for three health coaching visits    Visits may be done in person or via phone    Coaching is a partnership between the  and the client; Coaches do not prescribe or diagnose    Coaching helps inspire the client to reach his/her personal goals   _________________________________________________________________________________________________________________________________________________________  24 Week Healthy Lifestyle Plan:    Our  mission in the 24-week Healthy Lifestyle Plan is to provide you with individualized care by giving you the tools, education and support you need to lose weight and maintain a healthy lifestyle. In your 24-week journey, you ll be supported by a dedicated weight loss team that includes registered dietitians, medical weight management providers, health coaches, and nurses -- all with special expertise in weight loss -- to help you every step of the way.     Monthly meetings with your registered dietician or medical weight management provider help to review your progress, update your care plan, and make any adjustments needed to ensure success. Between these visits, weekly and bi-weekly health  visits will help you focus on the four pillars of weight loss -- stress, sleep, nutrition, and exercise -- and how you can best adapt each to achieve sustainable weight loss results.    In addition, you will be given exclusive access to online wellbeing classes through [employer].  Your initial visit will be with a medical weight management provider who will help to understand your weight loss goals and ensure this program is the right fit for you. Please let our team know if you are interested in the 24 week plan by sending a message to your care team or calling [phone] to schedule.  _________________________________________________________________________________________________________________________________________________________    COMPREHENSIVE WEIGHT MANAGEMENT PROGRAM  VIRTUAL SUPPORT GROUPS    For Support Group Information:      We offer support groups for patients who are working on weight loss and considering, preparing for or have had weight loss surgery.   There is no cost for this opportunity.  You are invited to attend the?Virtual Support Groups?provided by any of the following locations:    1. [place] via [employer] Teams with Rebekah Sandhu RN  2.   [place] via [employer] Teams with Ihsan Banks, PhD,  "  3.   [place] via [employer] Teams with Nani Gallego RN  4.   [facility] via [employer] Teams with [facility]    The following Support Group information can also be found on our website:  [email or web address]      [facility] Weight Loss Surgery Support Group    [facility] Weight Loss Surgery Support Group  The support group is a patient-lead forum that meets monthly to share experiences, encouragement and education. It is open to those who have had weight loss surgery, are scheduled for surgery, and those who are considering surgery.   WHEN: This group meets on the 3rd Wednesday of each month from 5:00PM - 6:00PM virtually using Microsoft Teams.   FACILITATOR: Led by Rebekah Sandhu, the program's Clinical Coordinator.   TO REGISTER: Please contact the clinic via [employer] or call the nurse line directly at [phone] to inform our staff that you would like an invite sent to you and to let us know the email you would like the invite sent to. Prior to the meeting, a link with directions on how to join the meeting will be sent to you.    2021 Meetings  August 18: \"Let's Talk\" a time for the group to share.  September 15: \"Let's Talk\" a time for the group to share.  October 20: \"Let's Talk\" a time for the group to share.  November 17: Keya Larose RD, JACOBO \"Protein, Metabolism and Meal Planning\"  December 15: Buddy Cee RD, LD will speak, \"Recipe Modification\"    [facility] Support Groups    Connections: Bariatric Care Support Group?  This is open to all [facility] (and those external to this program) pre- and post- operative bariatric surgery patients as well as their support system.   WHEN: This group meets the 2nd Tuesday of each month from 6:30 PM - 8:00 PM virtually using Microsoft Teams.   FACILITATOR: Led by Ihsan Banks, Ph.D who is a Licensed " "Psychologist with the [facility] Comprehensive Weight Management Program.   TO REGISTER: Please send an email to Ihsan Banks, Ph.D., LP at?[email or web address]?if you would like an invitation to the group and to learn about using Microsoft Teams.    2021 Meetings  August 10: Open Forum  September 14: Guest Speaker: Nani Gallego RN,CBN, CIC, [facility] Comprehensive Weight Management Program, \"Your Hospital Stay and Post-Operative Compliance\"  October 12: Open Forum  November 9: Guest Speaker: Alexandrea Ríos RD,LD, [facility] Weight Management [facility],\"Holiday Eating\".  December 14: Guest Speaker Connie Preciado MD, MPH, [facility], [facility], \"Body Contouring Surgery for Bariatric Surgery Patients\"     Connections: Post-Operative Bariatric Surgery Support Group  This is a support group for [facility] bariatric patients (and those external to [facility]) who have had bariatric surgery and are at least 3 months post-surgery.  WHEN: This support group meets the 4th Wednesday of the month from 11:00 AM - 12:00 PM virtually using Microsoft Teams.   FACILITATOR: Led by Certified Bariatric Nurse, Nani Gallego RN.   TO REGISTER: Please send an email to Nani at [email or web address] if you would like an invitation to the group and to learn about using [employer].      [facility] Healthy Lifestyle Virtual Support Group    Healthy Lifestyle Virtual Support Group?  This is 60 minutes of small group guided discussion, support and resources. All are welcome who want a healthy lifestyle.  WHEN: This group meets monthly on a Friday from 12:30 PM - to 1:30 PM virtually using Microsoft Teams.   FACILITATOR: Led by National Board Certified Health , Nani Lee, [facility].   TO REGISTER: Please send an email to Nani at?[email or web address] to receive monthly invites to the group or if you have any questions " about having a health .  Prior to the meeting, a link with directions on how to join the meeting will be sent to you.    2021 Meetings  August 27: Open Forum  September 24: Sleep and the 7 Types of Rest  October 29: Open Forum  November 19: Gratitude     December 10: Open Forum  _________________________________________________________________________________________________________________________________________________________  [place] [facility] Get Moving Program  Our team of physical therapists is trained to help you understand and take control of your condition. They will perform a thorough evaluation to determine your ability for activity and develop a customized plan to fit your goals and physical ability.  Scheduling: Unsure if the Get Moving program is right for you? Discuss the program with your medical provider or diabetes educator. You can also call us at [phone] to ask questions or schedule an appointment.   [facility] Get Moving Program  _________________________________________________________________________________________________________________________________________________________  [facility] Diabetes Prevention Program (DPP)  If you have prediabetes and Medicare please contact us via [employer] to learn more about the Diabetes Prevention Program (DPP)  Program Details:  [facility] offers the year-long Diabetes Prevention Program (DPP). The program helps you to make lifestyle changes that prevent or delay type 2 diabetes by supporting healthy eating, increased physical activity, stress reduction and use of coping skills.   On average, previous [facility] DPP cohorts have lost and maintained at least 5% of their starting weight throughout the program and averaged more than 150 minutes of physical activity per week.  Participants meet weekly for one-hour group sessions over sixteen weeks, every other week for the next 8 weeks, and monthly for the last six  months.   A year-long maintenance program is also available for participants who complete the first year.   Location & Cost:   During the COVID-19 Public Health Emergency, the program is offered virtually. When in-person classes can resume, they will be held at [facility].  For people with Medicare, the program is covered in full. A self-pay option will also be available for those with non-Medicare insurance plans.   _________________________________________________________________________________________________________________________________________________________  Bluetooth Scale:    We hope to provide you with high quality virtual healthcare visits while social distancing for COVID-19 is necessary, as well as in the future when virtual visits may be more convenient for you.     Our technology team made it possible for Bluetooth scales to send weight measurements to our electronic medical record. This allows weights from you weighing at home to securely flow into the medical record, which will improve telephone and virtual visits.   Additionally, studies have shown that adults actually lose more weight when their weights are automatically sent to someone else, and also that this process is not stressful for those adults.    Below is a link for purchasing the scale, with a discount code for our patients. You may call your insurance company to see if they will reimburse you for the cost of the scale, as a piece of durable medical equipment. The scales only go up to a weight of 400 pounds. This is an issue and we are working with the developer on increasing this. We found no scales that go over 400lb that have blue-tooth for connecting to [employer].    Scale to purchase: the [employer]  Body  Scale: [email or web address]/body/[email or web address]=aw.ds    Discount Code: We have a discount code for our patients to bring the  cost down to $50, Discount code is: [place]_Scale_20%off      Thank you,   [facility] Comprehensive Weight Management Team    WEGOVY (semaglutide)    What is Wegovy?    Wegovy (semaglutide) injection 2.4 mg is an injectable prescription medicine used for adults with obesity (BMI ?30) or overweight (excess weight) (BMI ?27) who also have weight-related medical problems to help them lose weight and keep the weight off.    1.  Start Wegovy (semaglutide) 0.25 mg once weekly for 4 weeks, then if tolerating increase to 0.5 mg weekly for 4 weeks, then if tolerating increase to 1 mg weekly for 4 weeks, then if tolerating increase to 1.7 mg weekly for 4 weeks, then if tolerating increase to 2.4 mg weekly thereafter.   -Each Wegovy pen is a different color to help identify the different dose strengths   -Each Wegovy pen is a once weekly single-dose prefilled pen with a pen injector already built within the pen. Discard the Wegovy pen after use in sharps container.     2. Storage: make sure that when you get the prescription that you store the prescription in the refrigerator until it is time to use the Wegovy pen.  Once it is time to use the Wegovy pen, you can keep the pen at room temperature and it is good for up to 28 days at room temperature.     3.  Potential common side effects: nausea, headache, diarrhea, stomach upset.  If these become unmanageable or concerning symptoms, please make sure to call or [employer].      Go to wegovy.com to learn more and watch instruction videos.

## 2021-10-19 NOTE — NURSING NOTE
"Chief Complaint   Patient presents with     RECHECK     Follow up [facility].       Vitals:    10/19/21 0805   Weight: 78.5 kg (173 lb)   Height: 1.575 m (5' 2\")       Body mass index is 31.64 kg/m .                          Eileen Bhagat, EMT    "

## 2021-10-20 ENCOUNTER — TELEPHONE (OUTPATIENT)
Dept: ENDOCRINOLOGY | Facility: CLINIC | Age: 31
End: 2021-10-20

## 2021-10-20 NOTE — TELEPHONE ENCOUNTER
aniya to schedule 3 month follow up with Radha Alvarado, left call center phone number and sent [employer].

## 2021-10-21 NOTE — ASSESSMENT & PLAN NOTE
Continue with topiramate 25mg twice daily. Consider addressing reflux. Would like to recheck CMP with continued topiramate use. Has neuropsych coming up soon. Encouraged adding electrolyte based drink to her water intake (consumes large volumes of water daily). Discussed wegovy for future use.

## 2021-10-24 ENCOUNTER — HEALTH MAINTENANCE LETTER (OUTPATIENT)
Age: 31
End: 2021-10-24

## 2021-12-08 DIAGNOSIS — F41.1 GENERALIZED ANXIETY DISORDER: Primary | ICD-10-CM

## 2021-12-08 DIAGNOSIS — F90.2 ATTENTION DEFICIT HYPERACTIVITY DISORDER, COMBINED TYPE: ICD-10-CM

## 2021-12-08 DIAGNOSIS — F33.0 MAJOR DEPRESSIVE DISORDER, RECURRENT EPISODE, MILD (H): ICD-10-CM

## 2022-01-31 ENCOUNTER — LAB (OUTPATIENT)
Dept: LAB | Facility: OTHER | Age: 32
End: 2022-01-31
Payer: COMMERCIAL

## 2022-01-31 DIAGNOSIS — F33.0 MAJOR DEPRESSIVE DISORDER, RECURRENT EPISODE, MILD (H): ICD-10-CM

## 2022-01-31 DIAGNOSIS — F90.2 ATTENTION DEFICIT HYPERACTIVITY DISORDER, COMBINED TYPE: ICD-10-CM

## 2022-01-31 DIAGNOSIS — F41.1 GENERALIZED ANXIETY DISORDER: ICD-10-CM

## 2022-01-31 LAB
ALBUMIN SERPL-MCNC: 4.5 G/DL (ref 3.5–5.7)
ALP SERPL-CCNC: 74 U/L (ref 34–104)
ALT SERPL W P-5'-P-CCNC: 16 U/L (ref 7–52)
ANION GAP SERPL CALCULATED.3IONS-SCNC: 8 MMOL/L (ref 3–14)
AST SERPL W P-5'-P-CCNC: 15 U/L (ref 13–39)
BASOPHILS # BLD AUTO: 0 10E3/UL (ref 0–0.2)
BASOPHILS NFR BLD AUTO: 0 %
BILIRUB SERPL-MCNC: 0.8 MG/DL (ref 0.3–1)
BUN SERPL-MCNC: 9 MG/DL (ref 7–25)
CALCIUM SERPL-MCNC: 9.4 MG/DL (ref 8.6–10.3)
CHLORIDE BLD-SCNC: 105 MMOL/L (ref 98–107)
CHOLEST SERPL-MCNC: 170 MG/DL
CO2 SERPL-SCNC: 25 MMOL/L (ref 21–31)
CREAT SERPL-MCNC: 0.7 MG/DL (ref 0.6–1.2)
DEPRECATED CALCIDIOL+CALCIFEROL SERPL-MC: 22 UG/L (ref 30–100)
EOSINOPHIL # BLD AUTO: 0.2 10E3/UL (ref 0–0.7)
EOSINOPHIL NFR BLD AUTO: 3 %
ERYTHROCYTE [DISTWIDTH] IN BLOOD BY AUTOMATED COUNT: 12.1 % (ref 10–15)
FASTING STATUS PATIENT QL REPORTED: YES
GFR SERPL CREATININE-BSD FRML MDRD: >90 ML/MIN/1.73M2
GLUCOSE BLD-MCNC: 101 MG/DL (ref 70–105)
HBA1C MFR BLD: 4.6 % (ref 4–6.2)
HCT VFR BLD AUTO: 39.9 % (ref 35–47)
HDLC SERPL-MCNC: 63 MG/DL (ref 23–92)
HGB BLD-MCNC: 13.6 G/DL (ref 11.7–15.7)
IMM GRANULOCYTES # BLD: 0 10E3/UL
IMM GRANULOCYTES NFR BLD: 0 %
LDLC SERPL CALC-MCNC: 88 MG/DL
LYMPHOCYTES # BLD AUTO: 1.8 10E3/UL (ref 0.8–5.3)
LYMPHOCYTES NFR BLD AUTO: 29 %
MAGNESIUM SERPL-MCNC: 2.1 MG/DL (ref 1.9–2.7)
MCH RBC QN AUTO: 30.3 PG (ref 26.5–33)
MCHC RBC AUTO-ENTMCNC: 34.1 G/DL (ref 31.5–36.5)
MCV RBC AUTO: 89 FL (ref 78–100)
MONOCYTES # BLD AUTO: 0.5 10E3/UL (ref 0–1.3)
MONOCYTES NFR BLD AUTO: 7 %
NEUTROPHILS # BLD AUTO: 3.7 10E3/UL (ref 1.6–8.3)
NEUTROPHILS NFR BLD AUTO: 61 %
NONHDLC SERPL-MCNC: 107 MG/DL
NRBC # BLD AUTO: 0 10E3/UL
NRBC BLD AUTO-RTO: 0 /100
PLATELET # BLD AUTO: 266 10E3/UL (ref 150–450)
POTASSIUM BLD-SCNC: 4.1 MMOL/L (ref 3.5–5.1)
PROT SERPL-MCNC: 7.5 G/DL (ref 6.4–8.9)
RBC # BLD AUTO: 4.49 10E6/UL (ref 3.8–5.2)
SODIUM SERPL-SCNC: 138 MMOL/L (ref 134–144)
T3FREE SERPL-MCNC: 3.8 PG/ML (ref 2.5–3.9)
T4 FREE SERPL-MCNC: 0.78 NG/DL (ref 0.6–1.6)
TRIGL SERPL-MCNC: 94 MG/DL
TSH SERPL DL<=0.005 MIU/L-ACNC: 1.17 MU/L (ref 0.4–4)
VIT B12 SERPL-MCNC: 335 PG/ML (ref 180–914)
WBC # BLD AUTO: 6.2 10E3/UL (ref 4–11)

## 2022-01-31 PROCEDURE — 83036 HEMOGLOBIN GLYCOSYLATED A1C: CPT | Mod: ZL

## 2022-01-31 PROCEDURE — 84443 ASSAY THYROID STIM HORMONE: CPT | Mod: ZL

## 2022-01-31 PROCEDURE — 82607 VITAMIN B-12: CPT | Mod: ZL

## 2022-01-31 PROCEDURE — 83735 ASSAY OF MAGNESIUM: CPT | Mod: ZL

## 2022-01-31 PROCEDURE — 84481 FREE ASSAY (FT-3): CPT | Mod: ZL

## 2022-01-31 PROCEDURE — 80061 LIPID PANEL: CPT | Mod: ZL

## 2022-01-31 PROCEDURE — 36415 COLL VENOUS BLD VENIPUNCTURE: CPT | Mod: ZL

## 2022-01-31 PROCEDURE — 84439 ASSAY OF FREE THYROXINE: CPT | Mod: ZL

## 2022-01-31 PROCEDURE — 80053 COMPREHEN METABOLIC PANEL: CPT | Mod: ZL

## 2022-01-31 PROCEDURE — 85025 COMPLETE CBC W/AUTO DIFF WBC: CPT | Mod: ZL

## 2022-01-31 PROCEDURE — 82306 VITAMIN D 25 HYDROXY: CPT | Mod: ZL
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
## 2022-02-13 ENCOUNTER — HEALTH MAINTENANCE LETTER (OUTPATIENT)
Age: 32
End: 2022-02-13

## 2022-02-14 ENCOUNTER — OFFICE VISIT (OUTPATIENT)
Dept: FAMILY MEDICINE | Facility: OTHER | Age: 32
End: 2022-02-14
Attending: PHYSICIAN ASSISTANT
Payer: COMMERCIAL

## 2022-02-14 VITALS
HEART RATE: 99 BPM | OXYGEN SATURATION: 98 % | WEIGHT: 174.2 LBS | BODY MASS INDEX: 32.06 KG/M2 | SYSTOLIC BLOOD PRESSURE: 132 MMHG | TEMPERATURE: 97.1 F | DIASTOLIC BLOOD PRESSURE: 78 MMHG | RESPIRATION RATE: 12 BRPM | HEIGHT: 62 IN

## 2022-02-14 DIAGNOSIS — Z82.69 FAMILY HISTORY OF CONNECTIVE TISSUE DISEASE: ICD-10-CM

## 2022-02-14 DIAGNOSIS — J34.89 SORE IN NOSE: Primary | ICD-10-CM

## 2022-02-14 PROCEDURE — G0463 HOSPITAL OUTPT CLINIC VISIT: HCPCS

## 2022-02-14 PROCEDURE — 99214 OFFICE O/P EST MOD 30 MIN: CPT | Performed by: PHYSICIAN ASSISTANT

## 2022-02-14 RX ORDER — DEXTROAMPHETAMINE SACCHARATE, AMPHETAMINE ASPARTATE MONOHYDRATE, DEXTROAMPHETAMINE SULFATE AND AMPHETAMINE SULFATE 5; 5; 5; 5 MG/1; MG/1; MG/1; MG/1
20 CAPSULE, EXTENDED RELEASE ORAL DAILY
Status: ON HOLD | COMMUNITY
Start: 2022-02-11 | End: 2024-07-22

## 2022-02-14 RX ORDER — DEXTROAMPHETAMINE SULFATE, DEXTROAMPHETAMINE SACCHARATE, AMPHETAMINE SULFATE AND AMPHETAMINE ASPARTATE 1.25; 1.25; 1.25; 1.25 MG/1; MG/1; MG/1; MG/1
CAPSULE, EXTENDED RELEASE ORAL
COMMUNITY
Start: 2021-11-03 | End: 2023-09-23

## 2022-02-14 ASSESSMENT — PAIN SCALES - GENERAL: PAINLEVEL: NO PAIN (0)

## 2022-02-14 ASSESSMENT — MIFFLIN-ST. JEOR: SCORE: 1453.42

## 2022-02-14 NOTE — NURSING NOTE
Patient presents to clinic with derm concerns in nose as she has psoriasis.  Roro Mcmullen LPN ....................  2/14/2022   9:03 AM

## 2022-06-01 DIAGNOSIS — F90.2 ATTENTION DEFICIT HYPERACTIVITY DISORDER, COMBINED TYPE: ICD-10-CM

## 2022-06-01 DIAGNOSIS — F41.1 GENERALIZED ANXIETY DISORDER: Primary | ICD-10-CM

## 2022-06-01 DIAGNOSIS — F33.0 MAJOR DEPRESSIVE DISORDER, RECURRENT EPISODE, MILD (H): ICD-10-CM

## 2022-07-05 ENCOUNTER — HOSPITAL ENCOUNTER (EMERGENCY)
Facility: OTHER | Age: 32
Discharge: HOME | End: 2022-07-05
Attending: STUDENT IN AN ORGANIZED HEALTH CARE EDUCATION/TRAINING PROGRAM | Admitting: STUDENT IN AN ORGANIZED HEALTH CARE EDUCATION/TRAINING PROGRAM
Payer: COMMERCIAL

## 2022-07-05 VITALS
HEART RATE: 106 BPM | BODY MASS INDEX: 32.42 KG/M2 | TEMPERATURE: 98.6 F | RESPIRATION RATE: 22 BRPM | SYSTOLIC BLOOD PRESSURE: 124 MMHG | OXYGEN SATURATION: 100 % | HEIGHT: 62 IN | WEIGHT: 176.2 LBS | DIASTOLIC BLOOD PRESSURE: 92 MMHG

## 2022-07-05 DIAGNOSIS — T24.239A PARTIAL THICKNESS BURN OF LOWER LEG, UNSPECIFIED LATERALITY, INITIAL ENCOUNTER: ICD-10-CM

## 2022-07-05 PROCEDURE — 96375 TX/PRO/DX INJ NEW DRUG ADDON: CPT | Performed by: STUDENT IN AN ORGANIZED HEALTH CARE EDUCATION/TRAINING PROGRAM

## 2022-07-05 PROCEDURE — 250N000009 HC RX 250: Performed by: STUDENT IN AN ORGANIZED HEALTH CARE EDUCATION/TRAINING PROGRAM

## 2022-07-05 PROCEDURE — 250N000013 HC RX MED GY IP 250 OP 250 PS 637: Performed by: STUDENT IN AN ORGANIZED HEALTH CARE EDUCATION/TRAINING PROGRAM

## 2022-07-05 PROCEDURE — 99284 EMERGENCY DEPT VISIT MOD MDM: CPT | Mod: 25 | Performed by: STUDENT IN AN ORGANIZED HEALTH CARE EDUCATION/TRAINING PROGRAM

## 2022-07-05 PROCEDURE — 99283 EMERGENCY DEPT VISIT LOW MDM: CPT | Performed by: STUDENT IN AN ORGANIZED HEALTH CARE EDUCATION/TRAINING PROGRAM

## 2022-07-05 PROCEDURE — 96374 THER/PROPH/DIAG INJ IV PUSH: CPT | Performed by: STUDENT IN AN ORGANIZED HEALTH CARE EDUCATION/TRAINING PROGRAM

## 2022-07-05 PROCEDURE — 96376 TX/PRO/DX INJ SAME DRUG ADON: CPT | Performed by: STUDENT IN AN ORGANIZED HEALTH CARE EDUCATION/TRAINING PROGRAM

## 2022-07-05 PROCEDURE — 250N000011 HC RX IP 250 OP 636: Performed by: STUDENT IN AN ORGANIZED HEALTH CARE EDUCATION/TRAINING PROGRAM

## 2022-07-05 RX ORDER — OXYCODONE HYDROCHLORIDE 5 MG/1
5 TABLET ORAL EVERY 6 HOURS PRN
Qty: 12 TABLET | Refills: 0 | Status: SHIPPED | OUTPATIENT
Start: 2022-07-05 | End: 2022-07-08

## 2022-07-05 RX ORDER — ACETAMINOPHEN 325 MG/1
975 TABLET ORAL ONCE
Status: COMPLETED | OUTPATIENT
Start: 2022-07-05 | End: 2022-07-05

## 2022-07-05 RX ORDER — GINSENG 100 MG
500 CAPSULE ORAL ONCE
Status: COMPLETED | OUTPATIENT
Start: 2022-07-05 | End: 2022-07-05

## 2022-07-05 RX ORDER — ONDANSETRON 2 MG/ML
4 INJECTION INTRAMUSCULAR; INTRAVENOUS ONCE
Status: COMPLETED | OUTPATIENT
Start: 2022-07-05 | End: 2022-07-05

## 2022-07-05 RX ORDER — HYDROMORPHONE HYDROCHLORIDE 1 MG/ML
0.5 INJECTION, SOLUTION INTRAMUSCULAR; INTRAVENOUS; SUBCUTANEOUS ONCE
Status: COMPLETED | OUTPATIENT
Start: 2022-07-05 | End: 2022-07-05

## 2022-07-05 RX ORDER — VILAZODONE HYDROCHLORIDE 10 MG/1
10 TABLET ORAL DAILY
COMMUNITY
Start: 2022-06-01 | End: 2023-09-23

## 2022-07-05 RX ORDER — VILAZODONE HYDROCHLORIDE 20 MG/1
20 TABLET ORAL DAILY
COMMUNITY
Start: 2022-06-01 | End: 2023-09-23

## 2022-07-05 RX ORDER — KETOROLAC TROMETHAMINE 30 MG/ML
30 INJECTION, SOLUTION INTRAMUSCULAR; INTRAVENOUS ONCE
Status: COMPLETED | OUTPATIENT
Start: 2022-07-05 | End: 2022-07-05

## 2022-07-05 RX ADMIN — HYDROMORPHONE HYDROCHLORIDE 1 MG: 1 INJECTION, SOLUTION INTRAMUSCULAR; INTRAVENOUS; SUBCUTANEOUS at 02:07

## 2022-07-05 RX ADMIN — HYDROMORPHONE HYDROCHLORIDE 0.5 MG: 1 INJECTION, SOLUTION INTRAMUSCULAR; INTRAVENOUS; SUBCUTANEOUS at 03:44

## 2022-07-05 RX ADMIN — ONDANSETRON 4 MG: 2 INJECTION INTRAMUSCULAR; INTRAVENOUS at 02:04

## 2022-07-05 RX ADMIN — KETOROLAC TROMETHAMINE 30 MG: 30 INJECTION, SOLUTION INTRAMUSCULAR at 02:08

## 2022-07-05 RX ADMIN — ACETAMINOPHEN 975 MG: 325 TABLET ORAL at 03:44

## 2022-07-05 RX ADMIN — BACITRACIN 1 G: 500 OINTMENT TOPICAL at 02:10
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
## 2022-07-05 NOTE — DISCHARGE INSTRUCTIONS
For moderate to severe pain, alternate nsaids (e.g. Ibuprofen) every 6 hours and Tylenol every 6 hours. For example, first take Ibuprofen, then 3 hours later take Tylenol, then 3 hours later take Ibuprofen, then 3 hours later take Tylenol, and so forth. You can take up to 3200 mg of ibuprofen and 4000 mg of tylenol over a 24 hour period. A short course of oxycodone narcotic pain medication has been provided for severe break-through pain not controlled by nsaids/tylenol. Use carefully as narcotic pain medication can be addictive and dangerous (including life-threatening) if not used as prescribed. Side effects can include sedation, nausea, or constipation. For constipation use stool softener. Don't mix with alcohol, sedating drugs, or drive while taking.    Keep wounds clean and apply bacitracin daily. Please review attached instructions for additional wound care recommendations including reasons to return to the emergency department.
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
## 2022-07-05 NOTE — Clinical Note
Alba Lee was seen and treated in our emergency department on 7/5/2022.  She may return to work on 07/09/2022.  May return sooner if feels able to.     If you have any questions or concerns, please don't hesitate to call.      Logan Garcia MD

## 2022-07-15 ENCOUNTER — OFFICE VISIT (OUTPATIENT)
Dept: FAMILY MEDICINE | Facility: OTHER | Age: 32
End: 2022-07-15
Attending: NURSE PRACTITIONER
Payer: COMMERCIAL

## 2022-07-15 VITALS
BODY MASS INDEX: 33.47 KG/M2 | RESPIRATION RATE: 16 BRPM | OXYGEN SATURATION: 99 % | DIASTOLIC BLOOD PRESSURE: 94 MMHG | TEMPERATURE: 98.1 F | SYSTOLIC BLOOD PRESSURE: 142 MMHG | HEART RATE: 104 BPM | WEIGHT: 183 LBS

## 2022-07-15 DIAGNOSIS — L08.89 SECONDARY INFECTION OF SKIN: Primary | ICD-10-CM

## 2022-07-15 DIAGNOSIS — T24.201D PARTIAL THICKNESS BURN OF RIGHT LOWER EXTREMITY, SUBSEQUENT ENCOUNTER: ICD-10-CM

## 2022-07-15 PROCEDURE — 99213 OFFICE O/P EST LOW 20 MIN: CPT | Performed by: NURSE PRACTITIONER

## 2022-07-15 PROCEDURE — G0463 HOSPITAL OUTPT CLINIC VISIT: HCPCS

## 2022-07-15 RX ORDER — SILVER SULFADIAZINE 10 MG/G
CREAM TOPICAL 2 TIMES DAILY
Qty: 25 G | Refills: 1 | Status: SHIPPED | OUTPATIENT
Start: 2022-07-15 | End: 2022-07-22

## 2022-07-15 RX ORDER — CEPHALEXIN 500 MG/1
500 CAPSULE ORAL 2 TIMES DAILY
Qty: 14 CAPSULE | Refills: 0 | Status: SHIPPED | OUTPATIENT
Start: 2022-07-15 | End: 2022-07-22

## 2022-07-15 ASSESSMENT — PAIN SCALES - GENERAL: PAINLEVEL: EXTREME PAIN (8)
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
## 2022-10-15 ENCOUNTER — HEALTH MAINTENANCE LETTER (OUTPATIENT)
Age: 32
End: 2022-10-15

## 2023-03-22 ENCOUNTER — MYC MEDICAL ADVICE (OUTPATIENT)
Dept: FAMILY MEDICINE | Facility: OTHER | Age: 33
End: 2023-03-22
Payer: COMMERCIAL

## 2023-03-26 ENCOUNTER — HEALTH MAINTENANCE LETTER (OUTPATIENT)
Age: 33
End: 2023-03-26

## 2023-04-04 ASSESSMENT — PATIENT HEALTH QUESTIONNAIRE - PHQ9: SUM OF ALL RESPONSES TO PHQ QUESTIONS 1-9: 3

## 2023-06-05 ENCOUNTER — OFFICE VISIT (OUTPATIENT)
Dept: OBGYN | Facility: OTHER | Age: 33
End: 2023-06-05
Payer: COMMERCIAL

## 2023-06-05 VITALS
OXYGEN SATURATION: 98 % | SYSTOLIC BLOOD PRESSURE: 126 MMHG | DIASTOLIC BLOOD PRESSURE: 72 MMHG | BODY MASS INDEX: 33.47 KG/M2 | WEIGHT: 183 LBS | HEART RATE: 118 BPM

## 2023-06-05 DIAGNOSIS — Z12.4 CERVICAL CANCER SCREENING: Primary | ICD-10-CM

## 2023-06-05 DIAGNOSIS — Z30.09 BIRTH CONTROL COUNSELING: ICD-10-CM

## 2023-06-05 PROCEDURE — 87624 HPV HI-RISK TYP POOLED RSLT: CPT | Mod: ZL

## 2023-06-05 PROCEDURE — G0123 SCREEN CERV/VAG THIN LAYER: HCPCS

## 2023-06-05 PROCEDURE — G0463 HOSPITAL OUTPT CLINIC VISIT: HCPCS

## 2023-06-05 PROCEDURE — 99213 OFFICE O/P EST LOW 20 MIN: CPT

## 2023-06-05 RX ORDER — DESOGESTREL AND ETHINYL ESTRADIOL 0.15-0.03
1 KIT ORAL DAILY
Qty: 84 TABLET | Refills: 4 | Status: SHIPPED | OUTPATIENT
Start: 2023-06-05 | End: 2024-08-26

## 2023-06-05 RX ORDER — NORELGESTROMIN AND ETHINYL ESTRADIOL 35; 150 UG/MG; UG/MG
PATCH TRANSDERMAL
Qty: 9 PATCH | Refills: 3 | Status: SHIPPED | OUTPATIENT
Start: 2023-06-05 | End: 2023-06-05

## 2023-06-05 RX ORDER — TACROLIMUS 1 MG/G
OINTMENT TOPICAL
Status: ON HOLD | COMMUNITY
Start: 2023-05-05 | End: 2024-07-22

## 2023-06-05 ASSESSMENT — PATIENT HEALTH QUESTIONNAIRE - PHQ9
10. IF YOU CHECKED OFF ANY PROBLEMS, HOW DIFFICULT HAVE THESE PROBLEMS MADE IT FOR YOU TO DO YOUR WORK, TAKE CARE OF THINGS AT HOME, OR GET ALONG WITH OTHER PEOPLE: NOT DIFFICULT AT ALL
SUM OF ALL RESPONSES TO PHQ QUESTIONS 1-9: 4
SUM OF ALL RESPONSES TO PHQ QUESTIONS 1-9: 4

## 2023-06-05 ASSESSMENT — PAIN SCALES - GENERAL: PAINLEVEL: NO PAIN (0)
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
## 2023-06-05 NOTE — LETTER
Sabrina 15, 2023      Alba Lee  [address] [address]        Dear Alba,     I am happy to inform you that your recent cervical cancer screening test (PAP smear and HPV test) was normal.      Preventative screenings such as this help to ensure your health for years to come. You should repeat a pap smear in 5 years, unless otherwise directed.      You will still need to return to the clinic every year for your annual exam and other preventive tests.     If you have additional questions regarding this result, please call our registered nurse at [phone].          Sincerely,        OSCAR Atwood CNP

## 2023-06-05 NOTE — NURSING NOTE
Patient presents to clinic for birth control counseling and pap  /72   Pulse 118   Wt 83 kg (183 lb)   LMP 05/26/2023 (Approximate)   SpO2 98%   BMI 33.47 kg/m    Latasha Clemons LPN on 6/5/2023 at 9:31 AM

## 2023-06-08 LAB
BKR LAB AP GYN ADEQUACY: NORMAL
BKR LAB AP GYN INTERPRETATION: NORMAL
BKR LAB AP HPV REFLEX: NORMAL
BKR LAB AP LMP: NORMAL
BKR LAB AP PREVIOUS ABNORMAL: NORMAL
PATH REPORT.COMMENTS IMP SPEC: NORMAL
PATH REPORT.COMMENTS IMP SPEC: NORMAL
PATH REPORT.RELEVANT HX SPEC: NORMAL

## 2023-06-14 LAB
HUMAN PAPILLOMA VIRUS 16 DNA: NEGATIVE
HUMAN PAPILLOMA VIRUS 18 DNA: NEGATIVE
HUMAN PAPILLOMA VIRUS FINAL DIAGNOSIS: NORMAL
HUMAN PAPILLOMA VIRUS OTHER HR: NEGATIVE

## 2023-09-23 ENCOUNTER — TELEPHONE (OUTPATIENT)
Dept: BEHAVIORAL HEALTH | Facility: CLINIC | Age: 33
End: 2023-09-23

## 2023-09-23 ENCOUNTER — HOSPITAL ENCOUNTER (EMERGENCY)
Facility: CLINIC | Age: 33
Discharge: HOME | End: 2023-09-25
Attending: EMERGENCY MEDICINE | Admitting: EMERGENCY MEDICINE
Payer: COMMERCIAL

## 2023-09-23 DIAGNOSIS — Z91.148 NONCOMPLIANCE WITH MEDICATION REGIMEN: ICD-10-CM

## 2023-09-23 DIAGNOSIS — T20.27XA: ICD-10-CM

## 2023-09-23 DIAGNOSIS — F30.10 MANIC BEHAVIOR (H): ICD-10-CM

## 2023-09-23 DIAGNOSIS — F22 DELUSIONS OF PARASITOSIS (H): ICD-10-CM

## 2023-09-23 PROBLEM — F41.1 GENERALIZED ANXIETY DISORDER: Status: ACTIVE | Noted: 2023-09-23

## 2023-09-23 LAB
ALBUMIN SERPL BCG-MCNC: 3.9 G/DL (ref 3.5–5.2)
ALP SERPL-CCNC: 87 U/L (ref 35–104)
ALT SERPL W P-5'-P-CCNC: 22 U/L (ref 0–50)
AMPHETAMINES UR QL SCN: ABNORMAL
ANION GAP SERPL CALCULATED.3IONS-SCNC: 12 MMOL/L (ref 7–15)
APAP SERPL-MCNC: <5 UG/ML (ref 10–30)
AST SERPL W P-5'-P-CCNC: 20 U/L (ref 0–45)
BARBITURATES UR QL SCN: ABNORMAL
BASOPHILS # BLD AUTO: 0.1 10E3/UL (ref 0–0.2)
BASOPHILS NFR BLD AUTO: 1 %
BENZODIAZ UR QL SCN: ABNORMAL
BILIRUB SERPL-MCNC: 0.2 MG/DL
BUN SERPL-MCNC: 12.9 MG/DL (ref 6–20)
BZE UR QL SCN: ABNORMAL
CALCIUM SERPL-MCNC: 8.9 MG/DL (ref 8.6–10)
CANNABINOIDS UR QL SCN: ABNORMAL
CHLORIDE SERPL-SCNC: 102 MMOL/L (ref 98–107)
CREAT SERPL-MCNC: 0.77 MG/DL (ref 0.51–0.95)
DEPRECATED HCO3 PLAS-SCNC: 23 MMOL/L (ref 22–29)
EGFRCR SERPLBLD CKD-EPI 2021: >90 ML/MIN/1.73M2
EOSINOPHIL # BLD AUTO: 0.3 10E3/UL (ref 0–0.7)
EOSINOPHIL NFR BLD AUTO: 3 %
ERYTHROCYTE [DISTWIDTH] IN BLOOD BY AUTOMATED COUNT: 11.9 % (ref 10–15)
ETHANOL SERPL-MCNC: <0.01 G/DL
FENTANYL UR QL: ABNORMAL
GLUCOSE SERPL-MCNC: 102 MG/DL (ref 70–99)
HCG SERPL QL: NEGATIVE
HCT VFR BLD AUTO: 40.1 % (ref 35–47)
HGB BLD-MCNC: 13.1 G/DL (ref 11.7–15.7)
IMM GRANULOCYTES # BLD: 0 10E3/UL
IMM GRANULOCYTES NFR BLD: 0 %
LYMPHOCYTES # BLD AUTO: 2.9 10E3/UL (ref 0.8–5.3)
LYMPHOCYTES NFR BLD AUTO: 33 %
MCH RBC QN AUTO: 29.2 PG (ref 26.5–33)
MCHC RBC AUTO-ENTMCNC: 32.7 G/DL (ref 31.5–36.5)
MCV RBC AUTO: 90 FL (ref 78–100)
MONOCYTES # BLD AUTO: 0.7 10E3/UL (ref 0–1.3)
MONOCYTES NFR BLD AUTO: 8 %
NEUTROPHILS # BLD AUTO: 4.9 10E3/UL (ref 1.6–8.3)
NEUTROPHILS NFR BLD AUTO: 55 %
NRBC # BLD AUTO: 0 10E3/UL
NRBC BLD AUTO-RTO: 0 /100
OPIATES UR QL SCN: ABNORMAL
PCP QUAL URINE (ROCHE): ABNORMAL
PLATELET # BLD AUTO: 349 10E3/UL (ref 150–450)
POTASSIUM SERPL-SCNC: 4.2 MMOL/L (ref 3.4–5.3)
PROT SERPL-MCNC: 7.1 G/DL (ref 6.4–8.3)
RBC # BLD AUTO: 4.48 10E6/UL (ref 3.8–5.2)
SODIUM SERPL-SCNC: 137 MMOL/L (ref 136–145)
WBC # BLD AUTO: 8.9 10E3/UL (ref 4–11)

## 2023-09-23 PROCEDURE — 80143 DRUG ASSAY ACETAMINOPHEN: CPT | Performed by: EMERGENCY MEDICINE

## 2023-09-23 PROCEDURE — 82077 ASSAY SPEC XCP UR&BREATH IA: CPT | Performed by: EMERGENCY MEDICINE

## 2023-09-23 PROCEDURE — 80053 COMPREHEN METABOLIC PANEL: CPT | Performed by: EMERGENCY MEDICINE

## 2023-09-23 PROCEDURE — 85025 COMPLETE CBC W/AUTO DIFF WBC: CPT | Performed by: EMERGENCY MEDICINE

## 2023-09-23 PROCEDURE — 36415 COLL VENOUS BLD VENIPUNCTURE: CPT | Performed by: EMERGENCY MEDICINE

## 2023-09-23 PROCEDURE — 84703 CHORIONIC GONADOTROPIN ASSAY: CPT | Performed by: EMERGENCY MEDICINE

## 2023-09-23 PROCEDURE — 250N000013 HC RX MED GY IP 250 OP 250 PS 637: Performed by: EMERGENCY MEDICINE

## 2023-09-23 PROCEDURE — 80307 DRUG TEST PRSMV CHEM ANLYZR: CPT | Performed by: EMERGENCY MEDICINE

## 2023-09-23 PROCEDURE — 99285 EMERGENCY DEPT VISIT HI MDM: CPT | Mod: 25

## 2023-09-23 RX ORDER — OLANZAPINE 5 MG/1
10 TABLET, ORALLY DISINTEGRATING ORAL ONCE
Status: COMPLETED | OUTPATIENT
Start: 2023-09-23 | End: 2023-09-23

## 2023-09-23 RX ORDER — ACETAMINOPHEN 500 MG
500 TABLET ORAL EVERY 4 HOURS PRN
Status: DISCONTINUED | OUTPATIENT
Start: 2023-09-23 | End: 2023-09-23

## 2023-09-23 RX ORDER — ACETAMINOPHEN 325 MG/1
650 TABLET ORAL EVERY 6 HOURS PRN
Status: DISCONTINUED | OUTPATIENT
Start: 2023-09-23 | End: 2023-09-25 | Stop reason: HOSPADM

## 2023-09-23 RX ORDER — BUSPIRONE HYDROCHLORIDE 10 MG/1
10 TABLET ORAL 3 TIMES DAILY
Status: ON HOLD | COMMUNITY
End: 2024-07-22

## 2023-09-23 RX ORDER — DEXTROAMPHETAMINE SACCHARATE, AMPHETAMINE ASPARTATE, DEXTROAMPHETAMINE SULFATE AND AMPHETAMINE SULFATE 5; 5; 5; 5 MG/1; MG/1; MG/1; MG/1
20 TABLET ORAL DAILY PRN
Status: ON HOLD | COMMUNITY
End: 2024-07-22

## 2023-09-23 RX ORDER — BUSPIRONE HYDROCHLORIDE 10 MG/1
10 TABLET ORAL 3 TIMES DAILY
Status: DISCONTINUED | OUTPATIENT
Start: 2023-09-23 | End: 2023-09-25 | Stop reason: HOSPADM

## 2023-09-23 RX ORDER — IBUPROFEN 200 MG
400 TABLET ORAL ONCE
Status: COMPLETED | OUTPATIENT
Start: 2023-09-23 | End: 2023-09-23

## 2023-09-23 RX ORDER — VILAZODONE HYDROCHLORIDE 20 MG/1
20 TABLET ORAL DAILY
Status: ON HOLD | COMMUNITY
End: 2024-07-22

## 2023-09-23 RX ORDER — LIDOCAINE 4 G/G
1 PATCH TOPICAL ONCE
Status: COMPLETED | OUTPATIENT
Start: 2023-09-23 | End: 2023-09-24

## 2023-09-23 RX ORDER — DEXTROAMPHETAMINE SACCHARATE, AMPHETAMINE ASPARTATE, DEXTROAMPHETAMINE SULFATE AND AMPHETAMINE SULFATE 5; 5; 5; 5 MG/1; MG/1; MG/1; MG/1
20 TABLET ORAL DAILY
Status: DISCONTINUED | OUTPATIENT
Start: 2023-09-23 | End: 2023-09-23

## 2023-09-23 RX ORDER — DEXTROAMPHETAMINE SACCHARATE, AMPHETAMINE ASPARTATE MONOHYDRATE, DEXTROAMPHETAMINE SULFATE AND AMPHETAMINE SULFATE 2.5; 2.5; 2.5; 2.5 MG/1; MG/1; MG/1; MG/1
20 CAPSULE, EXTENDED RELEASE ORAL DAILY
Status: DISCONTINUED | OUTPATIENT
Start: 2023-09-23 | End: 2023-09-25 | Stop reason: HOSPADM

## 2023-09-23 RX ORDER — IBUPROFEN 600 MG/1
600 TABLET, FILM COATED ORAL EVERY 6 HOURS PRN
Status: DISCONTINUED | OUTPATIENT
Start: 2023-09-23 | End: 2023-09-25 | Stop reason: HOSPADM

## 2023-09-23 RX ADMIN — BUSPIRONE HYDROCHLORIDE 10 MG: 10 TABLET ORAL at 15:07

## 2023-09-23 RX ADMIN — DEXTROAMPHETAMINE SACCHARATE, AMPHETAMINE ASPARTATE MONOHYDRATE, DEXTROAMPHETAMINE SULFATE, AND AMPHETAMINE SULFATE 20 MG: 2.5; 2.5; 2.5; 2.5 CAPSULE, EXTENDED RELEASE ORAL at 12:45

## 2023-09-23 RX ADMIN — BUSPIRONE HYDROCHLORIDE 10 MG: 10 TABLET ORAL at 21:34

## 2023-09-23 RX ADMIN — VILAZODONE HYDROCHLORIDE 30 MG: 10 TABLET ORAL at 12:45

## 2023-09-23 RX ADMIN — IBUPROFEN 400 MG: 200 TABLET, FILM COATED ORAL at 13:04

## 2023-09-23 RX ADMIN — IBUPROFEN 600 MG: 600 TABLET ORAL at 21:34

## 2023-09-23 RX ADMIN — OLANZAPINE 10 MG: 5 TABLET, ORALLY DISINTEGRATING ORAL at 05:30

## 2023-09-23 ASSESSMENT — ACTIVITIES OF DAILY LIVING (ADL)
ADLS_ACUITY_SCORE: 33
ADLS_ACUITY_SCORE: 35
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
## 2023-09-23 NOTE — TELEPHONE ENCOUNTER
R:  No Beds within [facility].    Bed search initiated @ 11:30a.    [facility] is at capacity.              [facility] is posting 0 beds. [phone].    [facility] is posting 0 beds. Negative covid required.                 [facility] is posting 0 bed. Negative covid required. [phone]. Per call         @7:30a to [facility] -  0 Beds  [facility] is posting 0 beds.        [facility] is posting 0 beds. [phone].  Per Mariana, no beds available.   [facility] is posting 0 beds           [facility] ([facility] is posting 0 beds.

## 2023-09-23 NOTE — ED NOTES
RN ED Mental Health Handoff Note    72 hour hold    Does patient require 1:1? Yes    Hold and rights been given and documented for patient: Yes    Is the patient in BH scrubs? No -Patient refused    Has the patient been searched? Yes    Is the 15 minute observation tool up to date? Yes    Was patient issued a welcome folder? Yes    Room check completed this shift: Yes    PSS3 and [place] Assessment/Reassessment this shift:    PSS-3      Date and Time Over the past 2 weeks have you felt down, depressed, or hopeless? Over the past 2 weeks have you had thoughts of killing yourself? Have you ever attempted to kill yourself? When did this last happen? User   09/23/23 0422 no no no -- MLO          C-SSRS ([place])      Date and Time Q1 Wished to be Dead (Past Month) Q2 Suicidal Thoughts (Past Month) Q3 Suicidal Thought Method Q4 Suicidal Intent without Specific Plan Q5 Suicide Intent with Specific Plan Q6 Suicide Behavior (Lifetime) Within the Past 3 Months? RETIRED: Level of Risk per Screen Screening Not Complete User   09/23/23 0659 no no no no no -- -- -- -- ATB   09/23/23 0657 -- -- -- -- -- yes -- -- -- ATB            Behavioral status of patient: Green    Code 21 called this shift? No    Use of restraints/seclusion this shift? No    Most recent vital signs:  Temp: 97  F (36.1  C) Temp src: Temporal BP: (!) 172/95 Pulse: 106   Resp: 20 SpO2: 100 % O2 Device: None (Room air)      Medications:  Scheduled medication compliance? Yes    PRN Meds administered this shift? No    Medications   busPIRone (BUSPAR) tablet 10 mg (10 mg Oral $Given 9/23/23 1037)   vilazodone (VIIBRYD) tablet 30 mg (30 mg Oral $Given 9/23/23 1245)   Lidocaine (LIDOCARE) 4 % Patch 1 patch (1 patch Transdermal Not Given 9/23/23 1250)   amphetamine-dextroamphetamine (ADDERALL XR) ER cap 20 mg (20 mg Oral $Given 9/23/23 1245)   OLANZapine zydis (zyPREXA) ODT tab 10 mg (10 mg Oral $Given 9/23/23 6930)   ibuprofen (ADVIL/MOTRIN) tablet 400 mg (400 mg  Oral $Given 9/23/23 5148)         ADLs    Meal Provided this shift? Yes    Hygiene items provided? Yes    ADLs completed? Yes    Date of last shower: Unknown    Any significant events this shift? Patient requested to leave, patient had reassessment with DEC and was placed on 72 hour hold    Any information that would be helpful in caring for this patient?  None    Family present/updated? Yes    Location of patient's belongings: DEC    Critical Care Minutes:  Does the patient need critical care minutes documented? Yes

## 2023-09-23 NOTE — PHARMACY-ADMISSION MEDICATION HISTORY
Pharmacist Admission Medication History    Admission medication history is complete. The information provided in this note is only as accurate as the sources available at the time of the update.    Medication reconciliation/reorder completed by provider prior to medication history? No    Information Source(s): Patient's pharmacy and Clinic records via in-person    Pertinent Information:     Changes made to PTA medication list:  Added: None  Deleted: Adderall XR 5 mg   Changed: Adderall XR 10 mg to 20 mg, added Viibryd dose and frequency.     Medication Affordability:       Allergies reviewed with patient and updates made in EHR: unable to assess    Medication History Completed By: Samina Gregory [facility] 9/23/2023 8:06 AM    Prior to Admission medications    Medication Sig Last Dose Taking? Auth Provider Long Term End Date   amphetamine-dextroamphetamine (ADDERALL XR) 20 MG 24 hr capsule Take 20 mg by mouth daily Unknown Yes Reported, Patient     amphetamine-dextroamphetamine (ADDERALL) 20 MG tablet  20 mg every day prn  unknwn Yes Reported, Patient     busPIRone (BUSPAR) 10 MG tablet Take 10 mg by mouth 3 times daily Unknown Yes Unknown, Entered By History Yes    desogestrel-ethinyl estradiol (APRI) 0.15-30 MG-MCG tablet Take 1 tablet by mouth daily Unknown Yes Fela Gramajo L, APRN CNP Yes    tacrolimus (PROTOPIC) 0.1 % external ointment APPLY IN AFFECTED EAR(S) TWICE DAILY ON DAYS NOT USING CUTICORT Unknown Yes Reported, Patient     VIIBRYD 10 MG TABS tablet Take 10 mg by mouth daily TAKE 1 TABLET BY MOUTH ONCE DAILY ALONG WITH 20MG TABLET FOR A TOTAL DAILY DOSE OF 30MG DAILY. Unknown Yes Reported, Patient Yes    VIIBRYD 20 MG TABS tablet Take 20 mg by mouth daily TAKE 1 TABLET BY MOUTH ONCE DAILY ALONG WITH 20MG TABLET FOR A TOTAL DAILY DOSE OF 30MG DAILY. Unknown Yes Reported, Patient Yes    vitamin D3 (CHOLECALCIFEROL) 50 mcg (2000 units) tablet Take 1 tablet by mouth daily Unknown Yes Reported, Patient
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
## 2023-09-23 NOTE — DISCHARGE INSTRUCTIONS
Aftercare Plan      Recommendations:  Coordinators from [facility] ([facility]) will be calling you in the next 24-48 hours to ensure that you have the resources you need. For additonal need of mental health services, you can also contact [facility] coordinators directly at [phone].    1) Follow up with scheduled therapy appointment   2) Continue to follow up with medication management at [facility]        If I am feeling unsafe or I am in a crisis, I will:   - Contact my established care providers   - Call the [place] Suicide Prevention Lifeline: [phone]   - [place] CRISIS TEXT Line 287520  - Go to the nearest emergency room   - Call 911 or 988 or 211    Warning signs that I or other people might notice when a crisis is developing for me:    - persistent worsening of depression and anxiety  - extreme feelings of shame or guilt  - feeling like there is no way out  - intrusive thoughts of suicide or self harm    Things that help me to feel better:  - engaging in personal hobby or interests  - frequent self care activities  - spending quality time with friends or family  - listen to soothing music     People and social settings that provide distraction:  - spending quality time with trusted friend  - asking for help as needed  - spending time with pets    Things I can use or do for distraction:   - watching a move or TV  - reading a book  - listening to music  - take a nature walk  - meditating or breathing  - art project    People whom I can ask for help:  - trusted family member, coworker, or friend  - Crisis   - outpatient providers    Your [facility] has a mental health crisis team you can call 24/7: [facility] Mobile Crisis  [phone] (adults)  [phone] (children)    [place] Trauma Project (directory of trauma specific therapists)  https://www.[place]raumaproject.org/    Crisis Lines  Crisis Text Line  Text 165398  You will be connected with a trained live crisis counselor to  provide support.    Por espanol, texto  ARETHA a 917526 o texto a 442-AYUDAME en WhatKelly    [place] Hope Line  1.800.SUICIDE [0724514]    Additional Information  Today you were seen by a licensed mental health professional through Triage and Transition services, [facility] ([facility])  for a crisis assessment in the Emergency Department at [facility].  It is recommended that you follow up with your established providers (psychiatrist, mental health therapist, and/or primary care doctor - as relevant) as soon as possible.     Coordinators from [facility] will be calling you in the next 24-48 hours to ensure that you have the resources you need.  You can also contact [facility] coordinators directly at [phone]. You may have been scheduled for or offered an appointment with a mental health provider. [facility] maintains an extensive network of licensed behavioral health providers to connect patients with the services they need.  We do not charge providers a fee to participate in our referral network.  We match patients with providers based on a patient's specific needs, insurance coverage, and location.  Our first effort will be to refer you to a provider within your care system, and will utilize providers outside your care system as needed.       Below is a list of FREE Mental Health Options in the [facility] Area:  [facility] ([facility])  Serves those in emotional crisis with 24-hour, seven-day-a-week crisis counseling, assessment, referral, and medication management.   Suicidal: [phone] Consultation: [phone]  [address] [place] [address] 24/7 Crisis Intervention Center     [facility]  [phone]  Serves those in need of free outpatient mental health care  Hours: Mon, Wed, Fri 1-3pm; Mon-Thurs 6:30-8:30pm    [facility]  [address] [place] [address]  [phone]

## 2023-09-23 NOTE — ED NOTES
IP MH Referral Acuity Rating Score (RARS)    LMHP complete at referral to IP MH, with DEC; and, daily while awaiting IP MH placement. Call score to PPS.  CRITERIA SCORING   New 72 HH and Involuntary for IP MH (not adolescent) 0/1   Boarding over 24 hours 0/1   Vulnerable adult at least 55+ with multiple co morbidities; or, Patient age 11 or under 0/1   Suicide ideation without relief of precipitating factors 0/1   Current plan for suicide 0/1   Current plan for homicide 0/1   Imminent risk or actual attempt to seriously harm another without relief of factors precipitating the attempt 0/1   Severe dysfunction in daily living (ex: complete neglect for self care, extreme disruption in vegetative function, extreme deterioration in social interactions) 1/1   Recent (last 2 weeks) or current physical aggression in the ED 0/1   Restraints or seclusion episode in ED 0/1   Verbal aggression, agitation, yelling, etc., while in the ED 0/1   Active psychosis with psychomotor agitation or catatonia 1/1   Need for constant or near constant redirection (from leaving, from others, etc).  0/1   Intrusive or disruptive behaviors 0/1   TOTAL Acuity Total Score: 2
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
## 2023-09-24 ENCOUNTER — TELEPHONE (OUTPATIENT)
Dept: BEHAVIORAL HEALTH | Facility: CLINIC | Age: 33
End: 2023-09-24
Payer: COMMERCIAL

## 2023-09-24 LAB
FLUAV RNA SPEC QL NAA+PROBE: NEGATIVE
FLUBV RNA RESP QL NAA+PROBE: NEGATIVE
RSV RNA SPEC NAA+PROBE: NEGATIVE
SARS-COV-2 RNA RESP QL NAA+PROBE: NEGATIVE

## 2023-09-24 PROCEDURE — 250N000013 HC RX MED GY IP 250 OP 250 PS 637: Performed by: EMERGENCY MEDICINE

## 2023-09-24 PROCEDURE — 87637 SARSCOV2&INF A&B&RSV AMP PRB: CPT | Performed by: EMERGENCY MEDICINE

## 2023-09-24 RX ORDER — DIPHENHYDRAMINE HCL 25 MG
25 CAPSULE ORAL ONCE
Status: COMPLETED | OUTPATIENT
Start: 2023-09-24 | End: 2023-09-24

## 2023-09-24 RX ORDER — OLANZAPINE 5 MG/1
10 TABLET, ORALLY DISINTEGRATING ORAL 2 TIMES DAILY PRN
Status: DISCONTINUED | OUTPATIENT
Start: 2023-09-24 | End: 2023-09-25 | Stop reason: HOSPADM

## 2023-09-24 RX ORDER — NICOTINE 21 MG/24HR
1 PATCH, TRANSDERMAL 24 HOURS TRANSDERMAL EVERY 24 HOURS
Status: DISCONTINUED | OUTPATIENT
Start: 2023-09-24 | End: 2023-09-25 | Stop reason: HOSPADM

## 2023-09-24 RX ADMIN — BUSPIRONE HYDROCHLORIDE 10 MG: 10 TABLET ORAL at 20:56

## 2023-09-24 RX ADMIN — OLANZAPINE 10 MG: 5 TABLET, ORALLY DISINTEGRATING ORAL at 12:02

## 2023-09-24 RX ADMIN — DEXTROAMPHETAMINE SACCHARATE, AMPHETAMINE ASPARTATE MONOHYDRATE, DEXTROAMPHETAMINE SULFATE, AND AMPHETAMINE SULFATE 20 MG: 2.5; 2.5; 2.5; 2.5 CAPSULE, EXTENDED RELEASE ORAL at 10:53

## 2023-09-24 RX ADMIN — NICOTINE 1 PATCH: 14 PATCH, EXTENDED RELEASE TRANSDERMAL at 02:47

## 2023-09-24 RX ADMIN — IBUPROFEN 600 MG: 600 TABLET ORAL at 04:46

## 2023-09-24 RX ADMIN — BUSPIRONE HYDROCHLORIDE 10 MG: 10 TABLET ORAL at 08:21

## 2023-09-24 RX ADMIN — IBUPROFEN 600 MG: 600 TABLET ORAL at 21:00

## 2023-09-24 RX ADMIN — NICOTINE POLACRILEX 2 MG: 2 GUM, CHEWING BUCCAL at 16:34

## 2023-09-24 RX ADMIN — NICOTINE POLACRILEX 2 MG: 2 GUM, CHEWING BUCCAL at 21:00

## 2023-09-24 RX ADMIN — ACETAMINOPHEN 650 MG: 325 TABLET, FILM COATED ORAL at 00:50

## 2023-09-24 RX ADMIN — VILAZODONE HYDROCHLORIDE 30 MG: 10 TABLET ORAL at 08:21

## 2023-09-24 RX ADMIN — IBUPROFEN 600 MG: 600 TABLET ORAL at 10:26

## 2023-09-24 RX ADMIN — BUSPIRONE HYDROCHLORIDE 10 MG: 10 TABLET ORAL at 13:38

## 2023-09-24 RX ADMIN — DIPHENHYDRAMINE HYDROCHLORIDE 25 MG: 25 CAPSULE ORAL at 10:22

## 2023-09-24 ASSESSMENT — ACTIVITIES OF DAILY LIVING (ADL)
ADLS_ACUITY_SCORE: 35

## 2023-09-24 NOTE — CONSULTS
Diagnostic Evaluation Consultation  Crisis Re-Assessment    Patient Name: Alba Lee  Age:  33 year old  Legal Sex: female  Gender Identity: female  Pronouns:   Race: White  Ethnicity: Not  or   Language: English      Patient was assessed: Virtual: [facility] Crisis Assessment Start Time:  (5:30 pm) Crisis Assessment Stop Time:  (6:00 pm)  Patient location: [facility] EMERGENCY DEPT                             ED17  Date of original assessment: 9/23/23; completed by CIARA Medina    Referral Data and Chief Complaint  Alba Lee presents to the ED by  self. Patient is presenting to the ED for the following concerns: Health stressors.   Factors that make the mental health crisis life threatening or complex are:  Patient presented with disorganized thoughts and did not provide safety discharge plan.    Assessment Methods  Assessment methods included conducting a formal interview with patient, review of medical records, collaboration with medical staff, and obtaining relevant collateral information from family and community providers when available.  : unable to complete     Patient response to interventions: no evidence of understanding  Coping skills were attempted to reduce the crisis:  None reported by patient     History of the Crisis    (Patient has history of receiving mental health services in [facility].  Patient did not remember names of providers.)    Changes since last assessment  Patient is more irritable and was placed on 72 hour hold    Significant Clinical History  Current Anxiety Symptoms:  excessive worry, anxious  Current Depression/Trauma:  difficulty concentrating, impaired decision making  Current Somatic Symptoms:  anxious  Current Psychosis/Thought Disturbance:  distractability, hyperverbal, impulsive  Current Eating Symptoms:  increased appetite  Chemical Use History:  Alcohol: None  Benzodiazepines: None  Opiates: None  Cocaine:  None  Marijuana: None  Other Use: None  Withdrawal Symptoms:  (None)  Addictions:  (None reported)   Past diagnosis:  Anxiety Disorder, Depression, PTSD, Personality Disorder  Family history:  Depression, Anxiety Disorder  Past treatment:  Individual therapy, Psychiatric Medication Management, Inpatient Hospitalization  Details of most recent treatment:  She recently has been working with Aicha Escobar (psychiatry) at [facility].  Other relevant history:          Collateral Information  Is there collateral information: No     Collateral information name, relationship, phone number:       What happened today:       What is different about patient's functioning:       Concern about alcohol/drug use:      What do you think the patient needs:      Has patient made comments about wanting to kill themselves/others:      If d/c is recommended, can they take part in safety/aftercare planning:       Additional collateral information:        Risk Assessment  [place] Suicide Severity Rating Scale Recent (completed since last contact):   Suicidal Ideation (Recent)  Q1 Wished to be Dead (Past Month): no  Q2 Suicidal Thoughts (Past Month): no  Q3 Suicidal Thought Method: no  Q4 Suicidal Intent without Specific Plan: no  Q5 Suicide Intent with Specific Plan: no  Level of Risk per Screen: low risk     Suicidal Behavior (Recent)  Actual Attempt (Past 3 Months): No  Total Number of Actual Attempts (Past 3 Months): 0  Has subject engaged in non-suicidal self-injurious behavior? (Past 3 Months): No  Interrupted Attempts (Past 3 Months): No  Total Number of Interrupted Attempts (Past 3 Months): 0  Aborted or Self-Interrupted Attempt (Past 3 Months): No  Total Number of Aborted or Self-Interrupted Attempts (Past 3 Months): 0  Preparatory Acts or Behavior (Past 3 Months): No  Total Number of Preparatory Acts (Past 3 Months): 0    Environmental or Psychosocial Events: challenging interpersonal relationships,  unemployment/underemployment, other life stressors, ongoing abuse of substances  Protective Factors: Protective Factors: responsibilities and duties to others, including pets and children, help seeking, sense of belonging, optimistic outlook - identification of future goals, constructive use of leisure time, enjoyable activities, resilience    Does the patient have thoughts of harming others? Feels Like Hurting Others: no  Previous Attempt to Hurt Others: no  Current presentation: Confused  Violence Threats in Past 6 Months: no  Current Violence Plan or Thoughts: no  Is the patient engaging in sexually inappropriate behavior?: no  Duty to warn initiated: no    Is the patient engaging in sexually inappropriate behavior?  no        Mental Status Exam   Affect: Dramatic  Appearance: Inappropriate  Attention Span/Concentration: Inattentive  Eye Contact: Variable    Fund of Knowledge: Delayed   Language /Speech Content: Expressive Speech  Language /Speech Volume: Loud  Language /Speech Rate/Productions: Hyperverbal  Recent Memory: Variable  Remote Memory: Variable  Mood: Irritable  Orientation to Person: Yes   Orientation to Place: Yes  Orientation to Time of Day: Yes  Orientation to Date: Yes     Situation (Do they understand why they are here?): Yes  Psychomotor Behavior: Normal  Thought Content: Delusions, Paranoia  Thought Form: Flight of Ideas      Medication  Psychotropic medications:   Medication Orders - Psychiatric (From admission, onward)      Start     Dose/Rate Route Frequency Ordered Stop    09/23/23 1400  busPIRone (BUSPAR) tablet 10 mg         10 mg Oral 3 TIMES DAILY 09/23/23 1012      09/23/23 1200  amphetamine-dextroamphetamine (ADDERALL XR) ER cap 20 mg         20 mg Oral DAILY 09/23/23 1159      09/23/23 1015  vilazodone (VIIBRYD) tablet 30 mg         30 mg Oral DAILY 09/23/23 1012               Current Care Team  Patient Care Team:  Renay Castorena PA-C as PCP - General (Family Medicine)  Marcus  Mikki AKBAR NP as Assigned PCP    Diagnosis  Patient Active Problem List   Diagnosis Code    Hepatitis K75.9    Depression with anxiety F41.8    Obesity, Class I, BMI 30-34.9 E66.9    Recurrent major depressive disorder, in full remission (H) F33.42    Psoriasis L40.9    Delusions of parasitosis (H) F22    Generalized anxiety disorder F41.1       Primary Problem This Admission  Active Hospital Problems    *Delusions of parasitosis (H)      Generalized anxiety disorder        Clinical Summary and Substantiation of Recommendations   Patient presented with delusions and paranoia.  She did not contract for safety          Severe psychiatric, behavioral or other comorbid conditions are appropriate for management at inpatient mental health as indicated by at least one of the following: Psychiatric Symptoms, Impaired impulse control, judgement, or insight  Severe dysfunction in daily living is present as indicated by at least one of the following: Extreme deterioration in social interactions, Complete neglect of self care with associated impairment in physical status, Complete inability to maintain any appropriate aspect of personal responsibility in any adult roles  Situation and expectations are appropriate for inpatient care: Around-the-clock medical and nursing care to address symptoms and initiate intervention is required, Patient management/treatment at lower level of care is not feasible or is inappropriate, Voluntary treatment at lower level of care is not feasible  Inpatient mental health services are necessary to meet patient needs and at least one of the following: Specific condition related to admission diagnosis is present and judged likely to further improve at proposed level of care, Specific condition related to admission diagnosis is present and judged likely to deteriorate in absence of treatment at proposed level of care      Patient coping skills attempted to reduce the crisis:  None reported by  patient    Disposition  Recommended disposition: Inpatient Mental Health        Reviewed case and recommendations with attending provider. Attending Name: Dr. Higginbotham       Attending concurs with disposition: yes       Patient and/or validated legal guardian concurs with disposition:   yes       Final disposition:  inpatient mental health    Legal status on admission: Voluntary/Patient has signed consent for treatment    Assessment Details   Total duration spent on the patient case in minutes:  (30 minutes)     CPT code(s) utilized: 82457 - Psychotherapy for Crisis - 60 (30-74*) min    CIARA Li, Psychotherapist  DEC - Triage & Transition Services  Callback: No induration.  No swelling.  No redness.  [phone]

## 2023-09-24 NOTE — TELEPHONE ENCOUNTER
No appropriate beds are currently available within the  system. Bed search update @ 12AM:        [facility]: @ cap per website. Per Zay @ 00:03 they are full  [facility]: @ cap per website  [facility]: @ cap per website. Per Alejandra @ 00:04, they are already reviewing a few referrals   [facility]: @ cap per website  [facility]: @ cap per website  [facility]: @ cap per website  [place]: @ cap per website  [facility]: @ cap per website  [facility]: @ cap per website  [facility]: Posting 5 beds. Mixed unit/Low acuity only. Requires Covid test for review  [facility]: @ cap per website  [facility]: @ cap per website  [facility]: @ cap per website  Replaced by [facility]: @ cap per website. Does not review after 10PM.    [facility]: Posting beds at all 2 locations. Per Buddy @ 00:07, [place] has 1 very low acuity bed on their mood d/o unit; [place] is reviewing to capacity and [place] has low acuity beds only  [facility]: Posting 3 beds (No hx of aggression. No sexual offenders. Voluntary patients only). Meets exclusionary d/t 72 HH  [facility]: Posting 8 beds (Low acuity only. Must have the cognitive ability to do programming. No aggressive or violent behavior or recent HX in the last 2 yrs. MH must be primary). Pt not appropriate d/t acuity  [facility]: @ cap per website  [facility]: @ cap per website. Low acuity, Neg Covid.  [facility]: Posting 4 beds (Covid neg. Voluntary only. Mixed unit. No aggressive or violent behavior. No registered sex offenders). Meets exclusionary d/t 72 HH  [facility]: Posting 2 beds (No hx of aggression/assault. No lines, drains or tubes. Does not provide detox or CD treatment). Per Edna @ 01:59 they require a Covid test  [facility]: Posting 24 beds. Facility is in [place]. Pt is on a [place] 72 HH; cannot be placed in [place]  [facility]: Posting 5 beds (Mixed  unit/Low acuity). Per call @ 00:09 they have 1 bed on general/low acuity unit and 1 male bed on high acuity, which is case-by-case        Pt remains on work list until appropriate placement is available    Called ED @ 02:18 to request Covid for outside reviews

## 2023-09-24 NOTE — TELEPHONE ENCOUNTER
R:  No appropriate [facility] beds available.    [facility] Access Inpatient Bed Call Log 9/23/23 @3:13 PM   Intake has called facilities that have not updated the bed status within the last 12 hours.                                 [facility] is at capacity.               [facility] is posting 0 beds. [phone].     [facility] is posting 0 beds. Negative covid required.                  [facility] is posting 0 bed. Negative covid required. [phone]. Per call          @7:30a to [facility] -  0 Beds   [facility] is posting 0 beds.         [facility] is posting 0 beds. [phone].  Per Mariana, no beds available.    [facility] is posting 0 beds            [facility] is posting 0 beds.    [facility] is posting 0 beds. Low acuity only.               Pt remains on the work list pending appropriate bed availability.

## 2023-09-25 ENCOUNTER — TELEPHONE (OUTPATIENT)
Dept: BEHAVIORAL HEALTH | Facility: CLINIC | Age: 33
End: 2023-09-25
Payer: COMMERCIAL

## 2023-09-25 VITALS
TEMPERATURE: 98.1 F | WEIGHT: 159 LBS | SYSTOLIC BLOOD PRESSURE: 134 MMHG | HEIGHT: 62 IN | OXYGEN SATURATION: 98 % | RESPIRATION RATE: 18 BRPM | BODY MASS INDEX: 29.26 KG/M2 | DIASTOLIC BLOOD PRESSURE: 88 MMHG | HEART RATE: 105 BPM

## 2023-09-25 PROCEDURE — 250N000013 HC RX MED GY IP 250 OP 250 PS 637: Performed by: EMERGENCY MEDICINE

## 2023-09-25 RX ORDER — DIPHENHYDRAMINE HCL 25 MG
25 CAPSULE ORAL ONCE
Status: COMPLETED | OUTPATIENT
Start: 2023-09-25 | End: 2023-09-25

## 2023-09-25 RX ADMIN — ACETAMINOPHEN 650 MG: 325 TABLET, FILM COATED ORAL at 15:36

## 2023-09-25 RX ADMIN — VILAZODONE HYDROCHLORIDE 30 MG: 10 TABLET ORAL at 09:28

## 2023-09-25 RX ADMIN — BUSPIRONE HYDROCHLORIDE 10 MG: 10 TABLET ORAL at 15:22

## 2023-09-25 RX ADMIN — OLANZAPINE 10 MG: 5 TABLET, ORALLY DISINTEGRATING ORAL at 11:24

## 2023-09-25 RX ADMIN — IBUPROFEN 600 MG: 600 TABLET ORAL at 17:52

## 2023-09-25 RX ADMIN — DEXTROAMPHETAMINE SACCHARATE, AMPHETAMINE ASPARTATE MONOHYDRATE, DEXTROAMPHETAMINE SULFATE, AND AMPHETAMINE SULFATE 20 MG: 2.5; 2.5; 2.5; 2.5 CAPSULE, EXTENDED RELEASE ORAL at 09:29

## 2023-09-25 RX ADMIN — BUSPIRONE HYDROCHLORIDE 10 MG: 10 TABLET ORAL at 09:26

## 2023-09-25 RX ADMIN — IBUPROFEN 600 MG: 600 TABLET ORAL at 12:22

## 2023-09-25 RX ADMIN — ACETAMINOPHEN 650 MG: 325 TABLET, FILM COATED ORAL at 10:52

## 2023-09-25 RX ADMIN — DIPHENHYDRAMINE HYDROCHLORIDE 25 MG: 25 CAPSULE ORAL at 09:26

## 2023-09-25 ASSESSMENT — ACTIVITIES OF DAILY LIVING (ADL)
ADLS_ACUITY_SCORE: 35

## 2023-09-25 NOTE — ED NOTES
Wound care for redded area posterior neck. Rough, reddened, blanchable. Appearance of eczema  Pt has hx eczema

## 2023-09-25 NOTE — TELEPHONE ENCOUNTER
No appropriate beds are currently available within the  system. Bed search update (Statewide) @ 12AM:        [facility]: @ cap per website. Per Maxine @ 00:03 they are full  [facility]: @ cap per website  [facility]: @ cap per website. Per Alejandra @ 00:04, only low acuity beds on their step-down unit available - cannot complete any reviews until after 8AM and reviewing a few referrals already.   [facility]: @ cap per website  [facility]: @ cap per website  [facility]: @ cap per website  [facility]: @ cap per website  [facility]: @ cap per website  [facility]: @ cap per website  [facility]: @ cap per website  [facility]: @ cap per website  [facility]: @ cap per website  [facility]: @ cap per website  [facility]: Does not review after 10PM.    [facility]: Posting beds in [place] and [place]. Per Anderson @ 00:06, only very low acuity beds in [place] as they have high unit acuity - Based on screening questions, pt not appropriate for current beds avail  [facility]: Posting 3 beds (No hx of aggression. No sexual offenders. Voluntary patients only). Meets exclusionary d/t 72 HH   [facility]: Posting 8 beds (Low acuity only. Must have the cognitive ability to do programming. No aggressive or violent behavior or recent HX in the last 2 yrs. MH must be primary).  [facility]: @ cap per website  [facility]: @ cap per website. Low acuity, Neg Covid.  [facility]: Posting 4 beds (Covid neg. Voluntary only. Mixed unit. No aggressive or violent behavior. No registered sex offenders). Meets exclusionary d/t 72 HH   [facility]: @ cap per website (No hx of aggression/assault. No lines, drains or tubes. Does not provide detox or CD treatment).  [facility]: Posting 16 beds. [place]. [place] a [place] 72 HH; cannot be placed in [place]   [facility]: Posting 2 beds (Mixed unit/Low acuity). Per  Heaven @ 00:13, no appropriate beds available tonight (no female beds) - Unit has very high acuity.          Pt remains on work list until appropriate placement is available
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
## 2023-09-25 NOTE — TELEPHONE ENCOUNTER
R:  Covid = Negative    No appropriate [facility] beds available.    [facility] Access Inpatient Bed Call Log 9/24/23  @ 3:45 PM    Intake has called facilities that have not updated the bed status within the last 12 hours.                                     [facility] is at capacity.                 [facility] is posting 0 beds. [phone].       [facility] is posting 0 beds. Negative covid required.                    [facility] is posting 0 bed. Negative covid required. [phone].       [facility]  posting 0 Beds           [facility] is posting 0 beds. [phone].  [facility] -NO BEDs    [facility] is posting 0 beds.      [facility] is posting 0 beds. Low acuity only.             [facility] is posting 4 beds. LOW acuity ONLY. Mixed unit 12+. Negative covid- [phone].        [facility] has 0 beds posted. No aggression. Negative Covid. Low acuity.     [facility] is posting 0 beds. Negative covid. [phone].        [facility] is posting 1 beds. Low acuity only. Negative covid.            [phone].       [facility] is posting 0 bed. Low acuity. No current aggression.        [facility] is posting 1 bed available. Negative covid.  [phone].       [facility] ([place]) is posting 6 beds. Low acuity only. Negative covid.  [phone].       [facility] is posting 0 beds. Low acuity. 72 HH hold            preferred. Negative covid required. [phone]       [facility] in [place] is posting 3  beds.  Negative covid required.   Vol only, No history of aggression, violence, or assault. No sexual offenders. No 72 HH holds. [phone].             [facility] is posting 8 beds. Negative covid required.  (Must have the cognitive ability to do programming. No aggressive or violent behavior or  recent HX in the last 2 yrs. MH must be primary.) Always low acuity. [phone]       [facility] has 0 beds posted. Negative covid required.  Low acuity only. Violence and aggression capped.  [phone]. Low acuity only.        [facility] is posting 0 bed. Low acuity, Negative covid required.  [phone].      [facility] is posting 4 beds. Negative covid required.  Vol only. Combined adolescent and adult unit. No aggressive or violent behavior. No registered sex offenders.  [phone].         [facility] posting  0 beds Negative covid required.   [phone]       [facility] is posting 2 bed. Negative covid. LOW acuity. (No lines, drains, or tubes, oxygen, CPAP, IV, etc.). [phone].       [facility] is posting 16 beds. No covid test required.  [phone].       [facility] ([facility]) is posting 5 beds. Negative covid. (No. lines, drains, or tubes, oxygen, CPAP, IV, etc.). [phone].                Pt remains on the work list pending appropriate bed availability.           11:01 PM Talked to [facility] ED RN and Pt is still SI but asking for a reassessment with [facility].  [facility] is unable to do an assessment at this time and will reassess in the AM.

## 2023-09-25 NOTE — ED NOTES
Throughout shift, Pt has been calm, cooperative and appropriate. Pt requested to speak with [facility] for reassessment, they were contacted and stated they wouldn't be able to reach out until 9/25/23 to reconnect with the Pt.

## 2023-09-26 NOTE — TELEPHONE ENCOUNTER
"7:01 PM Intake received message from EC \" EP yoselyn adult can be taken of work list, will discharge.\" Patient removed from [facility] intake no longer following for placement. PPS following notified.    "
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
## 2023-09-28 DIAGNOSIS — L08.89 SECONDARY INFECTION OF SKIN: ICD-10-CM

## 2023-09-28 DIAGNOSIS — T24.201D PARTIAL THICKNESS BURN OF RIGHT LOWER EXTREMITY, SUBSEQUENT ENCOUNTER: ICD-10-CM

## 2023-10-02 DIAGNOSIS — T24.201D PARTIAL THICKNESS BURN OF RIGHT LOWER EXTREMITY, SUBSEQUENT ENCOUNTER: ICD-10-CM

## 2023-10-02 DIAGNOSIS — L08.89 SECONDARY INFECTION OF SKIN: ICD-10-CM

## 2023-10-04 RX ORDER — SILVER SULFADIAZINE 10 MG/G
CREAM TOPICAL
Qty: 25 G | Refills: 1 | OUTPATIENT
Start: 2023-10-04

## 2023-10-04 NOTE — TELEPHONE ENCOUNTER
Attempted reaching Pt, for more information. Voicemail full and not accepting messages. Will try again later. Mariana Mayers RN .............. 10/4/2023  3:57 PM

## 2023-10-04 NOTE — TELEPHONE ENCOUNTER
Ignacio sent Rx request for the following:      Requested Prescriptions   Pending Prescriptions Disp Refills    silver sulfADIAZINE (SILVADENE) 1 % external cream [Pharmacy Med Name: SILVER SULFADIAZINE 1% CREAM 25GM] 25 g 1     Sig: APPLY TOPICALLY TO THE AFFECTED AREA TWICE DAILY FOR 7 DAYS       There is no refill protocol information for this order     Last Prescription Date:   7/15/22  Last Fill Qty/Refills:         25 g, R-1    Last Office Visit:              2/14/22   Future Office visit:           none     Batsheva Donohue RN on 10/4/2023 at 10:39 AM
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
## 2023-10-06 DIAGNOSIS — L08.89 SECONDARY INFECTION OF SKIN: ICD-10-CM

## 2023-10-06 DIAGNOSIS — T24.201D PARTIAL THICKNESS BURN OF RIGHT LOWER EXTREMITY, SUBSEQUENT ENCOUNTER: ICD-10-CM

## 2023-10-06 RX ORDER — SILVER SULFADIAZINE 10 MG/G
CREAM TOPICAL
Qty: 25 G | Refills: 1 | OUTPATIENT
Start: 2023-10-06

## 2023-10-06 NOTE — TELEPHONE ENCOUNTER
3rd failed attempt to reach Pt. Writer will close encounter, and follow up as appropriate. Mariana Mayers RN .............. 10/6/2023  9:29 AM
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
## 2023-10-06 NOTE — TELEPHONE ENCOUNTER
Duplicate request. Refused per Renay Castorena note on 10/4/23. Rae Arciniega RN on 10/6/2023 at 8:50 AM

## 2023-10-25 ENCOUNTER — TELEPHONE (OUTPATIENT)
Dept: BEHAVIORAL HEALTH | Facility: CLINIC | Age: 33
End: 2023-10-25

## 2023-10-25 ENCOUNTER — HOSPITAL ENCOUNTER (EMERGENCY)
Facility: OTHER | Age: 33
Discharge: PSYCHIATRIC OR SUBSTANCE USE FACILITY | End: 2023-10-26
Attending: PHYSICIAN ASSISTANT | Admitting: PHYSICIAN ASSISTANT
Payer: COMMERCIAL

## 2023-10-25 ENCOUNTER — APPOINTMENT (OUTPATIENT)
Dept: GENERAL RADIOLOGY | Facility: OTHER | Age: 33
End: 2023-10-25
Attending: PHYSICIAN ASSISTANT
Payer: COMMERCIAL

## 2023-10-25 DIAGNOSIS — M47.812 CERVICAL OSTEOARTHRITIS: ICD-10-CM

## 2023-10-25 DIAGNOSIS — M62.838 NECK MUSCLE SPASM: ICD-10-CM

## 2023-10-25 DIAGNOSIS — R44.3 HALLUCINATIONS: Primary | ICD-10-CM

## 2023-10-25 DIAGNOSIS — M50.30 DDD (DEGENERATIVE DISC DISEASE), CERVICAL: ICD-10-CM

## 2023-10-25 DIAGNOSIS — F30.9 MANIA (H): ICD-10-CM

## 2023-10-25 DIAGNOSIS — L40.9 PSORIASIS: ICD-10-CM

## 2023-10-25 PROBLEM — F90.9 ATTENTION DEFICIT HYPERACTIVITY DISORDER: Status: ACTIVE | Noted: 2023-10-25

## 2023-10-25 PROBLEM — F12.90 CANNABIS USE, UNSPECIFIED, UNCOMPLICATED: Status: ACTIVE | Noted: 2023-10-25

## 2023-10-25 PROBLEM — F33.3 SEVERE RECURRENT MAJOR DEPRESSION WITH PSYCHOTIC FEATURES (H): Status: ACTIVE | Noted: 2023-10-25

## 2023-10-25 LAB
ALBUMIN SERPL BCG-MCNC: 4.8 G/DL (ref 3.5–5.2)
ALBUMIN UR-MCNC: 70 MG/DL
ALP SERPL-CCNC: 77 U/L (ref 35–104)
ALT SERPL W P-5'-P-CCNC: 26 U/L (ref 0–50)
AMPHETAMINES UR QL SCN: ABNORMAL
ANION GAP SERPL CALCULATED.3IONS-SCNC: 14 MMOL/L (ref 7–15)
APPEARANCE UR: CLEAR
AST SERPL W P-5'-P-CCNC: 25 U/L (ref 0–45)
BARBITURATES UR QL SCN: ABNORMAL
BASOPHILS # BLD AUTO: 0 10E3/UL (ref 0–0.2)
BASOPHILS NFR BLD AUTO: 0 %
BENZODIAZ UR QL SCN: ABNORMAL
BILIRUB SERPL-MCNC: 0.5 MG/DL
BILIRUB UR QL STRIP: NEGATIVE
BUN SERPL-MCNC: 6 MG/DL (ref 6–20)
BZE UR QL SCN: ABNORMAL
CALCIUM SERPL-MCNC: 9.5 MG/DL (ref 8.6–10)
CANNABINOIDS UR QL SCN: ABNORMAL
CHLORIDE SERPL-SCNC: 100 MMOL/L (ref 98–107)
COLOR UR AUTO: YELLOW
CREAT SERPL-MCNC: 0.69 MG/DL (ref 0.51–0.95)
DEPRECATED HCO3 PLAS-SCNC: 25 MMOL/L (ref 22–29)
EGFRCR SERPLBLD CKD-EPI 2021: >90 ML/MIN/1.73M2
EOSINOPHIL # BLD AUTO: 0.1 10E3/UL (ref 0–0.7)
EOSINOPHIL NFR BLD AUTO: 2 %
ERYTHROCYTE [DISTWIDTH] IN BLOOD BY AUTOMATED COUNT: 11.5 % (ref 10–15)
ETHANOL SERPL-MCNC: <0.01 G/DL
FENTANYL UR QL: ABNORMAL
FLUAV RNA SPEC QL NAA+PROBE: NEGATIVE
FLUBV RNA RESP QL NAA+PROBE: NEGATIVE
GLUCOSE SERPL-MCNC: 95 MG/DL (ref 70–99)
GLUCOSE UR STRIP-MCNC: NEGATIVE MG/DL
HCG UR QL: NEGATIVE
HCT VFR BLD AUTO: 39.2 % (ref 35–47)
HGB BLD-MCNC: 13.3 G/DL (ref 11.7–15.7)
HGB UR QL STRIP: ABNORMAL
HOLD SPECIMEN: NORMAL
IMM GRANULOCYTES # BLD: 0 10E3/UL
IMM GRANULOCYTES NFR BLD: 0 %
KETONES UR STRIP-MCNC: 60 MG/DL
LEUKOCYTE ESTERASE UR QL STRIP: NEGATIVE
LYMPHOCYTES # BLD AUTO: 2.2 10E3/UL (ref 0.8–5.3)
LYMPHOCYTES NFR BLD AUTO: 32 %
MAGNESIUM SERPL-MCNC: 2.3 MG/DL (ref 1.7–2.3)
MCH RBC QN AUTO: 29.6 PG (ref 26.5–33)
MCHC RBC AUTO-ENTMCNC: 33.9 G/DL (ref 31.5–36.5)
MCV RBC AUTO: 87 FL (ref 78–100)
MONOCYTES # BLD AUTO: 0.4 10E3/UL (ref 0–1.3)
MONOCYTES NFR BLD AUTO: 6 %
MUCOUS THREADS #/AREA URNS LPF: PRESENT /LPF
NEUTROPHILS # BLD AUTO: 4.1 10E3/UL (ref 1.6–8.3)
NEUTROPHILS NFR BLD AUTO: 60 %
NITRATE UR QL: NEGATIVE
NRBC # BLD AUTO: 0 10E3/UL
NRBC BLD AUTO-RTO: 0 /100
OPIATES UR QL SCN: ABNORMAL
PCP QUAL URINE (ROCHE): ABNORMAL
PH UR STRIP: 6.5 [PH] (ref 5–9)
PLATELET # BLD AUTO: 287 10E3/UL (ref 150–450)
POTASSIUM SERPL-SCNC: 3.9 MMOL/L (ref 3.4–5.3)
PROT SERPL-MCNC: 7.9 G/DL (ref 6.4–8.3)
RBC # BLD AUTO: 4.49 10E6/UL (ref 3.8–5.2)
RBC URINE: 81 /HPF
RSV RNA SPEC NAA+PROBE: NEGATIVE
SARS-COV-2 RNA RESP QL NAA+PROBE: NEGATIVE
SODIUM SERPL-SCNC: 139 MMOL/L (ref 135–145)
SP GR UR STRIP: 1.02 (ref 1–1.03)
SQUAMOUS EPITHELIAL: 2 /HPF
TSH SERPL DL<=0.005 MIU/L-ACNC: 1.95 UIU/ML (ref 0.3–4.2)
UROBILINOGEN UR STRIP-MCNC: NORMAL MG/DL
WBC # BLD AUTO: 6.9 10E3/UL (ref 4–11)
WBC URINE: 2 /HPF

## 2023-10-25 PROCEDURE — 99285 EMERGENCY DEPT VISIT HI MDM: CPT | Performed by: PHYSICIAN ASSISTANT

## 2023-10-25 PROCEDURE — 250N000013 HC RX MED GY IP 250 OP 250 PS 637: Performed by: PHYSICIAN ASSISTANT

## 2023-10-25 PROCEDURE — 87637 SARSCOV2&INF A&B&RSV AMP PRB: CPT | Performed by: PHYSICIAN ASSISTANT

## 2023-10-25 PROCEDURE — 83735 ASSAY OF MAGNESIUM: CPT | Performed by: PHYSICIAN ASSISTANT

## 2023-10-25 PROCEDURE — 72040 X-RAY EXAM NECK SPINE 2-3 VW: CPT

## 2023-10-25 PROCEDURE — 82077 ASSAY SPEC XCP UR&BREATH IA: CPT | Performed by: PHYSICIAN ASSISTANT

## 2023-10-25 PROCEDURE — 84443 ASSAY THYROID STIM HORMONE: CPT | Performed by: PHYSICIAN ASSISTANT

## 2023-10-25 PROCEDURE — 36415 COLL VENOUS BLD VENIPUNCTURE: CPT | Performed by: PHYSICIAN ASSISTANT

## 2023-10-25 PROCEDURE — 81025 URINE PREGNANCY TEST: CPT | Performed by: PHYSICIAN ASSISTANT

## 2023-10-25 PROCEDURE — C9803 HOPD COVID-19 SPEC COLLECT: HCPCS | Performed by: PHYSICIAN ASSISTANT

## 2023-10-25 PROCEDURE — 81001 URINALYSIS AUTO W/SCOPE: CPT | Performed by: PHYSICIAN ASSISTANT

## 2023-10-25 PROCEDURE — 85025 COMPLETE CBC W/AUTO DIFF WBC: CPT | Performed by: PHYSICIAN ASSISTANT

## 2023-10-25 PROCEDURE — 80053 COMPREHEN METABOLIC PANEL: CPT | Performed by: PHYSICIAN ASSISTANT

## 2023-10-25 PROCEDURE — 80307 DRUG TEST PRSMV CHEM ANLYZR: CPT | Performed by: PHYSICIAN ASSISTANT

## 2023-10-25 RX ORDER — DIPHENHYDRAMINE HCL 25 MG
25 CAPSULE ORAL ONCE
Status: COMPLETED | OUTPATIENT
Start: 2023-10-25 | End: 2023-10-25

## 2023-10-25 RX ORDER — OLANZAPINE 10 MG/1
10 TABLET ORAL ONCE
Status: COMPLETED | OUTPATIENT
Start: 2023-10-25 | End: 2023-10-25

## 2023-10-25 RX ORDER — IBUPROFEN 200 MG
600 TABLET ORAL EVERY 4 HOURS PRN
Status: DISCONTINUED | OUTPATIENT
Start: 2023-10-25 | End: 2023-10-26 | Stop reason: HOSPADM

## 2023-10-25 RX ORDER — METOCLOPRAMIDE 10 MG/1
10 TABLET ORAL ONCE
Status: COMPLETED | OUTPATIENT
Start: 2023-10-25 | End: 2023-10-25

## 2023-10-25 RX ORDER — NICOTINE 21 MG/24HR
1 PATCH, TRANSDERMAL 24 HOURS TRANSDERMAL DAILY
Status: DISCONTINUED | OUTPATIENT
Start: 2023-10-25 | End: 2023-10-26 | Stop reason: HOSPADM

## 2023-10-25 RX ORDER — GABAPENTIN 300 MG/1
300 CAPSULE ORAL ONCE
Status: COMPLETED | OUTPATIENT
Start: 2023-10-25 | End: 2023-10-25

## 2023-10-25 RX ADMIN — METOCLOPRAMIDE 10 MG: 10 TABLET ORAL at 20:27

## 2023-10-25 RX ADMIN — GABAPENTIN 300 MG: 300 CAPSULE ORAL at 18:12

## 2023-10-25 RX ADMIN — OLANZAPINE 10 MG: 10 TABLET, FILM COATED ORAL at 18:54

## 2023-10-25 RX ADMIN — IBUPROFEN 600 MG: 400 TABLET ORAL at 20:27

## 2023-10-25 RX ADMIN — DIPHENHYDRAMINE HYDROCHLORIDE 25 MG: 25 CAPSULE ORAL at 18:12

## 2023-10-25 RX ADMIN — DIPHENHYDRAMINE HYDROCHLORIDE 25 MG: 25 CAPSULE ORAL at 20:27

## 2023-10-25 RX ADMIN — Medication 1 PATCH: at 18:59

## 2023-10-25 ASSESSMENT — ACTIVITIES OF DAILY LIVING (ADL)
ADLS_ACUITY_SCORE: 35

## 2023-10-25 NOTE — ED TRIAGE NOTES
Patient reports feeling like something is wrong with her neck. She does state that she has been manic for several months and she is concerned that the government or people on the internet are out to harm her. Denies suicidal thoughts or plan.      Triage Assessment (Adult)       Row Name 10/25/23 1116          Triage Assessment    Airway WDL WDL        Respiratory WDL    Respiratory WDL WDL        Skin Circulation/Temperature WDL    Skin Circulation/Temperature WDL WDL        Cardiac WDL    Cardiac WDL WDL        Peripheral/Neurovascular WDL    Peripheral Neurovascular WDL WDL        Cognitive/Neuro/Behavioral WDL    Cognitive/Neuro/Behavioral WDL X;mood/behavior;speech     Level of Consciousness alert     Arousal Level opens eyes spontaneously     Mood/Behavior agitated;anxious        Pupils (CN II)    Pupil PERRLA yes        Welcome Coma Scale    Best Eye Response 4-->(E4) spontaneous     Best Motor Response 6-->(M6) obeys commands     Best Verbal Response 5-->(V5) oriented     Welcome Coma Scale Score 15                      negative...
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
## 2023-10-25 NOTE — TELEPHONE ENCOUNTER
S: [place] , DEC  Chauncey  calling at 3:50PM about a 33 year old/Female presenting with delusions, paranoia, depression     B: Pt arrived via Family. Presenting problem, stressors:Increasing delusions, paranoia, depression and anxiety.  Pt is paranoid people are watching and following her.  Pt thinks the government is following her and put a device in her spine.  Pt is delusional the government is trying to kill her and there is a bomb in her house.  Pt denies AH but reports VH and tactile hallucinations.      Pt affect in ED: Labile, tearful  Pt Dx: ADHD, depression, anxiety, PTSD  Previous IPMH hx? Unknown  Pt denies SI   Hx of suicide attempt? Yes: via overdose as a teenager  Pt has a remote hx of SIB via cutting  Pt denies HI   Pt endorses visual hallucination . Pt is seeing shadow figures and spiders.  Pt endorses tactile hallucinations.  Pt can feel bugs crawling on her skin  Pt RARS Score: 3    Hx of aggression/violence, sexual offenses, legal concerns, Epic care plan? describe: No  Current concerns for aggression this visit? No  Does pt have a history of Civil Commitment? No  Is Pt their own guardian? Yes    Pt is prescribed medication. Is patient medication compliant? No, pt reports not taking meds consistently due to side effects  Pt endorses OP services: Psychiatrist  CD concerns: Actively using/consuming delta 9 and marijuana  Acute or chronic medical concerns: Psoriasis, chronic neck pain.  Medically cleared.  Does Pt present with specific needs, assistive devices, or exclusionary criteria? None      Pt is ambulatory  Pt is able to perform ADLs independently      A: Pt to be reviewed for Community Health admission. Pt is Voluntary  Preferred placement: Statewide +[facility]    COVID Symptoms: No  If yes, COVID test required   Utox: Positive for Amphetamines, Cannabis    CMP: N/A  CBC: N/A  HCG: Negative    R: Patient cleared and ready for behavioral bed placement: Yes  Pt placed on Community Health worklist? Yes    Does  Patient need a Transfer Center request created? Yes, writer completed Transfer Center request at:  4:30PM

## 2023-10-25 NOTE — CONSULTS
10/25/2023  Alba Lee 1990     Writer consulted with ED provider, Lisa ED Nurse,  on this date at  11:20 AM. It was determined that pt would not benefit from assessment at this time due to MD no longer requesting DEC assessment    EC order has been closed at this time.    Zhane Madsen
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
## 2023-10-25 NOTE — ED NOTES
IP MH Referral Acuity Rating Score (RARS)    LMHP complete at referral to IP MH, with DEC; and, daily while awaiting IP MH placement. Call score to PPS.  CRITERIA SCORING   New 71 HH and Involuntary for IP MH (not adolescent) 0/1   Boarding over 24 hours 0/1   Vulnerable adult at least 55+ with multiple co morbidities; or, Patient age 11 or under 0/1   Suicide ideation without relief of precipitating factors 0/1   Current plan for suicide 0/1   Current plan for homicide 0/1   Imminent risk or actual attempt to seriously harm another without relief of factors precipitating the attempt 0/1   Severe dysfunction in daily living (ex: complete neglect for self care, extreme disruption in vegetative function, extreme deterioration in social interactions) 1/1   Recent (last 2 weeks) or current physical aggression in the ED 0/1   Restraints or seclusion episode in ED 0/1   Verbal aggression, agitation, yelling, etc., while in the ED 0/1   Active psychosis with psychomotor agitation or catatonia 1/1   Need for constant or near constant redirection (from leaving, from others, etc).  0/1   Intrusive or disruptive behaviors 1/1   TOTAL Acuity Total Score: 3

## 2023-10-26 ENCOUNTER — TELEPHONE (OUTPATIENT)
Dept: BEHAVIORAL HEALTH | Facility: CLINIC | Age: 33
End: 2023-10-26
Payer: COMMERCIAL

## 2023-10-26 ENCOUNTER — TRANSFERRED RECORDS (OUTPATIENT)
Dept: HEALTH INFORMATION MANAGEMENT | Facility: OTHER | Age: 33
End: 2023-10-26
Payer: COMMERCIAL

## 2023-10-26 VITALS
OXYGEN SATURATION: 99 % | HEART RATE: 90 BPM | SYSTOLIC BLOOD PRESSURE: 135 MMHG | TEMPERATURE: 97.6 F | WEIGHT: 159 LBS | DIASTOLIC BLOOD PRESSURE: 86 MMHG | RESPIRATION RATE: 15 BRPM | BODY MASS INDEX: 29.08 KG/M2

## 2023-10-26 PROBLEM — F29 PSYCHOSIS, UNSPECIFIED PSYCHOSIS TYPE (H): Status: ACTIVE | Noted: 2023-10-26

## 2023-10-26 PROBLEM — F43.9 TRAUMA AND STRESSOR-RELATED DISORDER: Status: ACTIVE | Noted: 2023-10-26

## 2023-10-26 PROCEDURE — 96372 THER/PROPH/DIAG INJ SC/IM: CPT | Performed by: STUDENT IN AN ORGANIZED HEALTH CARE EDUCATION/TRAINING PROGRAM

## 2023-10-26 PROCEDURE — 250N000011 HC RX IP 250 OP 636: Performed by: STUDENT IN AN ORGANIZED HEALTH CARE EDUCATION/TRAINING PROGRAM

## 2023-10-26 PROCEDURE — 250N000013 HC RX MED GY IP 250 OP 250 PS 637: Performed by: PHYSICIAN ASSISTANT

## 2023-10-26 RX ORDER — KETOROLAC TROMETHAMINE 30 MG/ML
30 INJECTION, SOLUTION INTRAMUSCULAR; INTRAVENOUS ONCE
Status: COMPLETED | OUTPATIENT
Start: 2023-10-26 | End: 2023-10-26

## 2023-10-26 RX ORDER — ONDANSETRON 4 MG/1
4 TABLET, ORALLY DISINTEGRATING ORAL ONCE
Status: COMPLETED | OUTPATIENT
Start: 2023-10-26 | End: 2023-10-26

## 2023-10-26 RX ORDER — VILAZODONE HYDROCHLORIDE 10 MG/1
10 TABLET ORAL DAILY
Status: ON HOLD | COMMUNITY
Start: 2023-10-16 | End: 2024-07-22

## 2023-10-26 RX ADMIN — IBUPROFEN 600 MG: 400 TABLET ORAL at 07:43

## 2023-10-26 RX ADMIN — KETOROLAC TROMETHAMINE 30 MG: 30 INJECTION INTRAMUSCULAR; INTRAVENOUS at 11:29

## 2023-10-26 RX ADMIN — Medication 1 PATCH: at 08:06

## 2023-10-26 RX ADMIN — ONDANSETRON 4 MG: 4 TABLET, ORALLY DISINTEGRATING ORAL at 11:29

## 2023-10-26 ASSESSMENT — ACTIVITIES OF DAILY LIVING (ADL)
ADLS_ACUITY_SCORE: 35

## 2023-10-26 NOTE — ED NOTES
Pt given an ice pack and sat and talked with pt. She is very anxious, she was more calm when nurse left room

## 2023-10-26 NOTE — TELEPHONE ENCOUNTER
R: [place]  Access Inpatient Bed Call Log 10/26/23 @ 1:00am   Intake has called facilities that have not updated their bed status within the last 12 hours.??     *METRO:  [place] -- [facility]: @ cap per website.  [place] -- [facility]:  @ cap per website.  [place] -- [facility]: @ cap per website.  [place] -- [facility]: TEMPORARILY CLOSED DUE TO COVID EXPOSURE.  [place] -- [facility]: @ cap per website.  [facility] -- [facility]: @ cap per website.  [facility]/ beds - POSTING 1 BED. Ages 18-25, Voluntary only, COVID test req'd, NO aggression, physical or sexual assault, violence hx or drug abuse  [place] -- [facility]: @ cap per website.  [place] -- [facility]: @ cap per website.  [place] -- [facility]: @ cap per website.    *STATEWIDE (by distance):  [facility]: POSTING 2 BEDS. Mixed unit/Low acuity only.   [facility]: @ cap per website. Low acuity, No aggression  [facility]: @ cap per website.   [facility]: POSTING 1 BED. Low acuity only. No current aggression.  [facility]:  @ cap per website. COVID negative test req. Lower acuity only  [facility]: @ cap per website. Low acuity only. Prefer med adjustments placement.  [facility]: @ cap per website.  No aggression  [place] -- [facility]: POSTING 2 BEDS. NO reviews after 10PM. Low acuity only.  [place] -- [facility]: POSTING 2 BEDS. No hx of aggression. No sexual offenders. Voluntary patients only.  [place] -- [facility]: POSTING 5 BEDS. Low acuity only. Must have the cognitive ability to do programming. No aggressive or violent behavior or recent HX in the last 2 yrs. MH must be primary.   [place] [facility]: POSTING 1 BED.  COVID negative test. Must be low acuity ONLY.  [place] -- [facility]: POSTING 2 BEDS. Low acuity. Negative Covid.   [place] -- [facility]: POSTING 3 BEDS. Covid neg. Voluntary only. Mixed unit  of adults & adol. No aggressive or violent behavior. No registered sex offenders.   [place] -- [facility]: POSTING 1 BED. COVID negative test. PER RN, FULL FOR HIGH ACUITY.  [facility]: @ [facility] per website. No hx of aggression/assault. No lines, drains or tubes. Does not provide detox or CD treatment.   [place] -- [facility]: POSTING 10 BEDS. Out-of-State Facility.  [place] -- [facility]: POSTING 3 BEDS. Mixed unit/Low acuity/no medical devices - IV, CPAP etc.      Pt remains on waitlist pending appropriate placement availability

## 2023-10-26 NOTE — ED PROVIDER NOTES
Transfer of care from previous shift provider:. Shira with hallucinations. Needs med rec. Awaiting IP placement.    ED Course as of 10/26/23 1500   Wed Oct 25, 2023   2325 Sign out at shift change. Manic, disorganized at home, hallucinations/delusions. Off psych meds per mom. Plan for inpatient mental health treatment.    Thu Oct 26, 2023   1046 Patient complaining of tender left posterior occipital lymph node with ice cold feeling over her left side of her face and burning sensation over her right side of her face.  It is quite uncomfortable.  She also has nausea with it.  She is willing to try Toradol which is worked well for her in the past along with Zofran.  Both of these were ordered.     Assessment and Plan:  Final diagnoses:   Psoriasis   Neck muscle spasm   DDD (degenerative disc disease), cervical   Cervical osteoarthritis   Hallucinations   Shira (H)      Disposition: [facility], Charles, MD  10/26/23 4253

## 2023-10-26 NOTE — ED NOTES
"Pt home meds brought in by mom yesterday, called mom to pick them up and mom said \"throw the fucking meds away, just throw them away\". Staff called pharmacy at this time to dispose of medication properly.   "

## 2023-10-26 NOTE — PROGRESS NOTES
Alba Lee  October 26, 2023  Plan of Care Hand-off Note     Patient Care Path: inpatient mental health    Plan for Care:   Patient presented to the ED initially on 10/25/2023 with visual hallcinations, delusions, anxiety, paranoia and other symptoms of psychosis. Patient is no longer endorsing VH but displayed pressured speech, labile mood and affect, psychomotor agitation, decreased appetite, other symptoms of psychosis and symptoms of a trauma and stressor related disorder (night terrors, flashbacks, sleep difficulties due to fear of someone doing something to her in her sleep and distress around reported traumatic events). Patient has been inconsistent in taking her mental health medications and struggles with ongoing substance use. Patient presents as decompensated and displays difficulty caring for herself and maintaining a safe, clean home environment. Patient appears to be exhibiting symptoms of acute psychosis and would benefit from an inpatient mental health admission for safety, stabilization and medication management. Patient is voluntary for an admission.    Contact(s):  Narda Morrison - Mother  Phone: [phone]    Legal Status: Voluntary/Patient has signed consent for treatment      Updated regarding plan of care:  Attending & RN         WILLIAM Shelton

## 2023-10-26 NOTE — TELEPHONE ENCOUNTER
11:06 AM to Dionte at [facility] willing to review for admission.  12:01 PM Fax Sent to [facility] for review.     2:18 PM Writer received message of acceptance.  PS called intake @ 2:01pm, to accept pt for [facility].   Accepting provider is Dr Kuhn.  Nurse to nurse phone is [phone]  ext 3.     Writer updated  working on case    2:20 PM ED notified.      R: [place] [facility] [place] Call Log 10/26/23 @ 7:12 am   Intake has called facilities that have not updated the bed status within the last 12 hours.  (Anywhere)                   [facility] is posting 0 beds.              [facility] is posting 0 beds. [phone]. 10/26 per call at 7:12 am to Brenda, they are at cap.    [facility] is posting 0 beds. Negative covid required.                  [facility] is posting 0 beds. Negative covid required. [phone]; 10/26 per call at 7:13 am to Marielena, they are at cap.   [facility] is posting 0 bed. [phone]    [facility] is posting 0 beds. [phone].     [facility] is posting 1 bed. Negative covid. [phone]. 10/26 Per call at 7:15 am to David, they have 1 bed.   [facility] is posting 0 beds            [facility]) is posting 0 beds.      [facility] is posting 3 beds. LOW acuity ONLY. Mixed unit 12+. Negative covid- [phone].      [facility] has 0 beds posted. No aggression. Negative Covid. Low acuity.       [facility] is posting 0 beds. Negative covid. [phone] 10/26 per call at 7:18 am, vm said NOT to leave a message; no option to speak with a live rep.    [facility] ([place]) is posting 0 beds. Low acuity only. Negative covid.  [phone].     [facility] is posting 1 bed. Low acuity. No current aggression.      [facility] ([place]) is posting 0 bed available. Negative covid.  [phone].        [facility] is posting 0 beds. Low acuity. 72 HH hold preferred. Negative covid  required. [phone]. 10/26 Per call at 7:20 am to Celia, they are at cap.    [facility] ([place]) is posting 0 beds. Low acuity only. Negative covid.  [phone].     [facility] [place] is posting 2 beds. Negative covid required. Vol only, No history of aggression, violence, or assault. No sexual offenders. No 72 HH holds. [phone].       [facility] is posting 5 beds. Negative covid required.  (Must have the cognitive ability to do programming. No aggressive or violent behavior or recent HX in the last 2 yrs. MH must be primary.) Always low acuity. [phone]     [facility] has 1 bed posted. Negative covid required.  Low acuity only. Violence and aggression capped.  [phone]. Low acuity only. 10/26 Per call at 7:24 am to Paul, we can call back later.    [facility] is posting 2 beds. Low acuity, Negative covid required. [phone]. 10/26 Per call at 7:26 am to Amy, they are at cap but we can call later again to check.    [facility] is posting 3 beds. Unit is a combined unit (14+). No aggressive patients. Voluntary only. Must be accompanied by a guardian.  Negative covid. [phone]. 10/26 Per call at 7:29 am to Chyna, she will ask the nurse to call us back with bed availability.    [facility] posting 1 bed Negative covid required.  [phone] 10/26 per call at 6 am to Chauncey, they are at cap.    [facility] [place] is posting 0 beds. Negative covid. LOW acuity. (No lines, drains, or tubes, oxygen, CPAP, IV, etc.). [phone]. 10/26 Per call at 7:43 on 10/25 to Miriam currently no Provider so unable to take pts until Friday 10/27.   [facility] is posting 10 beds. No covid test required. [phone]. 10/26 Per call at 7:34 am to [facility], 10 adults and 7 kid beds.    [facility] ([facility] is posting 3 beds. Negative covid. Mixed unit. (No. lines, drains, or tubes,  oxygen, CPAP, IV, etc.). [phone]. 10/26  Per call at 7:36 am to [facility], they have 4 beds.      Patient remains on the work list pending appropriate bed availability.

## 2023-10-26 NOTE — PHARMACY-ADMISSION MEDICATION HISTORY
"Pharmacist Admission Medication History    Admission medication history is complete. The information provided in this note is only as accurate as the sources available at the time of the update.    Information Source(s): Patient via in-person, [employer]    Pertinent Information: Patient states she has not taken her oral contraceptive for the past several days and now has her period.    Changes made to PTA medication list:  Added: Viibryd 10 mg tablets  Deleted: None  Changed: None    Note: patient states she also takes several OTC supplements (potassium, magnesium, Vitamin A, Vitamin C and Vitamin E).  These were not added at this time.    Note: patient also states she was taking \"pot gummies\" and \"anything she could get her hands on\" to try and alleviate her pain     Medication Affordability: not assessed     Allergies reviewed with patient and updates made in EHR: unable to assess - patient very agitated at time of interview    Medication History Completed By: Karla Gaines [facility] 10/26/2023 11:49 AM    PTA Med List   Medication Sig Last Dose    amphetamine-dextroamphetamine (ADDERALL XR) 20 MG 24 hr capsule Take 20 mg by mouth daily Past Week    amphetamine-dextroamphetamine (ADDERALL) 20 MG tablet Take 20 mg by mouth daily as needed (concentration) Past Week    busPIRone (BUSPAR) 10 MG tablet Take 10 mg by mouth 3 times daily Past Week    desogestrel-ethinyl estradiol (APRI) 0.15-30 MG-MCG tablet Take 1 tablet by mouth daily Past Week    tacrolimus (PROTOPIC) 0.1 % external ointment APPLY IN AFFECTED EAR(S) TWICE DAILY ON DAYS NOT USING CUTICORT Unknown    vilazodone (VIIBRYD) 10 MG TABS tablet Take 10 mg by mouth daily Take with 20 mg tablet to equal 30 mg daily     vilazodone (VIIBRYD) 20 MG TABS tablet Take 20 mg by mouth daily Take with 10 mg tablet to equal 30 mg daily Past Week    vitamin D3 (CHOLECALCIFEROL) 50 mcg (2000 units) tablet Take 1 tablet by mouth daily Past Week       "

## 2023-10-26 NOTE — PROGRESS NOTES
:    Patient presented to the ED with mental health concerns.    DEC assessed and recommended inpatient mental health.    Patient was placed on the inpatient mental health waiting list.    Patient is a RARS of 3.  Patient is voluntary and is willing to go anywhere.    DAVID Franco on 10/26/2023 at 9:40 AM
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
## 2023-10-26 NOTE — TELEPHONE ENCOUNTER
R: [place]  Access Inpatient Bed Call Log 10/25/23 10:30 PM   Intake has called facilities that have not updated the bed status within the last 12 hours.                                 [facility] is at capacity.               [facility] is posting 0 beds. [phone].     [facility] is posting 0 beds. Negative covid required.   Per Krystle at 10:35PM, at capacity.                 [facility] is posting 0 beds. Negative covid required. [phone] Per call at 8:30PM, per Miranda, no beds available.   [facility] is posting 0 bed. [phone]  Per Krystle at 10:35PM, at capacity.                 [facility] is posting 0 beds. [phone].     [facility] is posting 0 bed. Negative covid. [phone].    [facility] is posting 0 beds    Per Krystle at 10:35PM, at capacity.                       [facility] ([facility]) is posting 0 beds.  Per Krystle at 10:35PM, at capacity.                      [facility] is posting 0 beds. LOW acuity ONLY. Mixed unit 12+. Negative covid- [phone].      [facility] has 0 beds posted. No aggression. Negative Covid. Low acuity.     Per Krystle at 10:35PM, at capacity.                 [facility] is posting 0 beds. Negative covid. [phone]    [facility] is posting 0 beds. Low acuity only. Negative covid.  [phone].     [facility] is posting 1 beds. Low acuity. No current aggression.    Per Krystle at 10:35PM, at capacity.                 [facility] ([place]) is posting 0 bed available. Negative covid.  [phone].        [facility] is posting 0 beds. Low acuity. 72 HH hold preferred. Negative covid required. [phone].  Per call at 5:22 pm to Yogi no beds available.   [facility] ([place]) is posting 1 bed. Low acuity only. Negative covid.  [phone].   Per staff 10:50PM, at capacity  [facility] is posting 4 beds.  Negative covid required. Vol only, No history of aggression, violence, or assault. No sexual offenders. No 72 HH holds. [phone].  Per call, unable to review at this time.  They requested we try back later       [facility] is posting 6 beds. Negative covid required.  (Must have the cognitive ability to do programming. No aggressive or violent behavior or recent HX in the last 2 yrs. MH must be primary.) Always low acuity. [phone]     [facility] has 2 beds posted. Negative covid required.  Low acuity only. Violence and aggression capped.  [phone]. Low acuity only.  Per Kendal at 10:35PM, on divert.  [facility] is posting 2 bed. Low acuity, Negative covid required. [phone]. Per call at 7:41 am to Bryanna currently no beds.   [facility] is posting 3 beds. Unit is a combined unit (14+). No aggressive patients. Voluntary only. Must be accompanied by a guardian.  Negative covid. [phone].   [facility] posting 1 bed Negative covid required.  [phone]     [facility] [place] is posting 0 bed. Negative covid. LOW acuity. (No lines, drains, or tubes, oxygen, CPAP, IV, etc.). [phone]. Per call at 7:43 Miriam currently no Provider unable to take pts until Friday 10/27.   [facility] is posting 10 beds. No covid test required. [phone].    [facility] ([facility]) is posting 3 beds. Negative covid. (No. lines, drains, or tubes, oxygen, CPAP, IV, etc.). [phone]. Per call at 7:47 am to Stefanie case by case basis. Facility is agreeable to review pt for placement.  Awaiting call back.       Pt remains on the work list pending appropriate bed availability.          11:00PM [facility] is currently reviewing pt.  Awaiting update.

## 2023-10-26 NOTE — ED NOTES
Pt requesting something for head and neck pain. States the zyprexa helped with some of her anxiety

## 2023-10-26 NOTE — CONSULTS
DEC Consult Order placed. DEC assessment completed by Chauncey Cole [facility], on 10/25/23 at 2:36 PM. Consult acknowledged and completed.     Isha Mendiola

## 2023-10-26 NOTE — TELEPHONE ENCOUNTER
R:  [facility] called intake @ 2:01pm, to accept pt for [facility].   Accepting provider is Dr Kuhn.  Nurse to nurse phone is [phone]  ext 3.     Writer updated  working on case

## 2024-05-26 ENCOUNTER — HEALTH MAINTENANCE LETTER (OUTPATIENT)
Age: 34
End: 2024-05-26

## 2024-07-16 ENCOUNTER — MEDICAL CORRESPONDENCE (OUTPATIENT)
Dept: HEALTH INFORMATION MANAGEMENT | Facility: OTHER | Age: 34
End: 2024-07-16
Payer: COMMERCIAL

## 2024-07-20 ENCOUNTER — TRANSFERRED RECORDS (OUTPATIENT)
Dept: HEALTH INFORMATION MANAGEMENT | Facility: CLINIC | Age: 34
End: 2024-07-20
Payer: COMMERCIAL

## 2024-07-20 ENCOUNTER — HOSPITAL ENCOUNTER (INPATIENT)
Facility: HOSPITAL | Age: 34
LOS: 4 days | Discharge: HOME | End: 2024-07-24
Attending: PSYCHIATRY & NEUROLOGY | Admitting: STUDENT IN AN ORGANIZED HEALTH CARE EDUCATION/TRAINING PROGRAM
Payer: COMMERCIAL

## 2024-07-20 ENCOUNTER — TELEPHONE (OUTPATIENT)
Dept: BEHAVIORAL HEALTH | Facility: HOSPITAL | Age: 34
End: 2024-07-20

## 2024-07-20 ENCOUNTER — TELEPHONE (OUTPATIENT)
Dept: BEHAVIORAL HEALTH | Facility: CLINIC | Age: 34
End: 2024-07-20
Payer: COMMERCIAL

## 2024-07-20 DIAGNOSIS — F33.2 SEVERE RECURRENT MAJOR DEPRESSION WITHOUT PSYCHOTIC FEATURES (H): Primary | ICD-10-CM

## 2024-07-20 PROBLEM — T14.91XA SUICIDAL BEHAVIOR WITH ATTEMPTED SELF-INJURY (H): Status: ACTIVE | Noted: 2024-07-20

## 2024-07-20 PROCEDURE — 250N000013 HC RX MED GY IP 250 OP 250 PS 637: Performed by: NURSE PRACTITIONER

## 2024-07-20 PROCEDURE — 124N000001 HC R&B MH

## 2024-07-20 RX ORDER — LANOLIN ALCOHOL/MO/W.PET/CERES
3 CREAM (GRAM) TOPICAL
Status: DISCONTINUED | OUTPATIENT
Start: 2024-07-20 | End: 2024-07-24 | Stop reason: HOSPADM

## 2024-07-20 RX ORDER — OLANZAPINE 10 MG/1
10 TABLET ORAL 3 TIMES DAILY PRN
Status: DISCONTINUED | OUTPATIENT
Start: 2024-07-20 | End: 2024-07-24 | Stop reason: HOSPADM

## 2024-07-20 RX ORDER — ACETAMINOPHEN 325 MG/1
650 TABLET ORAL EVERY 4 HOURS PRN
Status: DISCONTINUED | OUTPATIENT
Start: 2024-07-20 | End: 2024-07-24 | Stop reason: HOSPADM

## 2024-07-20 RX ORDER — NICOTINE 21 MG/24HR
1 PATCH, TRANSDERMAL 24 HOURS TRANSDERMAL DAILY
Status: DISCONTINUED | OUTPATIENT
Start: 2024-07-20 | End: 2024-07-24 | Stop reason: HOSPADM

## 2024-07-20 RX ORDER — OLANZAPINE 10 MG/2ML
10 INJECTION, POWDER, FOR SOLUTION INTRAMUSCULAR 3 TIMES DAILY PRN
Status: DISCONTINUED | OUTPATIENT
Start: 2024-07-20 | End: 2024-07-24 | Stop reason: HOSPADM

## 2024-07-20 RX ORDER — GABAPENTIN 100 MG/1
100 CAPSULE ORAL EVERY 6 HOURS PRN
Status: DISCONTINUED | OUTPATIENT
Start: 2024-07-20 | End: 2024-07-24 | Stop reason: HOSPADM

## 2024-07-20 RX ADMIN — GABAPENTIN 100 MG: 100 CAPSULE ORAL at 17:44

## 2024-07-20 RX ADMIN — OLANZAPINE 10 MG: 10 TABLET, FILM COATED ORAL at 18:05

## 2024-07-20 RX ADMIN — ACETAMINOPHEN 650 MG: 325 TABLET, FILM COATED ORAL at 17:53

## 2024-07-20 ASSESSMENT — ACTIVITIES OF DAILY LIVING (ADL)
ADLS_ACUITY_SCORE: 31
ADLS_ACUITY_SCORE: 45
ADLS_ACUITY_SCORE: 31

## 2024-07-20 NOTE — TELEPHONE ENCOUNTER
2:57 PM: Intake received a call from Philip informing that they gave an admission coming from [place] and going to [address] [place] [address].     3:07 PM: Pt placed in queue and added to [facility] admit board. Isabella medel.

## 2024-07-20 NOTE — PLAN OF CARE
"ADMISSION NOTE:    Reason for admission: SI.  Safety concerns: No.  Risk for or history of violence: No.   Full skin assessment: WNL.    Patient arrived on unit from [facility] accompanied by EMS and security on 07/20/24 @1706.    Status on arrival: Patient appears depressed, tearful, flat affect, with poor eye contact. Denies HI/AVH/pain. Endorses passive SI thoughts, depression, and anxiety. \"I'm so tired of trying. Nothing gets better. I don't know what to do.\" Writer and UA talked with patient for fifteen minutes and encouraged her to change into scrubs. Pt has difficulty making any decisions. Pt became less tearful, showered, and picked at her dinner. C/O headache pain and high anxiety. PRN Tylenol given with good effect. PRN gabapentin given with little effect. PRN Zyprexa given with good effect. Pt slept from 1900 through end of evening shift.    Report given to night RN at shift change.    /95 (BP Location: Right arm, Patient Position: Sitting, Cuff Size: Adult Regular)   Pulse 94   Temp 96.9  F (36.1  C) (Temporal)   Resp 20   SpO2 99%     Patient given tour of unit and Welcome to  unit papers given to patient, wanding completed, belongings inventoried, and admission assessment completed.     Patient's legal status on arrival is: 72 Hour Hold.     Appropriate legal rights discussed with and copy given to patient. Patient Bill of Rights discussed with and copy given to patient.     Patient denies SI, HI, and thoughts of self harm and contracts for safety while on unit.      Issac Villalobos RN  7/20/2024  6:13 PM          "

## 2024-07-20 NOTE — TELEPHONE ENCOUNTER
S: Outside Facility [facility] , calling at 1415.  33 year old/Female presenting with suicidal ideations with plan to jump off a bridge    B: Pt arrived via police. Pt presents with suicidal ideations with plan to jump off a bridge.  Pt affect in ED: Calm, cooperative, tearful  Pt Dx: Major Depressive Disorder, Anxiety Disorder, and ADHD  Previous IPMH hx? Yes  Pt endorses SI. Pt denies SIB.   Pt denies HI. Pt denies hallucinations.   Hx of suicide attempt? Yes  Hx of aggression, or current concerns for aggression this visit? No  Pt is prescribed medication. Pt is not medication compliant  Pt denies OP services.  CD concerns: No- previously  Acute medical concerns: No  Does Pt present with any of the following: assistive devices, insulin pump, J/G tube, catheter, CPAP, continuous IV, continuous O2, bariatric needs, ADA needs? No  Is Pt their own guardian? Yes  Pt is ambulatory  Pt is  able to perform ADLs independently    A: Pt meets criteria for review for IP admission. Patient on a 72-hour hold.   COVID:  No s/s   Utox: Intake requested lab  CMP: WNL  CBC: WNL  HCG: Negative    R: Patient accepted for behavioral bed placement: Yes        Accepted by Provider Sabine Red    Admission to Inpatient Level of Care is indicated due to:     Patient risk of severity of behavioral health disorder is appropriate to proposed level of care as indicated by:   Imminent risk of harm to self Yes describe: active plan, not taking medications  Imminent risk of harm to others No describe:   And/or  Behavioral health disorder is present and appropriate for inpatient care with both of the following:   a.  Severe psychiatric, behavioral or other comorbid conditions: Yes describe:   b.  Severe dysfunction in daily living is present: Yes describe: sleeping all day, poor hygiene  2.  Inpatient mental health services are necessary to meet patient needs based on:   a. Specific condition related to admission diagnosis is present  and will likely improve with treatment at an inpatient level of care: Yes  b. Specific condition related to admission diagnosis will likely deteriorate in the absence of treatment at an inpatient level of care: Yes    3.  Situation and expectations are appropriate for inpatient care, as indicated by  one of the following:     A. Patient is unwilling to participate in treatment voluntarily and requires treatment. Yes   B. Is voluntary treatment at lower level of care feasible No  C. Around the clock medical and nursing care is for symptoms is required: Yes  D. Patient management at lower level of care is not appropriate and  biopsychosocial stressors may be contributing to clinical presentation. Yes

## 2024-07-21 PROCEDURE — 124N000001 HC R&B MH

## 2024-07-21 PROCEDURE — 250N000013 HC RX MED GY IP 250 OP 250 PS 637: Performed by: NURSE PRACTITIONER

## 2024-07-21 PROCEDURE — 99223 1ST HOSP IP/OBS HIGH 75: CPT | Mod: AI | Performed by: PSYCHIATRY & NEUROLOGY

## 2024-07-21 RX ORDER — DESVENLAFAXINE 25 MG/1
25 TABLET, EXTENDED RELEASE ORAL DAILY
Status: DISCONTINUED | OUTPATIENT
Start: 2024-07-21 | End: 2024-07-24 | Stop reason: HOSPADM

## 2024-07-21 RX ADMIN — OLANZAPINE 10 MG: 10 TABLET, FILM COATED ORAL at 16:30

## 2024-07-21 ASSESSMENT — ACTIVITIES OF DAILY LIVING (ADL)
ADLS_ACUITY_SCORE: 31
LAUNDRY: UNABLE TO COMPLETE
DRESS: INDEPENDENT
ORAL_HYGIENE: INDEPENDENT
ADLS_ACUITY_SCORE: 31
HYGIENE/GROOMING: INDEPENDENT
ADLS_ACUITY_SCORE: 31

## 2024-07-21 NOTE — PLAN OF CARE
Face to face end of shift report will be communicated to oncoming RN.    Problem: Adult Behavioral Health Plan of Care  Goal: Patient-Specific Goal (Individualization)  Description: Patient will sleep 6 to 8 hours per night  Patient will eat at least 50% of meals  Patient will attend at least 50% of groups  Patient will comply with recommendations of treatment team  Patient will remain medication compliant    7/20/2024-patient placed in MHICU due to unpredictable behaviors.   Outcome: Progressing     Problem: Suicide Risk  Goal: Absence of Self-Harm  Description: Patient will deny SI by discharge.  Patient will remain free from self harm/injury during hospitalization.  Outcome: Progressing  Face to face end of shift report obtained from BETH Davenport. Pt observed resting in bed.  Pt appears to be sleeping in bed with eyes closed. 15 minutes and PRN safety checks completed with no noted complains. No self harm noted or reported so far this shift.   0600-Pt appeared to had slept all night.

## 2024-07-21 NOTE — PROGRESS NOTES
07/20/24 9930   Patient Belongings   Did you bring any home meds/supplements to the hospital?  No   Patient Belongings locker;sent to security per site process   Patient Belongings Put in Hospital Secure Location (Security or Locker, etc.) other (see comments)  (Bra, Socks, Boots, Shirt, Pants, Vape)   Belongings Search Yes   Clothing Search Yes   Second Staff Mariana TREJO     List items sent to safe: Smartphone w/ no cracks  All other belongings put in assigned cubby in belongings room.     I have reviewed my belongings list on admission and verify that it is correct.     Patient signature_______________________________    Second staff witness (if patient unable to sign) ______________________________       I have received all my belongings at discharge.    Patient signature________________________________    AYSE  7/20/2024  7:06 PM

## 2024-07-21 NOTE — PROGRESS NOTES
List items sent to safe: Smartphone w/ no cracks  All other belongings put in assigned cubby in belongings room.     I have reviewed my belongings list on admission and verify that it is correct.     Patient signature_______________________________    Second staff witness (if patient unable to sign) ______________________________       I have received all my belongings at discharge.    Patient signature________________________________    AYSE  7/20/2024  7:00 PM
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
## 2024-07-21 NOTE — PLAN OF CARE
Face to face end of shift report received from Eileen DE DIOS RN. Rounding completed. Patient observed in bed, appears asleep, respirations observed.     Pt has remained in her room, in bed for the entire shift. She does not make eye contact with staff and she is dismissive. She refuses to participate in nursing assessment. Pt has not come  out of her room to get her meal trays. Staff continues to encourage pt to participate in the unit's milieu. Frequent rounding.     Problem: Adult Behavioral Health Plan of Care  Goal: Patient-Specific Goal (Individualization)  Description: Patient will sleep 6 to 8 hours per night  Patient will eat at least 50% of meals  Patient will attend at least 50% of groups  Patient will comply with recommendations of treatment team  Patient will remain medication compliant    7/21/2024- Patient placed in MHICU due to unpredictable behaviors.   Outcome: Progressing     Problem: Suicide Risk  Goal: Absence of Self-Harm  Description: Patient will deny SI by discharge.  Patient will remain free from self harm/injury during hospitalization.  Outcome: Progressing     Diana Hayden RN  7/21/2024  7:45 AM

## 2024-07-22 PROCEDURE — 99232 SBSQ HOSP IP/OBS MODERATE 35: CPT | Mod: 95 | Performed by: PSYCHIATRY & NEUROLOGY

## 2024-07-22 PROCEDURE — 124N000001 HC R&B MH

## 2024-07-22 PROCEDURE — 250N000013 HC RX MED GY IP 250 OP 250 PS 637: Performed by: PSYCHIATRY & NEUROLOGY

## 2024-07-22 RX ORDER — DEXTROAMPHETAMINE SACCHARATE, AMPHETAMINE ASPARTATE, DEXTROAMPHETAMINE SULFATE AND AMPHETAMINE SULFATE 7.5; 7.5; 7.5; 7.5 MG/1; MG/1; MG/1; MG/1
30 TABLET ORAL DAILY
Status: ON HOLD | COMMUNITY
Start: 2024-07-18 | End: 2024-07-23

## 2024-07-22 RX ORDER — IBUPROFEN 200 MG
400 TABLET ORAL PRN
COMMUNITY

## 2024-07-22 RX ORDER — DEXTROAMPHETAMINE SACCHARATE, AMPHETAMINE ASPARTATE MONOHYDRATE, DEXTROAMPHETAMINE SULFATE AND AMPHETAMINE SULFATE 7.5; 7.5; 7.5; 7.5 MG/1; MG/1; MG/1; MG/1
30 CAPSULE, EXTENDED RELEASE ORAL EVERY MORNING
Status: ON HOLD | COMMUNITY
Start: 2024-06-21 | End: 2024-07-23

## 2024-07-22 RX ORDER — PROPRANOLOL HYDROCHLORIDE 10 MG/1
10-30 TABLET ORAL
Status: ON HOLD | COMMUNITY
Start: 2024-07-17 | End: 2024-07-23

## 2024-07-22 RX ORDER — DESVENLAFAXINE 50 MG/1
1 TABLET, FILM COATED, EXTENDED RELEASE ORAL DAILY
Status: ON HOLD | COMMUNITY
Start: 2024-07-18 | End: 2024-07-23

## 2024-07-22 RX ADMIN — DESVENLAFAXINE SUCCINATE 25 MG: 25 TABLET, EXTENDED RELEASE ORAL at 08:39

## 2024-07-22 ASSESSMENT — ACTIVITIES OF DAILY LIVING (ADL)
ADLS_ACUITY_SCORE: 31
HYGIENE/GROOMING: INDEPENDENT
ADLS_ACUITY_SCORE: 31
ORAL_HYGIENE: INDEPENDENT
ADLS_ACUITY_SCORE: 31
ADLS_ACUITY_SCORE: 31
DRESS: INDEPENDENT
ADLS_ACUITY_SCORE: 31
DRESS: INDEPENDENT
ADLS_ACUITY_SCORE: 31
HYGIENE/GROOMING: INDEPENDENT
ADLS_ACUITY_SCORE: 31
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
## 2024-07-22 NOTE — MEDICATION SCRIBE - ADMISSION MEDICATION HISTORY
"Medication Scribe Admission Medication History    Admission medication history is complete. The information provided in this note is only as accurate as the sources available at the time of the update.    Information Source(s): Patient, CareEveryteresa/[facility]Sc[facility], and [facility]  via in-person and phone    [facility] - Aicha Escobar  Last Seen: 7/16/24  Active Meds:  Adderall 30 mg tab - 1 tab daily (6-8 hours after Adderall XR dose)  Adderall XR 30 mg - 1 cap PO q am  Propranolol 10 mg - 1 to 3 tabs up to twice daily as needed for anxiety (4-6 hours between doses; monitor for signs & symptoms of hypotension  Desvenlafaxine Succinate ER 50 mg - 1 tab daily    Pertinent Information:   Patient manages her own medications at home and is a good historian. Per patient, her mom does not want her taking medications so she \"is not sure what to do.\" Patient reports she picked up her Pristiq but has not started it PTA. Pharmacy confirms medication was picked up.     Changes made to PTA medication list:  Added: Melatonin, IBU, APAP, Propranolol, Pristiq  Deleted: Vit D (not taking), Tacrolimus (not taking; no recent fill hx), Viibryd & Buspar (discontinued by Aicha Escobar)  Changed: Updated  Adderall XR from 20 mg daily to 30 mg daily  Adderall IR from 20 mg daily to 30 mg daily (6-8 hours after Adderall XR)    Allergies reviewed with patient and updates made in EHR: yes    Medication History Completed By: Eduarda Casey 7/22/2024 10:25 AM    PTA Med List   Medication Sig Note Last Dose    ACETAMINOPHEN PO Take 1-2 tablets by mouth as needed (headaches)  prn at prn    amphetamine-dextroamphetamine (ADDERALL XR) 30 MG 24 hr capsule Take 30 mg by mouth every morning  7/18/2024 at 0500    amphetamine-dextroamphetamine (ADDERALL) 30 MG tablet Take 30 mg by mouth daily . Take 6-8 hours after Adderall XR dose.  7/18/2024 at 1200    desogestrel-ethinyl estradiol (APRI) 0.15-30 MG-MCG tablet Take 1 " tablet by mouth daily  Past Month    desvenlafaxine (PRISTIQ) 50 MG 24 hr tablet Take 1 tablet by mouth daily 7/22/2024: *NEW* --- Not started PTA     ibuprofen (ADVIL/MOTRIN) 200 MG tablet Take 400 mg by mouth as needed (headaches)  prn at prn    melatonin 5 MG tablet Take 5 mg by mouth nightly as needed for sleep  7/18/2024 at 2100    propranolol (INDERAL) 10 MG tablet Take 10-30 mg by mouth up to twice daily as needed for anxiety (4-6 hours between doses). Monitor for signs and symptoms of hypotension.  Past Week at prn         [facility] - Aicha Escobar  Last Seen: 7/16/24  Active Meds:  Adderall 30 mg tab - 1 tab daily (6-8 hours after Adderall XR dose)  Adderall XR 30 mg - 1 cap PO q am  Propranolol 10 mg - 1 to 3 tabs up to twice daily as needed for anxiety (4-6 hours between doses; monitor for signs & symptoms of hypotension  Desvenlafaxine Succinate ER 50 mg - 1 tab daily

## 2024-07-22 NOTE — PROGRESS NOTES
CLINICAL NUTRITION SERVICES  -  ASSESSMENT NOTE    REASON FOR ASSESSMENT:  Admission Nutrition Risk Screen - unsure of weight loss      NUTRITION HISTORY  Alba Lee is a 33 year old female admitted for SI. Hx includes depression, anxiety, ADHD. Last weight documented was 9 months ago. No current weight to assess changes. Poor intake so far this admission.    Allergies     Allergies   Allergen Reactions    Penicillins Hives    Hydroxyzine Rash     No rash with benadryl       CURRENT NUTRITION ORDERS  Diet Order:   Orders Placed This Encounter      Regular Diet Adult  Current Intake/Tolerance: 0%, 0%, 100%, 0%    ANTHROPOMETRICS  Height: Data Unavailable  Weight: 0 lbs 0 oz  There is no height or weight on file to calculate BMI.  Weight Status:  no current weight to determine  Weight History:   Wt Readings from Last 10 Encounters:   10/25/23 72.1 kg (159 lb)   09/23/23 72.1 kg (159 lb)   06/05/23 83 kg (183 lb)   07/15/22 83 kg (183 lb)   07/05/22 79.9 kg (176 lb 3.2 oz)   02/14/22 79 kg (174 lb 3.2 oz)   10/19/21 78.5 kg (173 lb)   08/12/21 77.6 kg (171 lb)   08/03/21 75.8 kg (167 lb)   05/13/21 73.5 kg (162 lb)        LABS  Labs reviewed    MEDICATIONS  Medications reviewed    Malnutrition Diagnosis: Unable to determine. Need current weight. Expect nutrition status to decline with inadequate intake     NUTRITION INTERVENTIONS  Recommendations / Nutrition Prescription  Encourage intake during meal times  Obtain current measured weight    MONITORING AND EVALUATION:  Intake, weight, labs

## 2024-07-22 NOTE — PLAN OF CARE
"Face to face end of shift report received from day RN. Rounding completed. Patient observed in her room resting in bed.     Issac Villalobos, RN  7/21/2024  1530    Patient spent most of shift resting/sleeping in her room. Denies HI/AVH/anxiety/pain. Endorses depression and passive SI, no intent/plan. Tearful and irritable. Declines going to groups. \"What so I can learn coping skills and how to breath? I need to back home and get therapy. I don't know if I should start taking the new medication or not. My mom doesn't want me on anything.\" Pt called mom and signed JR for her. Pt continues to have circular negative thoughts and passive SI. Requested that provider call her mom tomorrow and discuss the treatment plan and medications. Accepted PRN Zyprexa with good effect. Appetite poor, didn't eat dinner or HS snack. Hygiene good.    Report will be given to night RN at shift change.     Problem: Adult Behavioral Health Plan of Care  Goal: Patient-Specific Goal (Individualization)  Description: Patient will sleep 6 to 8 hours per night  Patient will eat at least 50% of meals  Patient will attend at least 50% of groups  Patient will comply with recommendations of treatment team  Patient will remain medication compliant    7/20/2024-patient placed in MHICU due to unpredictable behaviors.   Outcome: Progressing     Problem: Suicide Risk  Goal: Absence of Self-Harm  Description: Patient will deny SI by discharge.  Patient will remain free from self harm/injury during hospitalization.  Outcome: Progressing     "

## 2024-07-22 NOTE — PROGRESS NOTES
"  [facility] PSYCHIATRY  -  PROGRESS NOTE     ID   Name: Alba Lee  MRN#: [id number]     SUBJECTIVE   Prior to interviewing the patient, I met with nursing and reviewed patient's clinical condition. We discussed clinical care both before and after the interview. I have reviewed the patient's clinical course by review of records including previous notes, labs, and vital signs.     Per nursing, the patient had the following behavioral events over the last 24-hours: isolative, did not take pristiq yesterday.     On psychiatric interview, she is met on the open unit.  She states \"I did not do anything but sleep\". When asked why she did not take the pristiq, she states \"nobody todl me about it\".  She states she is not open to attending groups. She states \"all I am going to do is sit there and it is not going to help\".  She then states \"I guess doing nothing is not going to help\".  She states she is \"sad\" and no longer wants to feel this way.  She states \"nothing\" is going to help her.  She is dismissive and several times states \"whatever\". She states she just wants to go home and be with her cats.  She minimizes the severity of her depressive symptoms.        MEDICATIONS   Scheduled Meds:  Current Facility-Administered Medications   Medication Dose Route Frequency Provider Last Rate Last Admin    desvenlafaxine succinate (PRISTIQ) 24 hr tablet 25 mg  25 mg Oral Daily Jace Jain MD        nicotine (NICODERM CQ) 21 MG/24HR 24 hr patch 1 patch  1 patch Transdermal Daily Sabine Red APRN CNP         PRN Meds:.  Current Facility-Administered Medications   Medication Dose Route Frequency Provider Last Rate Last Admin    acetaminophen (TYLENOL) tablet 650 mg  650 mg Oral Q4H PRN Sabine Red APRN CNP   650 mg at 07/20/24 1753    gabapentin (NEURONTIN) capsule 100 mg  100 mg Oral Q6H PRN Sabine Red APRN CNP   100 mg at 07/20/24 1744    melatonin tablet 3 mg  3 mg Oral At Bedtime PRN Teofilo, " "OSCAR Huynh CNP        OLANZapine (zyPREXA) tablet 10 mg  10 mg Oral TID PRN Sabine Red APRN CNP   10 mg at 24 1630    Or    OLANZapine (zyPREXA) injection 10 mg  10 mg Intramuscular TID PRN Sabine Red APRN CNP            ALLERGIES   Allergies   Allergen Reactions    Penicillins Hives    Hydroxyzine Rash     No rash with benadryl        VITALS   Vitals: BP 98/63   Pulse 97   Temp 97.9  F (36.6  C) (Temporal)   Resp 14   SpO2 97%      MENTAL STATUS EXAM   Appearance:  awake, alert, adequately groomed, dressed in hospital scrubs, and appeared as age stated  Attitude:  cooperative  Eye Contact:  good  Mood:  \"sad\"  Affect:  irritable   Speech:  clear, coherent  Psychomotor Behavior:  no evidence of tardive dyskinesia, dystonia, or tics  Thought Process:  logical, linear, and goal oriented  Associations:  no loose associations  Thought Content:  passive suicidal ideation present  Insight:  limited  Judgment:  limited  Oriented to:  time, person, and place  Attention Span and Concentration:  intact  Recent and Remote Memory:  intact  Gait and Station: Normal        LABS   No results found for this or any previous visit (from the past 24 hour(s)).      ASSESSMENT   Alba Lee is a 33 year old female with a past psychiatric history notable for depression, anxiety, and ADHD.  She has prior inpatient psychiatric hospitalizations. Recently admitted at [facility] from 10/23-, placed on MI commitment that  in 2024.  Presents to outside emergency department accompanied by law enforcement for worsening depressive symptoms, increased crying jags and reports of suicidal ideation with plan to jump off a bridge (although is denying this stating her mother said this and not her in order to get hospitalized).  She was placed on an involuntary 72 hour hold. Patient was subsequently transferred and accepted for inpatient psychiatric hospitalization at [facility] " Unit 5 for further safety and further stabilization     Daily Progress: tolerated transition to unit. Isolative to room. Prominent nihilism and help rejection.  Encourage group attendance and having her take an active role in her stabilization     DIAGNOSTIC FORMULATION   #Major Depressive Disorder, Recurrent, Severe without psychotic features  #Suicidal Ideation  #Hx of ADHD     PLAN     Location: Unit 5  Legal Status: Orders Placed This Encounter      Legal status 72 Hour Hold    Safety Assessment:    Behavioral Orders   Procedures    Code 1 - Restrict to Unit    Routine Programming     As clinically indicated    Status 15     Every 15 minutes.          PTA medications held:   -adderall      PTA medications continued/changed:   -none     New medications initiated:   -pristiq     Today's Changes:  -encourage pristiq and group attendance     Programming: Patient will be treated in a therapeutic milieu with appropriate individual and group therapies. Education will be provided on diagnoses, medications, and treatments. Encouraged behavioral activation and participation in group programming.     Medical diagnoses:  Per medicine    Disposition: pending clinical course        TREATMENT TEAM CARE PLAN     Progress: Continued symptoms.    Continued Stay Criteria/Rationale: Continued symptoms without sufficient improvement/resolution.    Medical/Physical: See above.    Precautions: See above.     Plan: Continue inpatient care with unit support and medication management.    Rationale for change in precautions or plan: NA due to no change.    Participants: Jace Jain MD, Nursing, SW, OT.    The patient's care was discussed with the treatment team and chart notes were reviewed.       ATTESTATION    Jace Jain MD  [facility]   Psychiatry    Type of Service: video visit for mental health treatment  Reason for Video Visit: COVID-19 [place]  Originating Site (patient location):  [facility]  Distant Site (provider location): Remote Location  Mode of Communication: Video Conference via [employer]  Time of Service: Date: 07/22/2024 , Start: 07:30,  Stop: 08:00

## 2024-07-22 NOTE — PLAN OF CARE
"  Problem: Adult Behavioral Health Plan of Care  Goal: Patient-Specific Goal (Individualization)  Description: Patient will sleep 6 to 8 hours per night  Patient will eat at least 50% of meals  Patient will attend at least 50% of groups  Patient will comply with recommendations of treatment team  Patient will remain medication compliant      Outcome: Not Progressing    In bed this AM-opens eye to name. Mood is somewhat irritable. Pt states she does not want to talk with anybody as it does not help. Pt accepts Pristique, declines need for nicotine patch. This writer attempts to talk with pt who states \"I don't want to cry\"-pt is tearing up. Mood is initially dismissive and irritable--pt mood improves with talking.   Later pt requests to talk with this writer-pt is in the lounge eating a snack. Pt states she wants to leave. This writer verbalizes to pt she will most likely be in the hospital for duration of hold as she is refusing to participate in her plan of care.  Pt states she was at [facility] for 3 mos-2 mos inpt and one outpt. States it was \"traumatizing\" and she had to pay for a ride home. This writer suggests she give staff at this facility a chance to see if we can help her. Pt becomes tearful, then agrees to attempt interacting with staff more. Pt also states she gets along with her mother though mother does not want her taking medications-pt agrees she needs to take meds.  Pt attends one group and returns to bed.      Problem: Suicide Risk  Goal: Absence of Self-Harm  Description: Patient will deny SI by discharge.  Patient will remain free from self harm/injury during hospitalization.  Outcome: Not Progressing   Goal Outcome Evaluation:       Face to face end of shift report communicated to oncoming shift.     Christine Batista RN  7/22/2024  2:01 PM                       "

## 2024-07-22 NOTE — PLAN OF CARE
Face to face shift report received from Christine SOLARES RN. Rounding completed, pt observed.    Problem: Adult Behavioral Health Plan of Care  Goal: Patient-Specific Goal (Individualization)  Description: Patient will sleep 6 to 8 hours per night  Patient will eat at least 50% of meals  Patient will attend at least 50% of groups  Patient will comply with recommendations of treatment team  Patient will remain medication compliant      Outcome: Progressing  Note: Shift Summary:   Patient was resting in bed at the start of this shift.  Patient awakened for evening meal.  Denied needs at that time.  Admits to feeling depressed.  Dismissive and guarded.  States she does not want to be here.  No aggression.  Isolated to room.     Problem: Suicide Risk  Goal: Absence of Self-Harm  Description: Patient will deny SI by discharge.  Patient will remain free from self harm/injury during hospitalization.  7/22/2024 1700 by Sameera Torre RN  Outcome: Progressing    Face to face report will be communicated to oncoming RN.    Sameera Torre RN  7/22/2024

## 2024-07-22 NOTE — DISCHARGE INSTRUCTIONS
Behavioral Discharge Planning and Instructions    Summary:  Alba Lee is a 33 year old female with a past psychiatric history notable for depression, anxiety, and ADHD.     Main Diagnosis:     #Major Depressive Disorder, Recurrent, Severe without psychotic features  #Suicidal Ideation  #Hx of ADHD      Health Care Follow-up:     [facility]  [address],   [place] [address]  [phone]    [facility]  [address],   [place] [address]  [phone]  Medication Management: Aicha Escobar- August 14th @ 3:30pm  Therapy: Shani - July 30th @ 2:00pm- Will need to talk with current therapist to work on getting a new one.     IRTS Referrals:    [facility]  [address],   [place] [address] [phone]  On waitlist, about 90 days.     [facility]   [address] [place] [id number] [phone]      Attend all scheduled appointments with your outpatient providers. Call at least 24 hours in advance if you need to reschedule an appointment to ensure continued access to your outpatient providers.     Major Treatments, Procedures and Findings:  You were provided with: a psychiatric assessment, assessed for medical stability, medication evaluation and/or management, group therapy, family therapy, individual therapy, CD evaluation/assessment, milieu management, and medical interventions    Symptoms to Report: feeling more aggressive, increased confusion, losing more sleep, mood getting worse, or thoughts of suicide    Early warning signs can include: increased depression or anxiety sleep disturbances increased thoughts or behaviors of suicide or self-harm  increased unusual thinking, such as paranoia or hearing voices    Safety and Wellness:  Take all medicines as directed.  Make no changes unless your doctor suggests them.      Follow treatment recommendations.  Refrain from alcohol and non-prescribed drugs.  Ask your support system to  "help you reduce your access to items that could harm yourself or others. Items could include:  Firearms  Medicines (both prescribed and over-the-counter)  Knives and other sharp objects  Ropes and like materials  Car keys  If there is a concern for safety, call 911. If there is a concern for safety, call 911.    Resources:   Crisis Intervention: 913.982.7936 or 353-781-4845 (TTY: 167.312.7130).  Call anytime for help.  National [place] on Mental Illness (www.[place].sarah.org): 782.982.9695 or 341-438-4400.  [place] Association for Children's Mental Health (www.mac.org): 877.270.5323.  Alcoholics Anonymous (www.alcoholics-anonymous.org): Check your phone book for your local chapter.  Suicide Awareness Voices of Education (SAVE) (www.save.org): 920-754-DZLJ (6029)  National Suicide Prevention Line (www.mentalhealthmn.org): 720-512-EDOI (5555)  Mental Health Consumer/Survivor Network of [place] (www.mhcsn.net): 550.327.4802 or 662-044-0803  Mental Health Association of [place] (www.mentalhealth.org): 579.198.7915 or 694-241-4377  Self- Management and Recovery Training., SMART-- Toll free: 349.818.3937  www.[employer]  Text 4 Life: txt \"LIFE\" to 13031 for immediate support and crisis intervention  Crisis text line: Text \"[place]\" to 395387. Free, confidential, 24/7.  Crisis Intervention: 142.253.7399 or 005-022-1709. Call anytime for help.     General Medication Instructions:   See your medication sheet(s) for instructions.   Take all medicines as directed.  Make no changes unless your doctor suggests them.   Go to all your doctor visits.  Be sure to have all your required lab tests. This way, your medicines can be refilled on time.  Do not use any drugs not prescribed by your doctor.  Avoid alcohol.    Advance Directives:   Scanned document on file with [facility]? No scanned doc  Is document scanned? Pt states no documents  Honoring Choices Your Rights Handout: Informed and given  Was more information offered? Pt declined    The " Treatment team has appreciated the opportunity to work with you. If you have any questions or concerns about your recent admission, you can contact the unit which can receive your call 24 hours a day, 7 days a week. They will be able to get in touch with a Provider if needed. The unit number is [phone] .

## 2024-07-23 PROCEDURE — 99239 HOSP IP/OBS DSCHRG MGMT >30: CPT | Mod: 95 | Performed by: PSYCHIATRY & NEUROLOGY

## 2024-07-23 PROCEDURE — 124N000001 HC R&B MH

## 2024-07-23 PROCEDURE — 250N000013 HC RX MED GY IP 250 OP 250 PS 637: Performed by: NURSE PRACTITIONER

## 2024-07-23 PROCEDURE — 250N000013 HC RX MED GY IP 250 OP 250 PS 637: Performed by: PSYCHIATRY & NEUROLOGY

## 2024-07-23 RX ORDER — SENNOSIDES 8.6 MG
8.6 TABLET ORAL ONCE
Status: COMPLETED | OUTPATIENT
Start: 2024-07-23 | End: 2024-07-23

## 2024-07-23 RX ORDER — DESVENLAFAXINE 25 MG/1
25 TABLET, EXTENDED RELEASE ORAL DAILY
Qty: 30 TABLET | Refills: 0 | Status: SHIPPED | OUTPATIENT
Start: 2024-07-24 | End: 2024-08-23

## 2024-07-23 RX ADMIN — OLANZAPINE 10 MG: 10 TABLET, FILM COATED ORAL at 20:13

## 2024-07-23 RX ADMIN — DESVENLAFAXINE SUCCINATE 25 MG: 25 TABLET, EXTENDED RELEASE ORAL at 10:32

## 2024-07-23 RX ADMIN — SENNOSIDES 8.6 MG: 8.6 TABLET, FILM COATED ORAL at 20:51

## 2024-07-23 ASSESSMENT — ACTIVITIES OF DAILY LIVING (ADL)
ADLS_ACUITY_SCORE: 31
ORAL_HYGIENE: INDEPENDENT
ADLS_ACUITY_SCORE: 31
DRESS: SCRUBS (BEHAVIORAL HEALTH);INDEPENDENT
ORAL_HYGIENE: INDEPENDENT
HYGIENE/GROOMING: INDEPENDENT
HYGIENE/GROOMING: INDEPENDENT
ADLS_ACUITY_SCORE: 31
DRESS: SCRUBS (BEHAVIORAL HEALTH)
ADLS_ACUITY_SCORE: 31
ADLS_ACUITY_SCORE: 31
LAUNDRY: UNABLE TO COMPLETE
ADLS_ACUITY_SCORE: 31

## 2024-07-23 NOTE — PLAN OF CARE
Problem: Adult Behavioral Health Plan of Care  Goal: Patient-Specific Goal (Individualization)  Description: Patient will sleep 6 to 8 hours per night  Patient will eat at least 50% of meals  Patient will attend at least 50% of groups  Patient will comply with recommendations of treatment team  Patient will remain medication compliant  Outcome: Progressing     Problem: Suicide Risk  Goal: Absence of Self-Harm  Description: Patient will deny SI by discharge.  Patient will remain free from self harm/injury during hospitalization.  Outcome: Progressing     Face to face shift report received from Sameera. Rounding completed, patient laying in bed, appeared to be sleeping, respirations noted.    Patient appeared to be sleeping for approximately 11.5 hours since 1830 last shift.    Patient had no reported or observed suicidal behavior or self harm this shift.      Face to face report will be communicated to oncoming RN.    Christine Red RN  7/23/2024  6:10 AM

## 2024-07-23 NOTE — DISCHARGE SUMMARY
[facility] PSYCHIATRY  DISCHARGE SUMMARY     DISCHARGE DATA     Alba Lee MRN# [id number]   Age: 33 year old YOB: 1990     Date of Admission: 2024  Date of Discharge: 2024  Discharge Provider: Jace Jain MD       REASON FOR ADMISSION   Alba Lee is a 33 year old female with a past psychiatric history notable for depression, anxiety, and ADHD. She has prior inpatient psychiatric hospitalizations. Recently admitted at [facility] from 10/23-, placed on MI commitment that  in 2024. Presents to outside emergency department accompanied by law enforcement for worsening depressive symptoms, increased crying jags and reports of suicidal ideation with plan to jump off a bridge (although is denying this stating her mother said this and not her in order to get hospitalized). She was placed on an involuntary 72 hour hold. Patient was subsequently transferred and accepted for inpatient psychiatric hospitalization at [facility] for further safety and further stabilization        DISCHARGE DIAGNOSES   #Major Depressive Disorder, Recurrent, Severe without psychotic features  #Suicidal Ideation  #Hx of ADHD  #Medication nonadherence       HOSPITAL COURSE   Patient was admitted to unit 5 due to the aforementioned presentation. The patient was placed under 15 minute checks to ensure patient safety. The patient participated in unit programming and groups as able.    Legal status during hospitalization was involuntary .Ms. Kasey Lee did not require seclusion/restraint during hospitalization.     We reviewed with Ms. Kasey Lee current and past medication trials including duration, dose, response and side effects. During this hospitalization, the following changes to the patient's psychotropic medications were made:      PTA medications held:   -adderall      PTA medications continued/changed:   -none     New  medications initiated:   -pristiq  25 mg q daily (work with outpatient provider to monitor and titrate up further if needed)    Ms. Kasey Lee progressed slowly at first related to response to medication changes, struggle with acceptance of psychosocial stressors, difficulty accepting illness, urgency to leave due to family and work pressure, confidence in skills to manage symptoms, establishment of a supportive community network, and psychosocial stressors outside hospital  . By the time of discharge, her symptoms had improved adequately.  Depression improved with increased confidence in ability to manage symptoms., Anxiety improved with increased confidence in ability to manage symptoms., Chronic suicidal ideation returned to baseline as compared to its elevation upon admission., and Psychotropic medications utilized were found to be helpful without significant adverse side effects. The treatment goals (long-term goal/discharge criteria) were reached.     With the aforementioned changes and supports the patient noticed improvement in their symptoms and felt sufficiently ready for discharge. As a result, Alba Lee was discharged. At the time of discharge, Alba Lee was determined to not be a danger to self or others. The patient was also medically stable for discharge. At the current time of discharge, the patient does not meet criteria for involuntary hospitalization. On the day of discharge, the patient reports that they do not have suicidal or homicidal ideation. Steps taken to minimize risk include: assessing patient s behavior and thought process daily during hospital stay, discharging patient with adequate plan for follow up for mental and physical health and discussing safety plan of returning to the hospital should the patient ever have thoughts of harming themselves or others. Therefore, based on all available evidence including the factors cited above, the patient does not  appear to be at imminent risk for self-harm, and is appropriate for outpatient level of care. The acute crisis is resolved and Alba is discharging in improved condition. Though Alba's acute crisis has resolved, there remains a higher risk of suicide over the long term compared to the general population. Factors that may be associated with relapse include worsening of depressive symptoms, alcohol use, illicit substance use, not taking medications, lack of mental health follow-up appointments and work/family/relationship stressors. Alba Lee has a plan in place to mitigate these stressors, is hopeful about the future ,and is not assessed to be a imminent risk to self or anyone else and thus is not assessed to be holdable.  Alba has received maximal benefit from the current hospital stay. Alba Lee set to discharge.        DISCHARGE MEDICATIONS     Current Discharge Medication List        CONTINUE these medications which have CHANGED    Details   desvenlafaxine succinate (PRISTIQ) 25 MG 24 hr tablet Take 1 tablet (25 mg) by mouth daily for 30 days  Qty: 30 tablet, Refills: 0    Associated Diagnoses: Severe recurrent major depression without psychotic features (H)           CONTINUE these medications which have NOT CHANGED    Details   ACETAMINOPHEN PO Take 1-2 tablets by mouth as needed (headaches)      desogestrel-ethinyl estradiol (APRI) 0.15-30 MG-MCG tablet Take 1 tablet by mouth daily  Qty: 84 tablet, Refills: 4    Associated Diagnoses: Birth control counseling      ibuprofen (ADVIL/MOTRIN) 200 MG tablet Take 400 mg by mouth as needed (headaches)      melatonin 5 MG tablet Take 5 mg by mouth nightly as needed for sleep           STOP taking these medications       amphetamine-dextroamphetamine (ADDERALL XR) 30 MG 24 hr capsule Comments:   Reason for Stopping:         amphetamine-dextroamphetamine (ADDERALL) 30 MG tablet Comments:   Reason for Stopping:         propranolol (INDERAL) 10 MG  "tablet Comments:   Reason for Stopping:                  VITALS   Vitals: BP 97/58   Pulse 85   Temp 98.3  F (36.8  C) (Temporal)   Resp 16   SpO2 97%      MENTAL STATUS EXAM   Appearance: Alert, oriented, dressed in hospital scrubs, appears stated age   Attitude: Cooperative   Eye Contact: Good  Mood: \"Better\"  Affect: Full range of affect  Speech: Normal rate and rhythm   Psychomotor Behavior: No tremor, rigidity, or psychomotor abnormality   Thought Process: Logical, goal directed   Associations: No loose associations   Thought Content: Denies SI or plan. No SIB. Denies A/V hallucinations. No evidence of delusional thought.  Insight: Good  Judgment: Good  Oriented to: Person, place, and time  Attention Span and Concentration: Intact  Recent and Remote Memory: Intact  Language: English with appropriate syntax and vocabulary  Fund of Knowledge: Average  Muscle Strength and Tone: Grossly normal  Gait and Station: Grossly normal       DISCHARGE PLAN     1.  Education given regarding diagnostic and treatment options with risks, benefits and alternatives with adequate verbalization of understanding.  2.  Discharge to home. Upon detailed review of risk factors, patient amenable for release.   3.  Continue aforementioned medications and associated medication changes with follow-up by outpatient provider.  4.  Crisis management planning in place.    5.  Nursing and  to review further discharge recommendations.   6.  Active issues: No active medical issues requiring immediate follow-up care.  7.  Patient is being discharged with the following appointments as detailed below:    See AVS       DISCHARGE SERVICES PROVIDED     80 minutes spent on discharge services, including:  Final examination of patient.  Review and discussion of hospital stay.  Instructions for continued outpatient care/goals.  Preparation of discharge records.  Preparation of medications refills and new prescriptions.  Preparation of " applicable referral forms.        ATTESTATION   Jace Jain MD  [facility]   Psychiatry  Type of Service: video visit for mental health treatment  Reason for Video Visit: COVID-19 [place]  Originating Site (patient location): [facility]  Distant Site (provider location): Remote Location  Mode of Communication: Video Conference via [employer]  Time of Service: Date: July 23, 2024 , Start: 07:00,  Stop: 08:00     LABS THIS ADMISSION     No results found for any visits on 07/20/24.

## 2024-07-23 NOTE — PROGRESS NOTES
Psychiatry and therapy set up. [facility] referrals sent to [facility] and [facility]. On waitlist for [facility]. Called [facility] for a ride for tomorrow, waiting for a call back to see if they are able.     [facility] will be here at 11:00am to pick pt up for discharge.     [address],   [place] [address]   [phone]   Medication Management: Aicha Escobar- August 14th @ 3:30pm   Therapy: [facility] - July 30th @ 2:00pm- Will need to talk with current therapist to work on getting a new one.

## 2024-07-23 NOTE — PLAN OF CARE
Problem: Adult Behavioral Health Plan of Care  Goal: Patient-Specific Goal (Individualization)  Description: Patient will sleep 6 to 8 hours per night  Patient will eat at least 50% of meals  Patient will attend at least 50% of groups  Patient will comply with recommendations of treatment team  Patient will remain medication compliant      Outcome: Progressing    A&O, VSS, denies pain.   Remains isolative and withdrawn. Out for meals before returning to room. Pt expresses hopefulness when talking of her future plans. States she may want to go to an Irts for programing. Pt states she knows she needs to take medications as well.   Denies all criteria including: SI, SIB, HI or hallucinations.       Problem: Suicide Risk  Goal: Absence of Self-Harm  Description: Patient will deny SI by discharge.  Patient will remain free from self harm/injury during hospitalization.  Outcome: Progressing         Face to face end of shift report communicated to oncoming shift.     Christine Batista RN  7/23/2024  12:44 PM

## 2024-07-24 VITALS
SYSTOLIC BLOOD PRESSURE: 102 MMHG | DIASTOLIC BLOOD PRESSURE: 61 MMHG | HEART RATE: 63 BPM | TEMPERATURE: 97 F | OXYGEN SATURATION: 97 % | RESPIRATION RATE: 14 BRPM

## 2024-07-24 PROCEDURE — 250N000013 HC RX MED GY IP 250 OP 250 PS 637: Performed by: PSYCHIATRY & NEUROLOGY

## 2024-07-24 RX ADMIN — DESVENLAFAXINE SUCCINATE 25 MG: 25 TABLET, EXTENDED RELEASE ORAL at 08:29

## 2024-07-24 ASSESSMENT — ACTIVITIES OF DAILY LIVING (ADL)
ORAL_HYGIENE: INDEPENDENT
HYGIENE/GROOMING: INDEPENDENT
ADLS_ACUITY_SCORE: 31
DRESS: SCRUBS (BEHAVIORAL HEALTH);INDEPENDENT
ADLS_ACUITY_SCORE: 31
ADLS_ACUITY_SCORE: 31
LAUNDRY: UNABLE TO COMPLETE
ADLS_ACUITY_SCORE: 31
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
## 2024-07-24 NOTE — PLAN OF CARE
Discharge Note    Patient Discharged to home on 7/24/2024 10:53 AM via [facility] accompanied by Staff.     Patient informed of discharge instructions in AVS. patient verbalizes understanding and denies having any questions pertaining to AVS. Patient stable at time of discharge. Patient denies SI, HI, and thoughts of self harm at time of discharge. All personal belongings returned to patient. Discharge prescription from [facility] sent with Pt. Still sealed in pharmacy bag. AVS sent with Pt.    Tonya Shah RN  7/24/2024  10:53 AM    Shift Summary:  Face to face shift report received from BETH King. Rounding completed, pt observed resting in bed at start of shift.  Problem: Adult Behavioral Health Plan of Care  Goal: Patient-Specific Goal (Individualization)  Description: Patient will sleep 6 to 8 hours per night  Patient will eat at least 50% of meals  Patient will attend at least 50% of groups  Patient will comply with recommendations of treatment team  Patient will remain medication compliant      Outcome: Progressing     Problem: Suicide Risk  Goal: Absence of Self-Harm  Description: Patient will deny SI by discharge.  Patient will remain free from self harm/injury during hospitalization.  Outcome: Progressing   Goal Outcome Evaluation:       Pt. Denied having any physical pain this shift. They did have some anxiety and depression. SI and thoughts of self-harm were both denied by Pt. Pt. Spent most of the shift in their room, but did come ou during breakfast. 100% was eaten then. No groups were attended this shift. A shower was taken. Pt. Discharged from unit at 1053.      Face to face report will be communicated to oncoming RN.    Tonya Shah RN  7/24/2024  10:54 AM

## 2024-07-24 NOTE — PROGRESS NOTES
Pt is discharging at the recommendation of the treatment team. Pt is discharging to home transported by [facility]. Pt denies having any thoughts of hurting themself or anyone else. Pt denies anxiety or depression. Pt has follow up with psychiatry and therapy. Discharge instructions, including; demographic sheet, psychiatric evaluation, discharge summary, and AVS were faxed to these next level of care providers.     [facility] will be here at 11:00am to  pt.     [address],   [place] [address]   [phone]   Medication Management: Aicha Escobar- August 14th @ 3:30pm   Therapy: [facility]ani - July 30th @ 2:00pm- Will need to talk with current therapist to work on getting a new one.

## 2024-07-24 NOTE — PLAN OF CARE
Problem: Adult Behavioral Health Plan of Care  Goal: Patient-Specific Goal (Individualization)  Description: Patient will sleep 6 to 8 hours per night  Patient will eat at least 50% of meals  Patient will attend at least 50% of groups  Patient will comply with recommendations of treatment team  Patient will remain medication compliant  Outcome: Progressing     Problem: Suicide Risk  Goal: Absence of Self-Harm  Description: Patient will deny SI by discharge.  Patient will remain free from self harm/injury during hospitalization.  Outcome: Progressing     Face to face shift report received from Eveline. Rounding completed, patient laying in bed, appeared to be sleeping, respirations noted.    Patient appeared to be sleeping for approximately 6 hours since about midnight.    Patient had no reported or observed suicidal behavior or self harm this shift.      Face to face report will be communicated to oncoming RN.    Christine Red RN  7/24/2024  6:11 AM

## 2024-07-25 ENCOUNTER — TELEPHONE (OUTPATIENT)
Dept: BEHAVIORAL HEALTH | Facility: HOSPITAL | Age: 34
End: 2024-07-25

## 2024-07-25 NOTE — TELEPHONE ENCOUNTER
"[facility]: Post-Hospital Discharge Note     Situation   Post hospital discharge call placed 24.      Alba did not answer. A message was not left as the mailbox was full.  Messages are left with a call back number should they need to reach staff with any questions, need for additional resources, or need assistance setting up a post hospitalization follow up appointments, if unable to do so independently.    Inpatient Mental Health Admission Information:  Admission Date: 24  Admission Reason: Alba Lee is a 33 year old female with a past psychiatric history notable for depression, anxiety, and ADHD. She has prior inpatient psychiatric hospitalizations. Recently admitted at [facility] from 10/23-, placed on MI commitment that  in 2024. Presents to outside emergency department accompanied by law enforcement for worsening depressive symptoms, increased crying jags and reports of suicidal ideation with plan to jump off a bridge (although is denying this stating her mother said this and not her in order to get hospitalized). She was placed on an involuntary 72 hour hold. Patient was subsequently transferred and accepted for inpatient psychiatric hospitalization at [facility] for further safety and further stabilization     Inpatient Mental Health Discharge Information:  Discharge Date: 24  Discharged to: home/ self care  Discharge Diagnosis: #Major Depressive Disorder, Recurrent, Severe without psychotic features  #Suicidal Ideation  #Hx of ADHD    Background    The following information is obtained from the hospital after visit summary and inpatient provider notes.     \"Patient was admitted to unit 5 due to the aforementioned presentation. The patient was placed under 15 minute checks to ensure patient safety. The patient participated in unit programming and groups as able.     Legal status during hospitalization was involuntary .Ms. Parks" Rosa did not require seclusion/restraint during hospitalization.      We reviewed with Ms. Kasey Lee current and past medication trials including duration, dose, response and side effects. During this hospitalization, the following changes to the patient's psychotropic medications were made:      PTA medications held:   -adderall      PTA medications continued/changed:   -none     New medications initiated:   -pristiq  25 mg q daily (work with outpatient provider to monitor and titrate up further if needed)     Ms. Kasey Lee progressed slowly at first related to response to medication changes, struggle with acceptance of psychosocial stressors, difficulty accepting illness, urgency to leave due to family and work pressure, confidence in skills to manage symptoms, establishment of a supportive community network, and psychosocial stressors outside hospital  . By the time of discharge, her symptoms had improved adequately.  Depression improved with increased confidence in ability to manage symptoms., Anxiety improved with increased confidence in ability to manage symptoms., Chronic suicidal ideation returned to baseline as compared to its elevation upon admission., and Psychotropic medications utilized were found to be helpful without significant adverse side effects. The treatment goals (long-term goal/discharge criteria) were reached.      With the aforementioned changes and supports the patient noticed improvement in their symptoms and felt sufficiently ready for discharge. As a result, Alba Lee was discharged. At the time of discharge, Alba Lee was determined to not be a danger to self or others. The patient was also medically stable for discharge. At the current time of discharge, the patient does not meet criteria for involuntary hospitalization. On the day of discharge, the patient reports that they do not have suicidal or homicidal ideation. Steps taken to minimize risk  "include: assessing patient s behavior and thought process daily during hospital stay, discharging patient with adequate plan for follow up for mental and physical health and discussing safety plan of returning to the hospital should the patient ever have thoughts of harming themselves or others. Therefore, based on all available evidence including the factors cited above, the patient does not appear to be at imminent risk for self-harm, and is appropriate for outpatient level of care. The acute crisis is resolved and Alba is discharging in improved condition. Though Alba's acute crisis has resolved, there remains a higher risk of suicide over the long term compared to the general population. Factors that may be associated with relapse include worsening of depressive symptoms, alcohol use, illicit substance use, not taking medications, lack of mental health follow-up appointments and work/family/relationship stressors. Alba Lee has a plan in place to mitigate these stressors, is hopeful about the future ,and is not assessed to be a imminent risk to self or anyone else and thus is not assessed to be holdable.  Alba has received maximal benefit from the current hospital stay. Alba Lee set to discharge. \"        Post Discharge Assessment   How have your symptoms been since being discharged from the hospital? Unable to reach patient  Do you have your discharge instructions/after visit summary? NA  Do you have any questions related to your discharge instructions? NA    Discharge Medication Assessment   Medications were reviewed in full on discharge, including: Medications to be started, medications to be stopped, medications to be continued from preadmission and any side effects.      Prescriptions were e-scribed or sent to their preferred pharmacy at discharge and were able to be filled: NA  Do you have any questions about your medications? NA    Outpatient Plan/Future Appointments  Discharge " follow up appointment scheduled within 14 days of discharging from hospital? Yes   Health Care Follow-up:      [facility]  [address],   [place] [address]  [phone]     [facility]  [address] [place] [address]  [phone]  Medication Management: Aicha Escobar- August 14th @ 3:30pm  Therapy: Shani - July 30th @ 2:00pm- Will need to talk with current therapist to work on getting a new one.      IRTS Referrals:     [facility]  [address],   [place] [address] [phone]  On waitlist, about 90 days.      [facility]   [address] [place] [id number] [phone]      Further information, barriers, or follow up this writer has addressed: N/A

## 2024-08-20 ENCOUNTER — ALLIED HEALTH/NURSE VISIT (OUTPATIENT)
Dept: FAMILY MEDICINE | Facility: OTHER | Age: 34
End: 2024-08-20
Payer: COMMERCIAL

## 2024-08-20 ENCOUNTER — LAB (OUTPATIENT)
Dept: LAB | Facility: OTHER | Age: 34
End: 2024-08-20
Payer: COMMERCIAL

## 2024-08-20 DIAGNOSIS — Z79.899 MEDICATION MANAGEMENT: Primary | ICD-10-CM

## 2024-08-20 DIAGNOSIS — Z79.899 ENCOUNTER FOR LONG-TERM (CURRENT) USE OF MEDICATIONS: ICD-10-CM

## 2024-08-20 LAB
ALBUMIN SERPL BCG-MCNC: 4.4 G/DL (ref 3.5–5.2)
ALP SERPL-CCNC: 83 U/L (ref 40–150)
ALT SERPL W P-5'-P-CCNC: 14 U/L (ref 0–50)
ANION GAP SERPL CALCULATED.3IONS-SCNC: 9 MMOL/L (ref 7–15)
AST SERPL W P-5'-P-CCNC: 18 U/L (ref 0–45)
ATRIAL RATE - MUSE: 73 BPM
BASOPHILS # BLD AUTO: 0 10E3/UL (ref 0–0.2)
BASOPHILS NFR BLD AUTO: 1 %
BILIRUB SERPL-MCNC: 0.3 MG/DL
BUN SERPL-MCNC: 10.5 MG/DL (ref 6–20)
CALCIUM SERPL-MCNC: 9.1 MG/DL (ref 8.8–10.4)
CHLORIDE SERPL-SCNC: 105 MMOL/L (ref 98–107)
CHOLEST SERPL-MCNC: 157 MG/DL
CREAT SERPL-MCNC: 0.8 MG/DL (ref 0.51–0.95)
DIASTOLIC BLOOD PRESSURE - MUSE: NORMAL MMHG
EGFRCR SERPLBLD CKD-EPI 2021: >90 ML/MIN/1.73M2
EOSINOPHIL # BLD AUTO: 0.1 10E3/UL (ref 0–0.7)
EOSINOPHIL NFR BLD AUTO: 3 %
ERYTHROCYTE [DISTWIDTH] IN BLOOD BY AUTOMATED COUNT: 11.7 % (ref 10–15)
FASTING STATUS PATIENT QL REPORTED: NO
FASTING STATUS PATIENT QL REPORTED: NO
GLUCOSE SERPL-MCNC: 86 MG/DL (ref 70–99)
HBA1C MFR BLD: 4.6 % (ref 4–6.2)
HCO3 SERPL-SCNC: 27 MMOL/L (ref 22–29)
HCT VFR BLD AUTO: 38.8 % (ref 35–47)
HDLC SERPL-MCNC: 55 MG/DL
HGB BLD-MCNC: 13 G/DL (ref 11.7–15.7)
IMM GRANULOCYTES # BLD: 0 10E3/UL
IMM GRANULOCYTES NFR BLD: 0 %
INTERPRETATION ECG - MUSE: NORMAL
IRON SERPL-MCNC: 61 UG/DL (ref 37–145)
LDLC SERPL CALC-MCNC: 92 MG/DL
LYMPHOCYTES # BLD AUTO: 1.7 10E3/UL (ref 0.8–5.3)
LYMPHOCYTES NFR BLD AUTO: 32 %
MAGNESIUM SERPL-MCNC: 2.2 MG/DL (ref 1.7–2.3)
MCH RBC QN AUTO: 30.1 PG (ref 26.5–33)
MCHC RBC AUTO-ENTMCNC: 33.5 G/DL (ref 31.5–36.5)
MCV RBC AUTO: 90 FL (ref 78–100)
MONOCYTES # BLD AUTO: 0.4 10E3/UL (ref 0–1.3)
MONOCYTES NFR BLD AUTO: 8 %
NEUTROPHILS # BLD AUTO: 3 10E3/UL (ref 1.6–8.3)
NEUTROPHILS NFR BLD AUTO: 57 %
NONHDLC SERPL-MCNC: 102 MG/DL
NRBC # BLD AUTO: 0 10E3/UL
NRBC BLD AUTO-RTO: 0 /100
P AXIS - MUSE: 22 DEGREES
PLATELET # BLD AUTO: 245 10E3/UL (ref 150–450)
POTASSIUM SERPL-SCNC: 4.1 MMOL/L (ref 3.4–5.3)
PR INTERVAL - MUSE: 134 MS
PROLACTIN SERPL 3RD IS-MCNC: 25 NG/ML (ref 5–23)
PROT SERPL-MCNC: 7.1 G/DL (ref 6.4–8.3)
PTH-INTACT SERPL-MCNC: 34 PG/ML (ref 15–65)
QRS DURATION - MUSE: 78 MS
QT - MUSE: 406 MS
QTC - MUSE: 447 MS
R AXIS - MUSE: 69 DEGREES
RBC # BLD AUTO: 4.32 10E6/UL (ref 3.8–5.2)
SODIUM SERPL-SCNC: 141 MMOL/L (ref 135–145)
SYSTOLIC BLOOD PRESSURE - MUSE: NORMAL MMHG
T AXIS - MUSE: 62 DEGREES
T3FREE SERPL-MCNC: 2.6 PG/ML (ref 2–4.4)
T4 FREE SERPL-MCNC: 1.11 NG/DL (ref 0.9–1.7)
TRIGL SERPL-MCNC: 51 MG/DL
TSH SERPL DL<=0.005 MIU/L-ACNC: 2.08 UIU/ML (ref 0.3–4.2)
VENTRICULAR RATE- MUSE: 73 BPM
VIT B12 SERPL-MCNC: 668 PG/ML (ref 232–1245)
VIT D+METAB SERPL-MCNC: 24 NG/ML (ref 20–50)
WBC # BLD AUTO: 5.2 10E3/UL (ref 4–11)

## 2024-08-20 PROCEDURE — 36415 COLL VENOUS BLD VENIPUNCTURE: CPT | Mod: ZL

## 2024-08-20 PROCEDURE — 84146 ASSAY OF PROLACTIN: CPT | Mod: ZL

## 2024-08-20 PROCEDURE — 82607 VITAMIN B-12: CPT | Mod: ZL

## 2024-08-20 PROCEDURE — 93005 ELECTROCARDIOGRAM TRACING: CPT

## 2024-08-20 PROCEDURE — 85004 AUTOMATED DIFF WBC COUNT: CPT | Mod: ZL

## 2024-08-20 PROCEDURE — 93010 ELECTROCARDIOGRAM REPORT: CPT | Performed by: INTERNAL MEDICINE

## 2024-08-20 PROCEDURE — 83540 ASSAY OF IRON: CPT | Mod: ZL

## 2024-08-20 PROCEDURE — 84439 ASSAY OF FREE THYROXINE: CPT | Mod: ZL

## 2024-08-20 PROCEDURE — 80053 COMPREHEN METABOLIC PANEL: CPT | Mod: ZL

## 2024-08-20 PROCEDURE — 84443 ASSAY THYROID STIM HORMONE: CPT | Mod: ZL

## 2024-08-20 PROCEDURE — 83036 HEMOGLOBIN GLYCOSYLATED A1C: CPT | Mod: ZL

## 2024-08-20 PROCEDURE — 84481 FREE ASSAY (FT-3): CPT | Mod: ZL

## 2024-08-20 PROCEDURE — 82306 VITAMIN D 25 HYDROXY: CPT | Mod: ZL

## 2024-08-20 PROCEDURE — 80061 LIPID PANEL: CPT | Mod: ZL

## 2024-08-20 PROCEDURE — 83735 ASSAY OF MAGNESIUM: CPT | Mod: ZL

## 2024-08-20 PROCEDURE — 83970 ASSAY OF PARATHORMONE: CPT | Mod: ZL

## 2024-08-20 NOTE — PROGRESS NOTES
Pt presented to nurse injection room to get an EKG. EKG ordered by RODNEY Mayfield CNP due to medication monitoring. EKG performed and tracing faxed to ordering provider. EKG transmitted and imported to [employer], then tracing and written order sent for scanning into Epic.     Tiki Jorgensen RN ....................  8/20/2024   9:08 AM

## 2024-08-28 DIAGNOSIS — Z30.09 BIRTH CONTROL COUNSELING: ICD-10-CM

## 2024-08-28 RX ORDER — DESOGESTREL AND ETHINYL ESTRADIOL 0.15-0.03
1 KIT ORAL DAILY
Qty: 28 TABLET | Refills: 0 | OUTPATIENT
Start: 2024-08-28

## 2024-08-28 NOTE — TELEPHONE ENCOUNTER
Refill request refused, with note to pharmacy.  Needs appointment.      Requested Prescriptions   Pending Prescriptions Disp Refills    desogestrel-ethinyl estradiol (APRI) 0.15-30 MG-MCG tablet 28 tablet 0     Sig: Take 1 tablet by mouth daily.       There is no refill protocol information for this order        Last Prescription Date:   08/26/2024  Last Fill Qty/Refills:         28, R-0    Needs appointment.     Jeannette Jeff RN on 8/28/2024 at 2:55 PM

## 2024-08-30 DIAGNOSIS — Z30.09 BIRTH CONTROL COUNSELING: ICD-10-CM

## 2024-09-23 ENCOUNTER — MYC REFILL (OUTPATIENT)
Dept: OBGYN | Facility: OTHER | Age: 34
End: 2024-09-23
Payer: COMMERCIAL

## 2024-09-23 DIAGNOSIS — Z30.09 BIRTH CONTROL COUNSELING: ICD-10-CM

## 2024-09-23 RX ORDER — DESOGESTREL AND ETHINYL ESTRADIOL 0.15-0.03
1 KIT ORAL DAILY
Qty: 28 TABLET | Refills: 0 | OUTPATIENT
Start: 2024-09-23

## 2024-09-23 NOTE — TELEPHONE ENCOUNTER
Prescription refused.  This is a duplicate request.       Requested Prescriptions   Pending Prescriptions Disp Refills    desogestrel-ethinyl estradiol (APRI) 0.15-30 MG-MCG tablet 28 tablet 0     Sig: Take 1 tablet by mouth daily.        Last Prescription Date:   08/26/2024  Last Fill Qty/Refills:         28, R-0    Jeannette Jeff RN on 9/23/2024 at 1:04 PM

## 2024-09-25 ENCOUNTER — OFFICE VISIT (OUTPATIENT)
Dept: OBGYN | Facility: OTHER | Age: 34
End: 2024-09-25
Payer: COMMERCIAL

## 2024-09-25 VITALS
RESPIRATION RATE: 20 BRPM | SYSTOLIC BLOOD PRESSURE: 122 MMHG | HEART RATE: 120 BPM | WEIGHT: 148 LBS | BODY MASS INDEX: 27.23 KG/M2 | OXYGEN SATURATION: 97 % | DIASTOLIC BLOOD PRESSURE: 80 MMHG | HEIGHT: 62 IN

## 2024-09-25 DIAGNOSIS — Z30.09 BIRTH CONTROL COUNSELING: ICD-10-CM

## 2024-09-25 PROCEDURE — 99213 OFFICE O/P EST LOW 20 MIN: CPT

## 2024-09-25 PROCEDURE — G0463 HOSPITAL OUTPT CLINIC VISIT: HCPCS

## 2024-09-25 RX ORDER — DEXTROAMPHETAMINE SACCHARATE, AMPHETAMINE ASPARTATE MONOHYDRATE, DEXTROAMPHETAMINE SULFATE AND AMPHETAMINE SULFATE 5; 5; 5; 5 MG/1; MG/1; MG/1; MG/1
20 CAPSULE, EXTENDED RELEASE ORAL EVERY MORNING
COMMUNITY
Start: 2024-09-11

## 2024-09-25 RX ORDER — PRAZOSIN HYDROCHLORIDE 1 MG/1
1 CAPSULE ORAL AT BEDTIME
COMMUNITY
Start: 2024-09-24

## 2024-09-25 RX ORDER — BREXPIPRAZOLE 2 MG/1
2 TABLET ORAL AT BEDTIME
COMMUNITY
Start: 2024-09-11

## 2024-09-25 RX ORDER — DESOGESTREL AND ETHINYL ESTRADIOL 0.15-0.03
1 KIT ORAL DAILY
Qty: 84 TABLET | Refills: 3 | Status: SHIPPED | OUTPATIENT
Start: 2024-09-25

## 2024-09-25 RX ORDER — PRAZOSIN HYDROCHLORIDE 2 MG/1
2 CAPSULE ORAL AT BEDTIME
COMMUNITY
Start: 2024-09-24

## 2024-09-25 RX ORDER — DESVENLAFAXINE 50 MG/1
50 TABLET, EXTENDED RELEASE ORAL DAILY
COMMUNITY
Start: 2024-09-24

## 2024-09-25 RX ORDER — DESVENLAFAXINE 100 MG/1
1 TABLET, EXTENDED RELEASE ORAL
COMMUNITY
Start: 2024-09-17

## 2024-09-25 ASSESSMENT — PATIENT HEALTH QUESTIONNAIRE - PHQ9
10. IF YOU CHECKED OFF ANY PROBLEMS, HOW DIFFICULT HAVE THESE PROBLEMS MADE IT FOR YOU TO DO YOUR WORK, TAKE CARE OF THINGS AT HOME, OR GET ALONG WITH OTHER PEOPLE: SOMEWHAT DIFFICULT
SUM OF ALL RESPONSES TO PHQ QUESTIONS 1-9: 8
SUM OF ALL RESPONSES TO PHQ QUESTIONS 1-9: 8

## 2024-09-25 ASSESSMENT — PAIN SCALES - GENERAL: PAINLEVEL: NO PAIN (0)

## 2024-09-25 NOTE — NURSING NOTE
"Chief Complaint   Patient presents with    Recheck Medication     Yearly birth control      Islibloom is what she usually takes .  Initial /80   Pulse 120   Resp 20   Ht 1.575 m (5' 2\")   Wt 67.1 kg (148 lb)   LMP 09/23/2024 (Exact Date)   SpO2 97%   BMI 27.07 kg/m   Estimated body mass index is 27.07 kg/m  as calculated from the following:    Height as of this encounter: 1.575 m (5' 2\").    Weight as of this encounter: 67.1 kg (148 lb).  Medication Reconciliation: complete    Mildred Goel LPN    "
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
## 2024-12-20 ENCOUNTER — HOSPITAL ENCOUNTER (EMERGENCY)
Facility: OTHER | Age: 34
Discharge: HOME | End: 2024-12-20
Attending: FAMILY MEDICINE | Admitting: FAMILY MEDICINE
Payer: COMMERCIAL

## 2024-12-20 VITALS
SYSTOLIC BLOOD PRESSURE: 153 MMHG | WEIGHT: 142.2 LBS | HEIGHT: 62 IN | BODY MASS INDEX: 26.17 KG/M2 | HEART RATE: 119 BPM | DIASTOLIC BLOOD PRESSURE: 85 MMHG | OXYGEN SATURATION: 99 % | RESPIRATION RATE: 22 BRPM | TEMPERATURE: 97.5 F

## 2024-12-20 DIAGNOSIS — F41.1 GENERALIZED ANXIETY DISORDER: ICD-10-CM

## 2024-12-20 DIAGNOSIS — F41.8 DEPRESSION WITH ANXIETY: ICD-10-CM

## 2024-12-20 LAB
ALBUMIN UR-MCNC: 20 MG/DL
AMPHETAMINES UR QL SCN: ABNORMAL
ANION GAP SERPL CALCULATED.3IONS-SCNC: 10 MMOL/L (ref 7–15)
APAP SERPL-MCNC: <5 UG/ML (ref 10–30)
APPEARANCE UR: ABNORMAL
BACTERIA #/AREA URNS HPF: ABNORMAL /HPF
BARBITURATES UR QL SCN: ABNORMAL
BASOPHILS # BLD AUTO: 0 10E3/UL (ref 0–0.2)
BASOPHILS NFR BLD AUTO: 0 %
BENZODIAZ UR QL SCN: ABNORMAL
BILIRUB UR QL STRIP: NEGATIVE
BUN SERPL-MCNC: 5.2 MG/DL (ref 6–20)
BZE UR QL SCN: ABNORMAL
CALCIUM SERPL-MCNC: 9.3 MG/DL (ref 8.8–10.4)
CANNABINOIDS UR QL SCN: ABNORMAL
CHLORIDE SERPL-SCNC: 102 MMOL/L (ref 98–107)
COLOR UR AUTO: ABNORMAL
CREAT SERPL-MCNC: 0.75 MG/DL (ref 0.51–0.95)
EGFRCR SERPLBLD CKD-EPI 2021: >90 ML/MIN/1.73M2
EOSINOPHIL # BLD AUTO: 0.1 10E3/UL (ref 0–0.7)
EOSINOPHIL NFR BLD AUTO: 1 %
ERYTHROCYTE [DISTWIDTH] IN BLOOD BY AUTOMATED COUNT: 11.9 % (ref 10–15)
ETHANOL SERPL-MCNC: <0.01 G/DL
FENTANYL UR QL: ABNORMAL
GLUCOSE SERPL-MCNC: 89 MG/DL (ref 70–99)
GLUCOSE UR STRIP-MCNC: NEGATIVE MG/DL
HCG UR QL: NEGATIVE
HCO3 SERPL-SCNC: 27 MMOL/L (ref 22–29)
HCT VFR BLD AUTO: 42.1 % (ref 35–47)
HGB BLD-MCNC: 14 G/DL (ref 11.7–15.7)
HGB UR QL STRIP: ABNORMAL
IMM GRANULOCYTES # BLD: 0 10E3/UL
IMM GRANULOCYTES NFR BLD: 0 %
KETONES UR STRIP-MCNC: 20 MG/DL
LEUKOCYTE ESTERASE UR QL STRIP: NEGATIVE
LYMPHOCYTES # BLD AUTO: 1.6 10E3/UL (ref 0.8–5.3)
LYMPHOCYTES NFR BLD AUTO: 31 %
MCH RBC QN AUTO: 29.1 PG (ref 26.5–33)
MCHC RBC AUTO-ENTMCNC: 33.3 G/DL (ref 31.5–36.5)
MCV RBC AUTO: 88 FL (ref 78–100)
MONOCYTES # BLD AUTO: 0.4 10E3/UL (ref 0–1.3)
MONOCYTES NFR BLD AUTO: 7 %
MUCOUS THREADS #/AREA URNS LPF: PRESENT /LPF
NEUTROPHILS # BLD AUTO: 3.2 10E3/UL (ref 1.6–8.3)
NEUTROPHILS NFR BLD AUTO: 61 %
NITRATE UR QL: NEGATIVE
NRBC # BLD AUTO: 0 10E3/UL
NRBC BLD AUTO-RTO: 0 /100
OPIATES UR QL SCN: ABNORMAL
PCP QUAL URINE (ROCHE): ABNORMAL
PH UR STRIP: 7 [PH] (ref 5–9)
PLATELET # BLD AUTO: 291 10E3/UL (ref 150–450)
POTASSIUM SERPL-SCNC: 4.1 MMOL/L (ref 3.4–5.3)
RBC # BLD AUTO: 4.81 10E6/UL (ref 3.8–5.2)
RBC URINE: 2 /HPF
SALICYLATES SERPL-MCNC: <0.3 MG/DL
SODIUM SERPL-SCNC: 139 MMOL/L (ref 135–145)
SP GR UR STRIP: 1.01 (ref 1–1.03)
SQUAMOUS EPITHELIAL: 4 /HPF
UROBILINOGEN UR STRIP-MCNC: NORMAL MG/DL
WBC # BLD AUTO: 5.3 10E3/UL (ref 4–11)
WBC URINE: 3 /HPF

## 2024-12-20 PROCEDURE — 81025 URINE PREGNANCY TEST: CPT | Performed by: FAMILY MEDICINE

## 2024-12-20 PROCEDURE — 85004 AUTOMATED DIFF WBC COUNT: CPT | Performed by: FAMILY MEDICINE

## 2024-12-20 PROCEDURE — 80179 DRUG ASSAY SALICYLATE: CPT | Performed by: FAMILY MEDICINE

## 2024-12-20 PROCEDURE — 82077 ASSAY SPEC XCP UR&BREATH IA: CPT | Performed by: FAMILY MEDICINE

## 2024-12-20 PROCEDURE — 99283 EMERGENCY DEPT VISIT LOW MDM: CPT

## 2024-12-20 PROCEDURE — 81001 URINALYSIS AUTO W/SCOPE: CPT | Performed by: FAMILY MEDICINE

## 2024-12-20 PROCEDURE — 99283 EMERGENCY DEPT VISIT LOW MDM: CPT | Performed by: FAMILY MEDICINE

## 2024-12-20 PROCEDURE — 36415 COLL VENOUS BLD VENIPUNCTURE: CPT | Performed by: FAMILY MEDICINE

## 2024-12-20 PROCEDURE — 80307 DRUG TEST PRSMV CHEM ANLYZR: CPT | Performed by: FAMILY MEDICINE

## 2024-12-20 PROCEDURE — 80048 BASIC METABOLIC PNL TOTAL CA: CPT | Performed by: FAMILY MEDICINE

## 2024-12-20 PROCEDURE — 82310 ASSAY OF CALCIUM: CPT | Performed by: FAMILY MEDICINE

## 2024-12-20 PROCEDURE — 80143 DRUG ASSAY ACETAMINOPHEN: CPT | Performed by: FAMILY MEDICINE

## 2024-12-20 PROCEDURE — 85014 HEMATOCRIT: CPT | Performed by: FAMILY MEDICINE

## 2024-12-20 PROCEDURE — 250N000013 HC RX MED GY IP 250 OP 250 PS 637

## 2024-12-20 RX ORDER — QUETIAPINE FUMARATE 25 MG/1
25 TABLET, FILM COATED ORAL ONCE
Status: COMPLETED | OUTPATIENT
Start: 2024-12-20 | End: 2024-12-20

## 2024-12-20 RX ADMIN — QUETIAPINE FUMARATE 25 MG: 25 TABLET ORAL at 19:41

## 2024-12-20 ASSESSMENT — ACTIVITIES OF DAILY LIVING (ADL)
ADLS_ACUITY_SCORE: 48

## 2024-12-20 ASSESSMENT — COLUMBIA-SUICIDE SEVERITY RATING SCALE - C-SSRS
1. IN THE PAST MONTH, HAVE YOU WISHED YOU WERE DEAD OR WISHED YOU COULD GO TO SLEEP AND NOT WAKE UP?: YES
6. HAVE YOU EVER DONE ANYTHING, STARTED TO DO ANYTHING, OR PREPARED TO DO ANYTHING TO END YOUR LIFE?: YES
5. HAVE YOU STARTED TO WORK OUT OR WORKED OUT THE DETAILS OF HOW TO KILL YOURSELF? DO YOU INTEND TO CARRY OUT THIS PLAN?: NO
2. HAVE YOU ACTUALLY HAD ANY THOUGHTS OF KILLING YOURSELF IN THE PAST MONTH?: YES
4. HAVE YOU HAD THESE THOUGHTS AND HAD SOME INTENTION OF ACTING ON THEM?: NO
3. HAVE YOU BEEN THINKING ABOUT HOW YOU MIGHT KILL YOURSELF?: YES

## 2024-12-20 NOTE — ED TRIAGE NOTES
"Pt arrives via POV from home with complaints of high anxiety, can't stop crying, acute stressors over the past 2 days with relationship, reports non specific thoughts of self harm. States she was unable to go to work today because she was crying. Pt states she stopped some of her medications, states she was able to see her therapist 1 week ago and has a med consult in about a month.      Triage Assessment (Adult)       Row Name 12/20/24 1413          Triage Assessment    Airway WDL WDL        Respiratory WDL    Respiratory WDL WDL        Skin Circulation/Temperature WDL    Skin Circulation/Temperature WDL WDL        Cardiac WDL    Cardiac WDL WDL        Peripheral/Neurovascular WDL    Peripheral Neurovascular WDL WDL        Cognitive/Neuro/Behavioral WDL    Cognitive/Neuro/Behavioral WDL WDL        [place] Coma Scale    Best Eye Response 4-->(E4) spontaneous     Best Motor Response 6-->(M6) obeys commands     Best Verbal Response 5-->(V5) oriented     [place] Coma Scale Score 15                   BP (!) 153/85   Pulse 119   Temp 97.5  F (36.4  C) (Tympanic)   Resp 22   Ht 1.575 m (5' 2\")   Wt 64.5 kg (142 lb 3.2 oz)   LMP 12/17/2024   SpO2 99%   BMI 26.01 kg/m          "

## 2024-12-20 NOTE — ED NOTES
Patient says they aren't doing anything for her here. She said she wants to just go home and sit there instead of just sitting here. She says nothing helps her feel better. Will continue to monitor.    No pertinent past medical history

## 2024-12-20 NOTE — ED NOTES
Pt states she does not want to hurt herself, she just wants to stop being sad and is looking for help to do so. States she feels safe in the ER here.

## 2024-12-20 NOTE — Clinical Note
"[facility] (formerly [facility]) - safe place to provide support, assist with coping mechanisms. Highly recommend restarting Rexulti at bedtime as stopping this is likely contributing to your current symptoms.    DEPRESSION: PATIENT SELF CARE PLAN    Self-Care  for Depression    Keep taking your medication: Let your doctor know if you are experiencing any side effects. These frequently go away after a few days on medication. If you are feeling better, do not stop taking your medication without consulting y our doctor.     Medications are only part of the solution: You will have up and down days even on medication, you should use your \"Depression Survival Kit\" on down days to help keep down days from turning into longer periods of depression. Good days  are a great time to update your \"Depression Survival Kit.\" Practice good self care. If you are seeing a therapist or Behavioral Health consultant keep your appointments.    Suicidal thoughts occur occasionally for many depressed people: If these beco me upsetting, or frequent you should contact your doctor immediately. If you feel you might ever harm yourself or another person, you should go to the emergency room or call 911 immediately.   "
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
## 2024-12-21 NOTE — DISCHARGE INSTRUCTIONS
"[facility] - safe place to provide support, assist with coping mechanisms. Highly recommend restarting Rexulti at bedtime as stopping this is likely contributing to your current symptoms.    DEPRESSION: PATIENT SELF CARE PLAN    Self-Care for Depression    Keep taking your medication: Let your doctor know if you are experiencing any side effects. These frequently go away after a few days on medication. If you are feeling better, do not stop taking your medication without consulting your doctor.     Medications are only part of the solution: You will have up and down days even on medication, you should use your \"Depression Survival Kit\" on down days to help keep down days from turning into longer periods of depression. Good days are a great time to update your \"Depression Survival Kit.\" Practice good self care. If you are seeing a therapist or Behavioral Health consultant keep your appointments.    Suicidal thoughts occur occasionally for many depressed people: If these become upsetting, or frequent you should contact your doctor immediately. If you feel you might ever harm yourself or another person, you should go to the emergency room or call 911 immediately.  "

## 2024-12-21 NOTE — ED NOTES
Pt states she is going to dishcarge home with mother instead of going to [facility]. All providers, MD, NP and DEC okay with this plan.

## 2024-12-21 NOTE — ED PROVIDER NOTES
Transfer of care from previous shift provider:. Depression seen by behavioral health NP here in ED. [place] to likely be appropriate for [facility] who is reviewing case currently.       Assessment and Plan:  Final diagnoses:   Depression with anxiety   Generalized anxiety disorder      Ultimately patient desires discharge home with mother. There is no acute behavioral health emergent condition identified and therefore outpatient management seems appropriate. Disposition: home with care plan      Logan Garcia MD  12/21/24 0048

## 2024-12-21 NOTE — ED NOTES
Writer called [facility], they stated they have a female bed available. Requested NP note be faxed to [phone], has been faxed.  Awaiting call back from Akira at [facility] to see if they accept patient.

## 2025-06-14 ENCOUNTER — HEALTH MAINTENANCE LETTER (OUTPATIENT)
Age: 35
End: 2025-06-14

## 2025-08-12 ASSESSMENT — PATIENT HEALTH QUESTIONNAIRE - PHQ9
SUM OF ALL RESPONSES TO PHQ QUESTIONS 1-9: 12
10. IF YOU CHECKED OFF ANY PROBLEMS, HOW DIFFICULT HAVE THESE PROBLEMS MADE IT FOR YOU TO DO YOUR WORK, TAKE CARE OF THINGS AT HOME, OR GET ALONG WITH OTHER PEOPLE: SOMEWHAT DIFFICULT
SUM OF ALL RESPONSES TO PHQ QUESTIONS 1-9: 12

## 2025-08-13 ENCOUNTER — OFFICE VISIT (OUTPATIENT)
Dept: FAMILY MEDICINE | Facility: OTHER | Age: 35
End: 2025-08-13
Attending: FAMILY MEDICINE
Payer: COMMERCIAL

## 2025-08-13 ENCOUNTER — LAB (OUTPATIENT)
Dept: LAB | Facility: OTHER | Age: 35
End: 2025-08-13
Attending: FAMILY MEDICINE
Payer: COMMERCIAL

## 2025-08-13 VITALS
RESPIRATION RATE: 14 BRPM | TEMPERATURE: 97.6 F | DIASTOLIC BLOOD PRESSURE: 86 MMHG | WEIGHT: 140.6 LBS | SYSTOLIC BLOOD PRESSURE: 128 MMHG | BODY MASS INDEX: 25.72 KG/M2 | OXYGEN SATURATION: 100 % | HEART RATE: 95 BPM

## 2025-08-13 DIAGNOSIS — G50.0 TRIGEMINAL NEURALGIA: Primary | ICD-10-CM

## 2025-08-13 DIAGNOSIS — Z79.899 ENCOUNTER FOR LONG-TERM (CURRENT) USE OF MEDICATIONS: ICD-10-CM

## 2025-08-13 LAB
ALBUMIN SERPL BCG-MCNC: 4.6 G/DL (ref 3.5–5.2)
ALP SERPL-CCNC: 61 U/L (ref 40–150)
ALT SERPL W P-5'-P-CCNC: 12 U/L (ref 0–50)
ANION GAP SERPL CALCULATED.3IONS-SCNC: 11 MMOL/L (ref 7–15)
AST SERPL W P-5'-P-CCNC: 14 U/L (ref 0–45)
BASOPHILS # BLD AUTO: 0.03 10E3/UL (ref 0–0.2)
BASOPHILS NFR BLD AUTO: 0.4 %
BILIRUB SERPL-MCNC: 0.5 MG/DL
BUN SERPL-MCNC: 14.4 MG/DL (ref 6–20)
CALCIUM SERPL-MCNC: 9.2 MG/DL (ref 8.8–10.4)
CHLORIDE SERPL-SCNC: 103 MMOL/L (ref 98–107)
CHOLEST SERPL-MCNC: 160 MG/DL
CREAT SERPL-MCNC: 0.6 MG/DL (ref 0.51–0.95)
EGFRCR SERPLBLD CKD-EPI 2021: >90 ML/MIN/1.73M2
EOSINOPHIL # BLD AUTO: 0.01 10E3/UL (ref 0–0.7)
EOSINOPHIL NFR BLD AUTO: 0.1 %
ERYTHROCYTE [DISTWIDTH] IN BLOOD BY AUTOMATED COUNT: 11.6 % (ref 10–15)
EST. AVERAGE GLUCOSE BLD GHB EST-MCNC: 82 MG/DL
FASTING STATUS PATIENT QL REPORTED: NORMAL
GLUCOSE SERPL-MCNC: 107 MG/DL (ref 70–99)
HBA1C MFR BLD: 4.5 %
HCO3 SERPL-SCNC: 23 MMOL/L (ref 22–29)
HCT VFR BLD AUTO: 39.4 % (ref 35–47)
HDLC SERPL-MCNC: 75 MG/DL
HGB BLD-MCNC: 13.4 G/DL (ref 11.7–15.7)
IMM GRANULOCYTES # BLD: 0.03 10E3/UL
IMM GRANULOCYTES NFR BLD: 0.4 %
IRON SERPL-MCNC: 134 UG/DL (ref 37–145)
LDLC SERPL CALC-MCNC: 76 MG/DL
LYMPHOCYTES # BLD AUTO: 1.41 10E3/UL (ref 0.8–5.3)
LYMPHOCYTES NFR BLD AUTO: 17.2 %
MAGNESIUM SERPL-MCNC: 2.1 MG/DL (ref 1.7–2.3)
MCH RBC QN AUTO: 30.5 PG (ref 26.5–33)
MCHC RBC AUTO-ENTMCNC: 34 G/DL (ref 31.5–36.5)
MCV RBC AUTO: 89.5 FL (ref 78–100)
MONOCYTES # BLD AUTO: 0.28 10E3/UL (ref 0–1.3)
MONOCYTES NFR BLD AUTO: 3.4 %
NEUTROPHILS # BLD AUTO: 6.43 10E3/UL (ref 1.6–8.3)
NEUTROPHILS NFR BLD AUTO: 78.5 %
NONHDLC SERPL-MCNC: 85 MG/DL
NRBC # BLD AUTO: 0 10E3/UL
NRBC BLD AUTO-RTO: 0 /100
PLATELET # BLD AUTO: 300 10E3/UL (ref 150–450)
POTASSIUM SERPL-SCNC: 4.5 MMOL/L (ref 3.4–5.3)
PROLACTIN SERPL 3RD IS-MCNC: 14 NG/ML (ref 5–23)
PROT SERPL-MCNC: 7.6 G/DL (ref 6.4–8.3)
PTH-INTACT SERPL-MCNC: 31 PG/ML (ref 15–65)
RBC # BLD AUTO: 4.4 10E6/UL (ref 3.8–5.2)
SODIUM SERPL-SCNC: 137 MMOL/L (ref 135–145)
T3FREE SERPL-MCNC: 2.5 PG/ML (ref 2–4.4)
T4 FREE SERPL-MCNC: 1.29 NG/DL (ref 0.9–1.7)
TRIGL SERPL-MCNC: 47 MG/DL
TSH SERPL DL<=0.005 MIU/L-ACNC: 0.71 UIU/ML (ref 0.3–4.2)
VIT B12 SERPL-MCNC: 515 PG/ML (ref 232–1245)
VIT D+METAB SERPL-MCNC: 33 NG/ML (ref 20–50)
WBC # BLD AUTO: 8.19 10E3/UL (ref 4–11)

## 2025-08-13 PROCEDURE — 82607 VITAMIN B-12: CPT | Mod: ZL

## 2025-08-13 PROCEDURE — 36415 COLL VENOUS BLD VENIPUNCTURE: CPT | Mod: ZL

## 2025-08-13 PROCEDURE — 84481 FREE ASSAY (FT-3): CPT | Mod: ZL

## 2025-08-13 PROCEDURE — 82306 VITAMIN D 25 HYDROXY: CPT | Mod: ZL

## 2025-08-13 PROCEDURE — G0463 HOSPITAL OUTPT CLINIC VISIT: HCPCS

## 2025-08-13 PROCEDURE — 84146 ASSAY OF PROLACTIN: CPT | Mod: ZL

## 2025-08-13 PROCEDURE — 83540 ASSAY OF IRON: CPT | Mod: ZL

## 2025-08-13 PROCEDURE — 83735 ASSAY OF MAGNESIUM: CPT | Mod: ZL

## 2025-08-13 PROCEDURE — 80053 COMPREHEN METABOLIC PANEL: CPT | Mod: ZL

## 2025-08-13 PROCEDURE — 83036 HEMOGLOBIN GLYCOSYLATED A1C: CPT | Mod: ZL

## 2025-08-13 PROCEDURE — 85025 COMPLETE CBC W/AUTO DIFF WBC: CPT | Mod: ZL

## 2025-08-13 PROCEDURE — 83970 ASSAY OF PARATHORMONE: CPT | Mod: ZL

## 2025-08-13 PROCEDURE — 82465 ASSAY BLD/SERUM CHOLESTEROL: CPT | Mod: ZL

## 2025-08-13 PROCEDURE — 84443 ASSAY THYROID STIM HORMONE: CPT | Mod: ZL

## 2025-08-13 PROCEDURE — 84439 ASSAY OF FREE THYROXINE: CPT | Mod: ZL

## 2025-08-13 RX ORDER — CLONAZEPAM 1 MG/1
1 TABLET ORAL 3 TIMES DAILY PRN
COMMUNITY

## 2025-08-13 RX ORDER — DEXTROAMPHETAMINE SACCHARATE, AMPHETAMINE ASPARTATE, DEXTROAMPHETAMINE SULFATE AND AMPHETAMINE SULFATE 5; 5; 5; 5 MG/1; MG/1; MG/1; MG/1
20 TABLET ORAL 2 TIMES DAILY
COMMUNITY

## 2025-08-13 RX ORDER — PREDNISONE 20 MG/1
20 TABLET ORAL 2 TIMES DAILY
Qty: 6 TABLET | Refills: 0 | Status: SHIPPED | OUTPATIENT
Start: 2025-08-13 | End: 2025-08-16

## 2025-08-13 RX ORDER — LAMOTRIGINE 100 MG/1
100 TABLET ORAL DAILY
COMMUNITY

## 2025-08-13 RX ORDER — ACYCLOVIR 400 MG/1
400 TABLET ORAL
Qty: 35 TABLET | Refills: 0 | Status: SHIPPED | OUTPATIENT
Start: 2025-08-13 | End: 2025-08-20

## 2025-08-13 RX ORDER — AZITHROMYCIN 250 MG/1
500 TABLET, FILM COATED ORAL DAILY
COMMUNITY
Start: 2025-08-11

## 2025-08-13 RX ORDER — METHYLPREDNISOLONE 4 MG/1
4 TABLET ORAL
COMMUNITY
Start: 2025-08-11

## 2025-08-13 ASSESSMENT — ENCOUNTER SYMPTOMS
ARTHRALGIAS: 0
ABDOMINAL PAIN: 0
CHILLS: 0
FEVER: 0
HEMATURIA: 0
BACK PAIN: 0
PALPITATIONS: 0
SORE THROAT: 0
EYE PAIN: 0
HEADACHES: 1
SHORTNESS OF BREATH: 0
DYSURIA: 0
SEIZURES: 0
VOMITING: 0
COUGH: 0
COLOR CHANGE: 0

## 2025-08-13 ASSESSMENT — PAIN SCALES - GENERAL: PAINLEVEL_OUTOF10: MILD PAIN (3)

## (undated) RX ORDER — OLANZAPINE 10 MG/1
TABLET ORAL
Status: DISPENSED
Start: 2023-10-25

## (undated) RX ORDER — GINSENG 100 MG
CAPSULE ORAL
Status: DISPENSED
Start: 2022-07-05

## (undated) RX ORDER — NICOTINE 21 MG/24HR
PATCH, TRANSDERMAL 24 HOURS TRANSDERMAL
Status: DISPENSED
Start: 2023-10-25

## (undated) RX ORDER — ACETAMINOPHEN 325 MG/1
TABLET ORAL
Status: DISPENSED
Start: 2022-07-05

## (undated) RX ORDER — ONDANSETRON 2 MG/ML
INJECTION INTRAMUSCULAR; INTRAVENOUS
Status: DISPENSED
Start: 2022-07-05

## (undated) RX ORDER — ONDANSETRON 4 MG/1
TABLET, ORALLY DISINTEGRATING ORAL
Status: DISPENSED
Start: 2023-10-26

## (undated) RX ORDER — METOCLOPRAMIDE 10 MG/1
TABLET ORAL
Status: DISPENSED
Start: 2023-10-25

## (undated) RX ORDER — IBUPROFEN 200 MG
TABLET ORAL
Status: DISPENSED
Start: 2023-10-25

## (undated) RX ORDER — HYDROMORPHONE HYDROCHLORIDE 1 MG/ML
INJECTION, SOLUTION INTRAMUSCULAR; INTRAVENOUS; SUBCUTANEOUS
Status: DISPENSED
Start: 2022-07-05

## (undated) RX ORDER — IBUPROFEN 400 MG/1
TABLET, FILM COATED ORAL
Status: DISPENSED
Start: 2023-10-25

## (undated) RX ORDER — IBUPROFEN 200 MG
TABLET ORAL
Status: DISPENSED
Start: 2023-10-26

## (undated) RX ORDER — GABAPENTIN 300 MG/1
CAPSULE ORAL
Status: DISPENSED
Start: 2023-10-25

## (undated) RX ORDER — DIPHENHYDRAMINE HCL 25 MG
CAPSULE ORAL
Status: DISPENSED
Start: 2023-10-25

## (undated) RX ORDER — KETOROLAC TROMETHAMINE 30 MG/ML
INJECTION, SOLUTION INTRAMUSCULAR; INTRAVENOUS
Status: DISPENSED
Start: 2022-07-05

## (undated) RX ORDER — NICOTINE 21 MG/24HR
PATCH, TRANSDERMAL 24 HOURS TRANSDERMAL
Status: DISPENSED
Start: 2023-10-26

## (undated) RX ORDER — KETOROLAC TROMETHAMINE 30 MG/ML
INJECTION, SOLUTION INTRAMUSCULAR; INTRAVENOUS
Status: DISPENSED
Start: 2023-10-26